# Patient Record
Sex: FEMALE | Race: WHITE | Employment: FULL TIME | URBAN - NONMETROPOLITAN AREA
[De-identification: names, ages, dates, MRNs, and addresses within clinical notes are randomized per-mention and may not be internally consistent; named-entity substitution may affect disease eponyms.]

---

## 2020-11-11 ENCOUNTER — APPOINTMENT (OUTPATIENT)
Dept: GENERAL RADIOLOGY | Age: 30
End: 2020-11-11
Payer: MEDICAID

## 2020-11-11 ENCOUNTER — HOSPITAL ENCOUNTER (EMERGENCY)
Age: 30
Discharge: HOME | End: 2020-11-11
Payer: MEDICAID

## 2020-11-11 VITALS
SYSTOLIC BLOOD PRESSURE: 122 MMHG | HEART RATE: 62 BPM | RESPIRATION RATE: 20 BRPM | TEMPERATURE: 97 F | OXYGEN SATURATION: 99 % | DIASTOLIC BLOOD PRESSURE: 85 MMHG

## 2020-11-11 LAB
-: ABNORMAL
ALBUMIN SERPL-MCNC: 4.4 G/DL (ref 3.5–5.2)
ALBUMIN/GLOBULIN RATIO: 1.4 (ref 1–2.5)
ALP BLD-CCNC: 122 U/L (ref 35–104)
ALT SERPL-CCNC: 19 U/L (ref 5–33)
AMORPHOUS: ABNORMAL
ANION GAP SERPL CALCULATED.3IONS-SCNC: 13 MMOL/L (ref 9–17)
AST SERPL-CCNC: 19 U/L
BACTERIA: ABNORMAL
BILIRUB SERPL-MCNC: 0.27 MG/DL (ref 0.3–1.2)
BILIRUBIN URINE: NEGATIVE
BUN BLDV-MCNC: 11 MG/DL (ref 6–20)
BUN/CREAT BLD: 14 (ref 9–20)
CALCIUM SERPL-MCNC: 9.4 MG/DL (ref 8.6–10.4)
CASTS UA: ABNORMAL /LPF
CHLORIDE BLD-SCNC: 105 MMOL/L (ref 98–107)
CO2: 24 MMOL/L (ref 20–31)
COLOR: YELLOW
COMMENT UA: ABNORMAL
CREAT SERPL-MCNC: 0.81 MG/DL (ref 0.5–0.9)
CRYSTALS, UA: ABNORMAL /HPF
D-DIMER QUANTITATIVE: <0.27 MG/L FEU (ref 0–0.59)
EPITHELIAL CELLS UA: ABNORMAL /HPF (ref 0–25)
GFR AFRICAN AMERICAN: >60 ML/MIN
GFR NON-AFRICAN AMERICAN: >60 ML/MIN
GFR SERPL CREATININE-BSD FRML MDRD: ABNORMAL ML/MIN/{1.73_M2}
GFR SERPL CREATININE-BSD FRML MDRD: ABNORMAL ML/MIN/{1.73_M2}
GLUCOSE BLD-MCNC: 89 MG/DL (ref 70–99)
GLUCOSE URINE: NEGATIVE
HCG(URINE) PREGNANCY TEST: NEGATIVE
HCT VFR BLD CALC: 38.3 % (ref 36.3–47.1)
HEMOGLOBIN: 12.9 G/DL (ref 11.9–15.1)
KETONES, URINE: NEGATIVE
LEUKOCYTE ESTERASE, URINE: NEGATIVE
LIPASE: 19 U/L (ref 13–60)
MCH RBC QN AUTO: 30.5 PG (ref 25.2–33.5)
MCHC RBC AUTO-ENTMCNC: 33.7 G/DL (ref 28.4–34.8)
MCV RBC AUTO: 90.5 FL (ref 82.6–102.9)
MUCUS: ABNORMAL
NITRITE, URINE: NEGATIVE
NRBC AUTOMATED: 0 PER 100 WBC
OTHER OBSERVATIONS UA: ABNORMAL
PDW BLD-RTO: 11.7 % (ref 11.8–14.4)
PH UA: 6 (ref 5–9)
PLATELET # BLD: 270 K/UL (ref 138–453)
PMV BLD AUTO: 9.4 FL (ref 8.1–13.5)
POTASSIUM SERPL-SCNC: 4 MMOL/L (ref 3.7–5.3)
PROTEIN UA: NEGATIVE
RBC # BLD: 4.23 M/UL (ref 3.95–5.11)
RBC UA: ABNORMAL /HPF (ref 0–2)
RENAL EPITHELIAL, UA: ABNORMAL /HPF
SARS-COV-2, RAPID: NOT DETECTED
SARS-COV-2: NORMAL
SARS-COV-2: NORMAL
SODIUM BLD-SCNC: 142 MMOL/L (ref 135–144)
SOURCE: NORMAL
SPECIFIC GRAVITY UA: 1.02 (ref 1.01–1.02)
TOTAL PROTEIN: 7.6 G/DL (ref 6.4–8.3)
TRICHOMONAS: ABNORMAL
TROPONIN INTERP: NORMAL
TROPONIN T: NORMAL NG/ML
TROPONIN, HIGH SENSITIVITY: <6 NG/L (ref 0–14)
TURBIDITY: CLEAR
URINE HGB: ABNORMAL
UROBILINOGEN, URINE: NORMAL
WBC # BLD: 8.5 K/UL (ref 3.5–11.3)
WBC UA: ABNORMAL /HPF (ref 0–5)
YEAST: ABNORMAL

## 2020-11-11 PROCEDURE — 6370000000 HC RX 637 (ALT 250 FOR IP): Performed by: PHYSICIAN ASSISTANT

## 2020-11-11 PROCEDURE — 96361 HYDRATE IV INFUSION ADD-ON: CPT

## 2020-11-11 PROCEDURE — 93005 ELECTROCARDIOGRAM TRACING: CPT | Performed by: PHYSICIAN ASSISTANT

## 2020-11-11 PROCEDURE — C9803 HOPD COVID-19 SPEC COLLECT: HCPCS

## 2020-11-11 PROCEDURE — U0002 COVID-19 LAB TEST NON-CDC: HCPCS

## 2020-11-11 PROCEDURE — 36415 COLL VENOUS BLD VENIPUNCTURE: CPT

## 2020-11-11 PROCEDURE — 71045 X-RAY EXAM CHEST 1 VIEW: CPT

## 2020-11-11 PROCEDURE — 81025 URINE PREGNANCY TEST: CPT

## 2020-11-11 PROCEDURE — 84484 ASSAY OF TROPONIN QUANT: CPT

## 2020-11-11 PROCEDURE — 6360000002 HC RX W HCPCS: Performed by: PHYSICIAN ASSISTANT

## 2020-11-11 PROCEDURE — 96374 THER/PROPH/DIAG INJ IV PUSH: CPT

## 2020-11-11 PROCEDURE — 83690 ASSAY OF LIPASE: CPT

## 2020-11-11 PROCEDURE — 85027 COMPLETE CBC AUTOMATED: CPT

## 2020-11-11 PROCEDURE — 99284 EMERGENCY DEPT VISIT MOD MDM: CPT

## 2020-11-11 PROCEDURE — 85379 FIBRIN DEGRADATION QUANT: CPT

## 2020-11-11 PROCEDURE — 2580000003 HC RX 258: Performed by: PHYSICIAN ASSISTANT

## 2020-11-11 PROCEDURE — 81001 URINALYSIS AUTO W/SCOPE: CPT

## 2020-11-11 PROCEDURE — 80053 COMPREHEN METABOLIC PANEL: CPT

## 2020-11-11 RX ORDER — ALBUTEROL SULFATE 90 UG/1
1-2 AEROSOL, METERED RESPIRATORY (INHALATION) EVERY 4 HOURS PRN
Qty: 1 INHALER | Refills: 0 | Status: SHIPPED | OUTPATIENT
Start: 2020-11-11

## 2020-11-11 RX ORDER — ONDANSETRON 2 MG/ML
4 INJECTION INTRAMUSCULAR; INTRAVENOUS ONCE
Status: COMPLETED | OUTPATIENT
Start: 2020-11-11 | End: 2020-11-11

## 2020-11-11 RX ORDER — 0.9 % SODIUM CHLORIDE 0.9 %
1000 INTRAVENOUS SOLUTION INTRAVENOUS ONCE
Status: COMPLETED | OUTPATIENT
Start: 2020-11-11 | End: 2020-11-11

## 2020-11-11 RX ORDER — 0.9 % SODIUM CHLORIDE 0.9 %
1000 INTRAVENOUS SOLUTION INTRAVENOUS ONCE
Status: DISCONTINUED | OUTPATIENT
Start: 2020-11-11 | End: 2020-11-11

## 2020-11-11 RX ORDER — FLUOXETINE HYDROCHLORIDE 20 MG/1
60 CAPSULE ORAL DAILY
COMMUNITY
End: 2021-03-13 | Stop reason: ALTCHOICE

## 2020-11-11 RX ORDER — GUAIFENESIN 100 MG/5ML
100 SOLUTION ORAL ONCE
Status: COMPLETED | OUTPATIENT
Start: 2020-11-11 | End: 2020-11-11

## 2020-11-11 RX ORDER — ACETAMINOPHEN 500 MG
1000 TABLET ORAL ONCE
Status: COMPLETED | OUTPATIENT
Start: 2020-11-11 | End: 2020-11-11

## 2020-11-11 RX ORDER — ONDANSETRON 4 MG/1
4 TABLET, ORALLY DISINTEGRATING ORAL EVERY 8 HOURS PRN
Qty: 20 TABLET | Refills: 0 | Status: SHIPPED | OUTPATIENT
Start: 2020-11-11 | End: 2021-03-13 | Stop reason: ALTCHOICE

## 2020-11-11 RX ORDER — GUAIFENESIN 600 MG/1
1200 TABLET, EXTENDED RELEASE ORAL 2 TIMES DAILY
COMMUNITY
End: 2021-03-13 | Stop reason: ALTCHOICE

## 2020-11-11 RX ADMIN — SODIUM CHLORIDE 1000 ML: 9 INJECTION, SOLUTION INTRAVENOUS at 19:25

## 2020-11-11 RX ADMIN — ONDANSETRON 4 MG: 2 INJECTION INTRAMUSCULAR; INTRAVENOUS at 19:24

## 2020-11-11 RX ADMIN — GUAIFENESIN 100 MG: 300 SOLUTION ORAL at 21:24

## 2020-11-11 RX ADMIN — ACETAMINOPHEN 1000 MG: 500 TABLET, FILM COATED ORAL at 21:23

## 2020-11-11 ASSESSMENT — PAIN SCALES - GENERAL
PAINLEVEL_OUTOF10: 5
PAINLEVEL_OUTOF10: 7
PAINLEVEL_OUTOF10: 8

## 2020-11-11 ASSESSMENT — ENCOUNTER SYMPTOMS
BLOOD IN STOOL: 0
BACK PAIN: 0
SHORTNESS OF BREATH: 0
WHEEZING: 0
RHINORRHEA: 0
CHEST TIGHTNESS: 0
COUGH: 0
ABDOMINAL PAIN: 0
SORE THROAT: 0
CONSTIPATION: 0
EYE REDNESS: 0
EYE DISCHARGE: 0
VOMITING: 1
DIARRHEA: 1
NAUSEA: 1

## 2020-11-11 ASSESSMENT — PAIN DESCRIPTION - PAIN TYPE
TYPE: ACUTE PAIN
TYPE: ACUTE PAIN

## 2020-11-11 ASSESSMENT — PAIN DESCRIPTION - LOCATION
LOCATION: GENERALIZED
LOCATION: THROAT

## 2020-11-11 ASSESSMENT — PAIN - FUNCTIONAL ASSESSMENT: PAIN_FUNCTIONAL_ASSESSMENT: 0-10

## 2020-11-12 LAB
EKG ATRIAL RATE: 63 BPM
EKG P AXIS: 56 DEGREES
EKG P-R INTERVAL: 172 MS
EKG Q-T INTERVAL: 418 MS
EKG QRS DURATION: 90 MS
EKG QTC CALCULATION (BAZETT): 427 MS
EKG R AXIS: 46 DEGREES
EKG T AXIS: 50 DEGREES
EKG VENTRICULAR RATE: 63 BPM

## 2020-11-12 PROCEDURE — 93010 ELECTROCARDIOGRAM REPORT: CPT | Performed by: INTERNAL MEDICINE

## 2020-11-12 NOTE — ED PROVIDER NOTES
[facility] ED  eMERGENCY dEPARTMENT eNCOUnter      Pt Name: Buck Hauser  MRN: [id number]  [place] 1990  Date of evaluation: 11/11/2020  Provider: Marissa Cruz Dr     Chief Complaint   Patient presents with    Fatigue     onset this last weekend, getting worse today    Emesis     \"cannot keep anything down\" x2 days    Diarrhea     x2 days    Headache     onset sunday         HISTORY OF PRESENT ILLNESS   (Location/Symptom, Timing/Onset, Context/Setting,Quality, Duration, Modifying Factors, Severity)  Note limiting factors. Buck Hauser is a29 y.o. female who presents to the emergency department with complaints of generalized body aches feeling like her entire body is on pain for the last 2 days. Associated complaints clued nausea vomiting diarrhea. Patient persistently cannot keep a lot of stuff down. She denies abdominal pain. She denies any neck pain or back pain. She denies any chest pain shortness of breath or dizziness. She otherwise has no other complaints at this time. HPI    Nursing Notes werereviewed. REVIEW OF SYSTEMS    (2-9 systems for level 4, 10 or more for level 5)     Review of Systems   Constitutional: Negative for chills, diaphoresis and fever. HENT: Negative for congestion, ear pain, rhinorrhea and sore throat. Eyes: Negative for discharge, redness and visual disturbance. Respiratory: Negative for cough, chest tightness, shortness of breath and wheezing. Cardiovascular: Negative for chest pain and palpitations. Gastrointestinal: Positive for diarrhea, nausea and vomiting. Negative for abdominal pain, blood in stool and constipation. Endocrine: Negative for polydipsia, polyphagia and polyuria. Genitourinary: Negative for decreased urine volume, difficulty urinating, dysuria, frequency and hematuria. Musculoskeletal: Positive for arthralgias. Negative for back pain and myalgias. Skin: Negative for pallor and rash. Allergic/Immunologic: Negative for food allergies and immunocompromised state. Neurological: Negative for dizziness, syncope, weakness and light-headedness. Hematological: Negative for adenopathy. Does not bruise/bleed easily. Psychiatric/Behavioral: Negative for behavioral problems and suicidal ideas. The patient is not nervous/anxious. Except as noted above the remainder of the review of systems was reviewed and negative. PAST MEDICAL HISTORY     Past Medical History:   Diagnosis Date    Anxiety     Depression     Drug abuse ([facility] [address]          SURGICALHISTORY       Past Surgical History:   Procedure Laterality Date    APPENDECTOMY      HYSTERECTOMY           CURRENT MEDICATIONS       Discharge Medication List as of 11/11/2020 10:02 PM      CONTINUE these medications which have NOT CHANGED    Details   FLUoxetine (PROZAC) 20 MG capsule Take 60 mg by mouth dailyHistorical Med      guaiFENesin (MUCINEX) 600 MG extended release tablet Take 1,200 mg by mouth 2 times dailyHistorical Med             ALLERGIES     Carafate [sucralfate]; Keflex [cephalexin]; Morphine; and Mushroom extract complex    FAMILY HISTORY     History reviewed. No pertinent family history.        SOCIAL HISTORY       Social History     Socioeconomic History    Marital status: Single     Spouse name: None    Number of children: None    Years of education: None    Highest education level: None   Occupational History    None   Social Needs    Financial resource strain: None    Food insecurity     Worry: None     Inability: None    Transportation needs     Medical: None     Non-medical: None   Tobacco Use    Smoking status: Former Smoker    Smokeless tobacco: Never Used   Substance and Sexual Activity    Alcohol use: Not Currently    Drug use: Not Currently    Sexual activity: Yes   Lifestyle    Physical activity     Days per week: None     Minutes per session: None    Stress: None   Relationships    Social connections     Talks on phone: None     Gets together: None     Attends Anabaptist service: None     Active member of club or organization: None     Attends meetings of clubs or organizations: None     Relationship status: None    Intimate partner violence     Fear of current or ex partner: None     Emotionally abused: None     Physically abused: None     Forced sexual activity: None   Other Topics Concern    None   Social History Narrative    None       SCREENINGS      @FLOW([id number])@      PHYSICAL EXAM    (up to 7 for level 4, 8 or more for level 5)     ED Triage Vitals   BP Temp Temp src Pulse Resp SpO2 Height Weight   11/11/20 1806 11/11/20 1816 -- 11/11/20 1806 11/11/20 1806 11/11/20 1806 -- --   133/87 97 °F (36.1 °C)  68 16 99 %         Physical Exam  Vitals signs and nursing note reviewed. Constitutional:       General: She is not in acute distress. Appearance: Normal appearance. She is well-developed. She is not ill-appearing, toxic-appearing or diaphoretic. HENT:      Head: Normocephalic and atraumatic. Right Ear: External ear normal.      Left Ear: External ear normal.      Mouth/Throat:      Pharynx: No oropharyngeal exudate. Eyes:      General: No scleral icterus. Right eye: No discharge. Left eye: No discharge. Conjunctiva/sclera: Conjunctivae normal.      Pupils: Pupils are equal, round, and reactive to light. Neck:      Musculoskeletal: Normal range of motion and neck supple. Thyroid: No thyromegaly. Trachea: No tracheal deviation. Cardiovascular:      Rate and Rhythm: Normal rate and regular rhythm. Heart sounds: No murmur. No friction rub. No gallop. Pulmonary:      Effort: Pulmonary effort is normal. No respiratory distress. Breath sounds: Normal breath sounds. No stridor. No wheezing or rhonchi. Abdominal:      General: Bowel sounds are normal. There is no distension. Palpations: Abdomen is soft. Tenderness:  There is no abdominal tenderness. There is no right CVA tenderness, left CVA tenderness, guarding or rebound. Hernia: No hernia is present. Musculoskeletal: Normal range of motion. General: No tenderness or deformity. Lymphadenopathy:      Cervical: No cervical adenopathy. Skin:     General: Skin is warm and dry. Capillary Refill: Capillary refill takes less than 2 seconds. Findings: No erythema or rash. Neurological:      General: No focal deficit present. Mental Status: She is alert and oriented to person, place, and time. Cranial Nerves: No cranial nerve deficit. Motor: No abnormal muscle tone. Deep Tendon Reflexes: Reflexes are normal and symmetric.  Reflexes normal.   Psychiatric:         Mood and Affect: Mood normal.         Behavior: Behavior normal.         DIAGNOSTIC RESULTS     EKG: All EKG's are interpreted by the Emergency Department Physician who either signs orCo-signs this chart in the absence of a cardiologist.      RADIOLOGY:   Non-plainfilm images such as CT, Ultrasound and MRI are read by the radiologist. Plain radiographic images are visualized and preliminarily interpreted by the emergency physician with the below findings:      Interpretationper the Radiologist below, if available at the time of this note:    XR CHEST PORTABLE   Final Result   No acute cardiopulmonary process               ED BEDSIDE ULTRASOUND:   Performed by ED Physician - none    LABS:  Labs Reviewed   CBC - Abnormal; Notable for the following components:       Result Value    RDW 11.7 (*)     All other components within normal limits   COMPREHENSIVE METABOLIC PANEL - Abnormal; Notable for the following components:    Alkaline Phosphatase 122 (*)     Total Bilirubin 0.27 (*)     All other components within normal limits   URINE RT REFLEX TO CULTURE - Abnormal; Notable for the following components:    Specific Gravity, UA 1.025 (*)     Urine Hgb TRACE (*)     All other components within normal limits   MICROSCOPIC URINALYSIS - Abnormal; Notable for the following components:    Bacteria, UA TRACE (*)     Mucus, UA 1+ (*)     All other components within normal limits   LIPASE   PREGNANCY, URINE   TROPONIN   D-DIMER, QUANTITATIVE   COVID-19       All other labs were within normal range or not returned as of this dictation. EMERGENCY DEPARTMENT COURSE and DIFFERENTIAL DIAGNOSIS/MDM:   Vitals:    Vitals:    11/11/20 2145 11/11/20 2200 11/11/20 2215 11/11/20 2225   BP: (!) 115/91 128/88 122/85    Pulse: 56 62  62   Resp: 15 20     Temp:       SpO2: 98% 98% 99%          MDM  80-year-old female who presents with generalized body aches nausea vomiting diarrhea. On exam she is not meningismus nonencephalopathic has no abdominal tenderness. She has been exposed to others in the community. I am concerned she could possibly have COVID-19. Were going to get work-up to rule out acute infection dehydration likely imbalance I think this is less likely colitis obstruction mass appendicitis cholecystitis. Patient while in her room started to complain of shortness of breath. Will get work-up. Rule out PE, MI, ACS, pneumonia    At this point, the evidence for any other entities in the differential is insufficient to justify any further testing. This was extended the patient. The patient was advised that persistent or worsening symptoms would require further evaluation. My attending physician will follow up remaining work-up. Procedures    FINAL IMPRESSION      1.  Viral syndrome        DISPOSITION/PLAN   DISPOSITION Decision To Discharge 11/11/2020 09:54:46 PM      PATIENT REFERRED TO:  [facility] ED  [address] [phone]    If symptoms worsen, As needed      DISCHARGE MEDICATIONS:  Discharge Medication List as of 11/11/2020 10:02 PM      START taking these medications    Details   ondansetron (ZOFRAN ODT) 4 MG disintegrating tablet Take 1 tablet by mouth every 8 hours as needed for Nausea, Disp-20 tablet,R-0Print      albuterol sulfate HFA (PROVENTIL HFA) 108 (90 Base) MCG/ACT inhaler Inhale 1-2 puffs into the lungs every 4 hours as needed for Wheezing or Shortness of Breath (Space out to every 6 hours as symptoms improve) Space out to every 6 hours as symptoms improve., Disp-1 Inhaler,R-0Print      bismuth subsalicylate (BISMATROL) 262 MG/15ML suspension Take 15 mLs by mouth every 6 hours as needed for Diarrhea, Disp-118 mL,R-0Print                    Summation      Patient Course:      ED Medications administered this visit:    Medications   ondansetron (ZOFRAN) injection 4 mg (4 mg Intravenous Given 11/11/20 1924)   0.9 % sodium chloride bolus (0 mLs Intravenous Stopped 11/11/20 2030)   acetaminophen (TYLENOL) tablet 1,000 mg (1,000 mg Oral Given 11/11/20 2123)   guaiFENesin (ROBITUSSIN) 100 MG/5ML oral solution 100 mg (100 mg Oral Given 11/11/20 2124)       New Prescriptions from this visit:    Discharge Medication List as of 11/11/2020 10:02 PM      START taking these medications    Details   ondansetron (ZOFRAN ODT) 4 MG disintegrating tablet Take 1 tablet by mouth every 8 hours as needed for Nausea, Disp-20 tablet,R-0Print      albuterol sulfate HFA (PROVENTIL HFA) 108 (90 Base) MCG/ACT inhaler Inhale 1-2 puffs into the lungs every 4 hours as needed for Wheezing or Shortness of Breath (Space out to every 6 hours as symptoms improve) Space out to every 6 hours as symptoms improve., Disp-1 Inhaler,R-0Print      bismuth subsalicylate (BISMATROL) 262 MG/15ML suspension Take 15 mLs by mouth every 6 hours as needed for Diarrhea, Disp-118 mL,R-0Print             Follow-up:  [facility] ED  [address]  [phone]    If symptoms worsen, As needed        Final Impression:   1.  Viral syndrome               (Please note that portions of this note were completed with a voice recognition program.  Efforts were made to edit the dictations but occasionally words are mis-transcribed.)           Cathy Neely PA-C  11/12/20 2103

## 2021-03-13 ENCOUNTER — HOSPITAL ENCOUNTER (EMERGENCY)
Age: 31
Discharge: HOME | End: 2021-03-13
Attending: EMERGENCY MEDICINE
Payer: MEDICAID

## 2021-03-13 ENCOUNTER — APPOINTMENT (OUTPATIENT)
Dept: GENERAL RADIOLOGY | Age: 31
End: 2021-03-13
Payer: MEDICAID

## 2021-03-13 VITALS
HEART RATE: 74 BPM | TEMPERATURE: 97.5 F | WEIGHT: 150 LBS | HEIGHT: 66 IN | RESPIRATION RATE: 15 BRPM | SYSTOLIC BLOOD PRESSURE: 106 MMHG | DIASTOLIC BLOOD PRESSURE: 70 MMHG | OXYGEN SATURATION: 99 % | BODY MASS INDEX: 24.11 KG/M2

## 2021-03-13 DIAGNOSIS — G43.009 MIGRAINE WITHOUT AURA AND WITHOUT STATUS MIGRAINOSUS, NOT INTRACTABLE: Primary | ICD-10-CM

## 2021-03-13 DIAGNOSIS — B34.9 VIRAL SYNDROME: ICD-10-CM

## 2021-03-13 LAB
ABSOLUTE EOS #: 0.06 K/UL (ref 0–0.44)
ABSOLUTE IMMATURE GRANULOCYTE: <0.03 K/UL (ref 0–0.3)
ABSOLUTE LYMPH #: 0.89 K/UL (ref 1.1–3.7)
ABSOLUTE MONO #: 0.96 K/UL (ref 0.1–1.2)
ANION GAP SERPL CALCULATED.3IONS-SCNC: 11 MMOL/L (ref 9–17)
BASOPHILS # BLD: 0 % (ref 0–2)
BASOPHILS ABSOLUTE: <0.03 K/UL (ref 0–0.2)
BUN BLDV-MCNC: 16 MG/DL (ref 6–20)
BUN/CREAT BLD: 20 (ref 9–20)
CALCIUM SERPL-MCNC: 9.7 MG/DL (ref 8.6–10.4)
CHLORIDE BLD-SCNC: 97 MMOL/L (ref 98–107)
CO2: 26 MMOL/L (ref 20–31)
CREAT SERPL-MCNC: 0.79 MG/DL (ref 0.5–0.9)
DIFFERENTIAL TYPE: ABNORMAL
DIRECT EXAM: NORMAL
EOSINOPHILS RELATIVE PERCENT: 1 % (ref 1–4)
GFR AFRICAN AMERICAN: >60 ML/MIN
GFR NON-AFRICAN AMERICAN: >60 ML/MIN
GFR SERPL CREATININE-BSD FRML MDRD: ABNORMAL ML/MIN/{1.73_M2}
GFR SERPL CREATININE-BSD FRML MDRD: ABNORMAL ML/MIN/{1.73_M2}
GLUCOSE BLD-MCNC: 103 MG/DL (ref 70–99)
HCT VFR BLD CALC: 39.8 % (ref 36.3–47.1)
HEMOGLOBIN: 13.4 G/DL (ref 11.9–15.1)
IMMATURE GRANULOCYTES: 0 %
LYMPHOCYTES # BLD: 13 % (ref 24–43)
Lab: NORMAL
MCH RBC QN AUTO: 30.4 PG (ref 25.2–33.5)
MCHC RBC AUTO-ENTMCNC: 33.7 G/DL (ref 28.4–34.8)
MCV RBC AUTO: 90.2 FL (ref 82.6–102.9)
MONOCYTES # BLD: 14 % (ref 3–12)
NRBC AUTOMATED: 0 PER 100 WBC
PDW BLD-RTO: 11.5 % (ref 11.8–14.4)
PLATELET # BLD: 222 K/UL (ref 138–453)
PLATELET ESTIMATE: ABNORMAL
PMV BLD AUTO: 9.7 FL (ref 8.1–13.5)
POTASSIUM SERPL-SCNC: 3.6 MMOL/L (ref 3.7–5.3)
RBC # BLD: 4.41 M/UL (ref 3.95–5.11)
RBC # BLD: ABNORMAL 10*6/UL
SARS-COV-2, RAPID: NOT DETECTED
SARS-COV-2: NORMAL
SARS-COV-2: NOT DETECTED
SEG NEUTROPHILS: 72 % (ref 36–65)
SEGMENTED NEUTROPHILS ABSOLUTE COUNT: 4.74 K/UL (ref 1.5–8.1)
SODIUM BLD-SCNC: 134 MMOL/L (ref 135–144)
SOURCE: NORMAL
SPECIMEN DESCRIPTION: NORMAL
SPECIMEN DESCRIPTION: NORMAL
WBC # BLD: 6.7 K/UL (ref 3.5–11.3)
WBC # BLD: ABNORMAL 10*3/UL

## 2021-03-13 PROCEDURE — 6370000000 HC RX 637 (ALT 250 FOR IP): Performed by: EMERGENCY MEDICINE

## 2021-03-13 PROCEDURE — 96374 THER/PROPH/DIAG INJ IV PUSH: CPT

## 2021-03-13 PROCEDURE — C9803 HOPD COVID-19 SPEC COLLECT: HCPCS

## 2021-03-13 PROCEDURE — 80048 BASIC METABOLIC PNL TOTAL CA: CPT

## 2021-03-13 PROCEDURE — 6360000002 HC RX W HCPCS: Performed by: EMERGENCY MEDICINE

## 2021-03-13 PROCEDURE — U0005 INFEC AGEN DETEC AMPLI PROBE: HCPCS

## 2021-03-13 PROCEDURE — U0003 INFECTIOUS AGENT DETECTION BY NUCLEIC ACID (DNA OR RNA); SEVERE ACUTE RESPIRATORY SYNDROME CORONAVIRUS 2 (SARS-COV-2) (CORONAVIRUS DISEASE [COVID-19]), AMPLIFIED PROBE TECHNIQUE, MAKING USE OF HIGH THROUGHPUT TECHNOLOGIES AS DESCRIBED BY CMS-2020-01-R: HCPCS

## 2021-03-13 PROCEDURE — 96375 TX/PRO/DX INJ NEW DRUG ADDON: CPT

## 2021-03-13 PROCEDURE — 2580000003 HC RX 258: Performed by: EMERGENCY MEDICINE

## 2021-03-13 PROCEDURE — 87880 STREP A ASSAY W/OPTIC: CPT

## 2021-03-13 PROCEDURE — 99285 EMERGENCY DEPT VISIT HI MDM: CPT

## 2021-03-13 PROCEDURE — U0002 COVID-19 LAB TEST NON-CDC: HCPCS

## 2021-03-13 PROCEDURE — 71045 X-RAY EXAM CHEST 1 VIEW: CPT

## 2021-03-13 PROCEDURE — 36415 COLL VENOUS BLD VENIPUNCTURE: CPT

## 2021-03-13 PROCEDURE — 85025 COMPLETE CBC W/AUTO DIFF WBC: CPT

## 2021-03-13 RX ORDER — DIPHENHYDRAMINE HYDROCHLORIDE 50 MG/ML
25 INJECTION INTRAMUSCULAR; INTRAVENOUS ONCE
Status: COMPLETED | OUTPATIENT
Start: 2021-03-13 | End: 2021-03-13

## 2021-03-13 RX ORDER — ACETAMINOPHEN 325 MG/1
650 TABLET ORAL ONCE
Status: COMPLETED | OUTPATIENT
Start: 2021-03-13 | End: 2021-03-13

## 2021-03-13 RX ORDER — 0.9 % SODIUM CHLORIDE 0.9 %
1000 INTRAVENOUS SOLUTION INTRAVENOUS ONCE
Status: COMPLETED | OUTPATIENT
Start: 2021-03-13 | End: 2021-03-13

## 2021-03-13 RX ORDER — BENZONATATE 100 MG/1
100 CAPSULE ORAL 3 TIMES DAILY PRN
Status: DISCONTINUED | OUTPATIENT
Start: 2021-03-13 | End: 2021-03-13 | Stop reason: HOSPADM

## 2021-03-13 RX ORDER — PROCHLORPERAZINE EDISYLATE 5 MG/ML
10 INJECTION INTRAMUSCULAR; INTRAVENOUS ONCE
Status: COMPLETED | OUTPATIENT
Start: 2021-03-13 | End: 2021-03-13

## 2021-03-13 RX ADMIN — SODIUM CHLORIDE 1000 ML: 9 INJECTION, SOLUTION INTRAVENOUS at 08:00

## 2021-03-13 RX ADMIN — DIPHENHYDRAMINE HYDROCHLORIDE 25 MG: 50 INJECTION, SOLUTION INTRAMUSCULAR; INTRAVENOUS at 08:01

## 2021-03-13 RX ADMIN — BENZONATATE 100 MG: 100 CAPSULE ORAL at 09:01

## 2021-03-13 RX ADMIN — PROCHLORPERAZINE EDISYLATE 10 MG: 5 INJECTION INTRAMUSCULAR; INTRAVENOUS at 08:00

## 2021-03-13 RX ADMIN — ACETAMINOPHEN 650 MG: 325 TABLET ORAL at 08:00

## 2021-03-13 ASSESSMENT — ENCOUNTER SYMPTOMS
COUGH: 1
DIARRHEA: 1
ABDOMINAL PAIN: 0
EYE PAIN: 0
NAUSEA: 1
SHORTNESS OF BREATH: 1
EYE REDNESS: 0
PHOTOPHOBIA: 0
ALLERGIC/IMMUNOLOGIC NEGATIVE: 1
SORE THROAT: 1
VOMITING: 1

## 2021-03-13 ASSESSMENT — PAIN DESCRIPTION - PAIN TYPE: TYPE: ACUTE PAIN

## 2021-03-13 ASSESSMENT — PAIN SCALES - GENERAL
PAINLEVEL_OUTOF10: 0
PAINLEVEL_OUTOF10: 10

## 2021-03-13 NOTE — LETTER
[facility] ED  [address] [place] [address] [place] [address]  Phone: [phone]  Fax: [phone]             March 13, 2021    Patient: Jeri Sprain   YOB: 1990   Date of Visit: 3/13/2021       To Whom It May Concern:    Lam Mckeon was seen and treated in our emergency department on 3/13/2021. She may not return to work until her covid test results. Sincerely,   WOODY BRYAN, RN            Signature:__________________________________

## 2021-03-13 NOTE — ED PROVIDER NOTES
[facility] ED  EMERGENCY DEPARTMENT ENCOUNTER      Pt Name: Jonelle Marcus  MRN: [id number]  [place] 1990  Date of evaluation: 3/13/2021  Provider: Brian Grimm MD    CHIEF COMPLAINT       Chief Complaint   Patient presents with    Migraine     ongoing for a week         HISTORY OF PRESENT ILLNESS   (Location/Symptom, Timing/Onset, Context/Setting, Quality, Duration, Modifying Factors, Severity)  Note limiting factors. Jonelle Marcus is a 35 y.o. female who presents to the emergency department     81-year-old female presents emergency department with concerns of gradual onset headache approxi-1 week in duration. Most recent history for headache of similar onset and similar discomfort was approximately 3 years prior. Patient also relates that she has had a sore throat, cough nonproductive, generalized aching, undocumented fever, chills, nausea occasional vomiting, alteration of taste. Patient denies any specific chest pain. Denies any abdominal pain. Most recently had a rapid test (Covid) 2 days prior was reportedly negative          Nursing Notes were reviewed. REVIEW OF SYSTEMS    (2-9 systems for level 4, 10 or more for level 5)     Review of Systems   Constitutional: Positive for appetite change, chills, fatigue and fever. HENT: Positive for sore throat. Eyes: Negative for photophobia, pain and redness. Respiratory: Positive for cough and shortness of breath. Cardiovascular: Negative for chest pain, palpitations and leg swelling. Gastrointestinal: Positive for diarrhea, nausea and vomiting. Negative for abdominal pain. Endocrine: Negative. Genitourinary: Negative. Musculoskeletal: Negative. Allergic/Immunologic: Negative. Neurological: Negative. Hematological: Negative. Psychiatric/Behavioral: Negative. Except as noted above the remainder of the review of systems was reviewed and negative.        PAST MEDICAL HISTORY     Past Medical History: Concern    None   Social History Narrative    None       SCREENINGS                        PHYSICAL EXAM    (up to 7 for level 4, 8 or more for level 5)     ED Triage Vitals [03/13/21 0733]   BP Temp Temp src Pulse Resp SpO2 Height Weight   134/79 97.5 °F (36.4 °C) -- 85 16 98 % 5' 6\" (1.676 m) 150 lb (68 kg)       Physical Exam  Vitals signs and nursing note reviewed. Constitutional:       General: She is not in acute distress. Appearance: She is not toxic-appearing or diaphoretic. HENT:      Head: Normocephalic. Right Ear: Tympanic membrane normal.      Left Ear: Tympanic membrane normal.      Nose: Nose normal.      Mouth/Throat:      Mouth: Mucous membranes are dry. Pharynx: Posterior oropharyngeal erythema present. Comments: No drooling or stridor  Eyes:      Extraocular Movements: Extraocular movements intact. Pupils: Pupils are equal, round, and reactive to light. Comments: Subtle photophobia   Neck:      Musculoskeletal: Normal range of motion and neck supple. No neck rigidity or muscular tenderness. Vascular: No carotid bruit. Comments: No meningeal signs, negative Kernig's negative Brudzinski's neck is supple  Cardiovascular:      Rate and Rhythm: Normal rate and regular rhythm. Pulses: Normal pulses. Heart sounds: Normal heart sounds. Pulmonary:      Effort: Pulmonary effort is normal.      Comments: Scattered rhonchi without wheezing  Abdominal:      General: Abdomen is flat. Palpations: Abdomen is soft. Musculoskeletal: Normal range of motion. Right lower leg: No edema. Left lower leg: No edema. Lymphadenopathy:      Cervical: Cervical adenopathy (Minimal left anterior cervical lymph node tenderness with shotty adenopathy) present. Skin:     General: Skin is warm and dry. Capillary Refill: Capillary refill takes less than 2 seconds. Findings: No erythema or rash.    Neurological:      General: No focal deficit present. Cranial Nerves: No cranial nerve deficit. Sensory: No sensory deficit. Motor: No weakness. Coordination: Coordination normal.      Gait: Gait normal.         DIAGNOSTIC RESULTS     EKG: All EKG's are interpreted by the Emergency Department Physician who either signs or Co-signs this chart in the absence of a cardiologist.      RADIOLOGY:   Non-plain film images such as CT, Ultrasound and MRI are read by the radiologist. Plain radiographic images are visualized and preliminarily interpreted by the emergency physician with the below findings:      Interpretation per the Radiologist below, if available at the time of this note:    XR CHEST PORTABLE   Final Result   Minimal linear atelectasis/scarring. No focal consolidation is seen to   suggest pneumonia. ED BEDSIDE ULTRASOUND:   Performed by ED Physician - none    LABS:  Labs Reviewed   CBC WITH AUTO DIFFERENTIAL - Abnormal; Notable for the following components:       Result Value    RDW 11.5 (*)     Seg Neutrophils 72 (*)     Lymphocytes 13 (*)     Monocytes 14 (*)     Absolute Lymph # 0.89 (*)     All other components within normal limits   BASIC METABOLIC PANEL W/ REFLEX TO MG FOR LOW K - Abnormal; Notable for the following components:    Glucose 103 (*)     Sodium 134 (*)     Potassium 3.6 (*)     Chloride 97 (*)     All other components within normal limits   STREP SCREEN GROUP A THROAT   COVID-19, RAPID   COVID-19       All other labs were within normal range or not returned as of this dictation.     EMERGENCY DEPARTMENT COURSE and DIFFERENTIAL DIAGNOSIS/MDM:   Vitals:    Vitals:    03/13/21 0945 03/13/21 1000 03/13/21 1015 03/13/21 1016   BP: 104/61 102/65 106/70 106/70   Pulse: 63 64 64 74   Resp: 17 17 18 15   Temp:       SpO2: 98% 97% 98% 99%   Weight:       Height:             MDM  Number of Diagnoses or Management Options  Migraine without aura and without status migrainosus, not intractable  Viral syndrome  Diagnosis management comments: 71-year-old female presenting to the emergency department with constellation of concerns as documented in HPI. No red flags on examination or per laboratory testing. Patient received IV fluids IV Benadryl IV and IV Compazine in addition to Tylenol p.o. Working diagnosis of viral syndrome probably streptococcal pharyngitis entertained, COVID-19 infection, migraine headache. Patient felt much better after receiving a forementioned medication released in improved condition  Patient also aware that her Covid test which was a send out. The results of which she will be notified upon completion        REASSESSMENT   Patient remains hemodynamically stable nontoxic-appearing during emergency department course  ED Course as of Mar 13 1503   Sat Mar 13, 2021   0905 Chest x-ray no acute process radiologist read    [RS]   1502 CBC Auto Differential(!):    WBC 6.7   RBC 4.41   Hemoglobin Quant 13.4   Hematocrit 39.8   MCV 90.2   MCH 30.4   MCHC 33.7   RDW 11.5(!)   Platelet Count 768   MPV 9.7   NRBC Automated 0.0   Differential Type NOT REPORTED   Seg Neutrophils 72(!)   Lymphocytes 13(!)   Monocytes 14(!)   Eosinophils % 1   Basophils 0   Immature Granulocytes 0   Segs Absolute 4.74   Absolute Lymph # 0.89(!)   Absolute Mono # 0.96   Absolute Eos # 0.06   Basophils Absolute <0.03   Absolute Immature Granulocyte <0.03   ... [RS]   7619 Basic Metabolic Panel w/ Reflex to MG(!):    Glucose 103(!)   BUN 16   Creatinine 0.79   Bun/Cre Ratio 20   Calcium 9.7   Sodium 134(!)   Potassium 3.6(!)   Chloride 97(!)   CO2 26   Anion Gap 11   GFR Non- >60   GFR  >60   GFR Comment        GFR Staging      [RS]   1503 Strep Screen Group A Throat:    Specimen Description . THROAT   Special Requests NOT REPORTED   DIRECT EXAM. Rapid Strep A negative.  A negative Rapid Group A Strep Screen result does not rule out the possibility of Group A Streptococci in the specimen. A Group A Strep DNA test is available upon request. [RS]      ED Course User Index  [RS] Evelin Hendricks MD         CRITICAL CARE TIME   Total Critical Care time was minutes, excluding separately reportable procedures. There was a high probability of clinically significant/life threatening deterioration in the patient's condition which required my urgent intervention. CONSULTS:  None    PROCEDURES:  Unless otherwise noted below, none     Procedures    FINAL IMPRESSION      1. Migraine without aura and without status migrainosus, not intractable    2. Viral syndrome          DISPOSITION/PLAN   DISPOSITION Decision To Discharge 03/13/2021 10:14:01 AM      PATIENT REFERRED TO:  With your physician    Call in 2 days        DISCHARGE MEDICATIONS:  Discharge Medication List as of 3/13/2021 10:15 AM        Controlled Substances Monitoring:     No flowsheet data found.     (Please note that portions of this note were completed with a voice recognition program.  Efforts were made to edit the dictations but occasionally words are mis-transcribed.)    Evelin Hendricks MD (electronically signed)  Attending Emergency Physician            Evelin Hendricks MD  03/13/21 2439

## 2023-05-17 ENCOUNTER — OFFICE VISIT (OUTPATIENT)
Dept: PSYCHIATRY | Age: 33
End: 2023-05-17

## 2023-05-17 VITALS — WEIGHT: 153.8 LBS | BODY MASS INDEX: 24.72 KG/M2 | HEIGHT: 66 IN

## 2023-05-17 DIAGNOSIS — F43.12 CHRONIC POST-TRAUMATIC STRESS DISORDER (PTSD): ICD-10-CM

## 2023-05-17 DIAGNOSIS — F41.1 GAD (GENERALIZED ANXIETY DISORDER): ICD-10-CM

## 2023-05-17 DIAGNOSIS — F31.30 BIPOLAR I DISORDER, MOST RECENT EPISODE (OR CURRENT) DEPRESSED (HCC): Primary | ICD-10-CM

## 2023-05-17 DIAGNOSIS — G47.00 INSOMNIA, PERSISTENT: ICD-10-CM

## 2023-05-17 DIAGNOSIS — F60.3 BORDERLINE PERSONALITY DISORDER (HCC): ICD-10-CM

## 2023-05-17 PROBLEM — F31.0 BIPOLAR I DISORDER, MOST RECENT EPISODE HYPOMANIC (HCC): Status: ACTIVE | Noted: 2023-05-17

## 2023-05-17 RX ORDER — HYDROXYZINE HYDROCHLORIDE 25 MG/1
25 TABLET, FILM COATED ORAL 3 TIMES DAILY PRN
Qty: 90 TABLET | Refills: 1 | Status: SHIPPED | OUTPATIENT
Start: 2023-05-17

## 2023-05-17 RX ORDER — OXYBUTYNIN CHLORIDE 5 MG/1
TABLET ORAL
COMMUNITY
Start: 2023-04-18

## 2023-05-17 ASSESSMENT — PATIENT HEALTH QUESTIONNAIRE - PHQ9
6. FEELING BAD ABOUT YOURSELF - OR THAT YOU ARE A FAILURE OR HAVE LET YOURSELF OR YOUR FAMILY DOWN: 2
1. LITTLE INTEREST OR PLEASURE IN DOING THINGS: 3
3. TROUBLE FALLING OR STAYING ASLEEP: 3
SUM OF ALL RESPONSES TO PHQ QUESTIONS 1-9: 18
9. THOUGHTS THAT YOU WOULD BE BETTER OFF DEAD, OR OF HURTING YOURSELF: 0
7. TROUBLE CONCENTRATING ON THINGS, SUCH AS READING THE NEWSPAPER OR WATCHING TELEVISION: 1
SUM OF ALL RESPONSES TO PHQ QUESTIONS 1-9: 18
4. FEELING TIRED OR HAVING LITTLE ENERGY: 2
5. POOR APPETITE OR OVEREATING: 3
8. MOVING OR SPEAKING SO SLOWLY THAT OTHER PEOPLE COULD HAVE NOTICED. OR THE OPPOSITE, BEING SO FIGETY OR RESTLESS THAT YOU HAVE BEEN MOVING AROUND A LOT MORE THAN USUAL: 2
SUM OF ALL RESPONSES TO PHQ9 QUESTIONS 1 & 2: 5
2. FEELING DOWN, DEPRESSED OR HOPELESS: 2
10. IF YOU CHECKED OFF ANY PROBLEMS, HOW DIFFICULT HAVE THESE PROBLEMS MADE IT FOR YOU TO DO YOUR WORK, TAKE CARE OF THINGS AT HOME, OR GET ALONG WITH OTHER PEOPLE: 1

## 2023-05-17 ASSESSMENT — ANXIETY QUESTIONNAIRES
3. WORRYING TOO MUCH ABOUT DIFFERENT THINGS: 3
1. FEELING NERVOUS, ANXIOUS, OR ON EDGE: 3
6. BECOMING EASILY ANNOYED OR IRRITABLE: 3
7. FEELING AFRAID AS IF SOMETHING AWFUL MIGHT HAPPEN: 3
GAD7 TOTAL SCORE: 21
5. BEING SO RESTLESS THAT IT IS HARD TO SIT STILL: 3
IF YOU CHECKED OFF ANY PROBLEMS ON THIS QUESTIONNAIRE, HOW DIFFICULT HAVE THESE PROBLEMS MADE IT FOR YOU TO DO YOUR WORK, TAKE CARE OF THINGS AT HOME, OR GET ALONG WITH OTHER PEOPLE: SOMEWHAT DIFFICULT
2. NOT BEING ABLE TO STOP OR CONTROL WORRYING: 3
4. TROUBLE RELAXING: 3
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
## 2023-06-01 ENCOUNTER — TELEPHONE (OUTPATIENT)
Dept: PSYCHIATRY | Age: 33
End: 2023-06-01

## 2023-06-01 NOTE — TELEPHONE ENCOUNTER
Pierre Ferreira called into the office stating that she had seen this provider on the 17th of last month as a new patient and she was prescribed Hydroxyzine and Caplyta. She said that the Hydroxyzine is working \"wonderfully\". She said that she takes 1/2 tablet at night time to sleep and either a quarter or 1/4 of a tablet during the day and she is not nearly as anxiety in society. She said that she was unable to take the Zakia Arapiraca 1942 initially because all the pharmacies were out of stock; so she had taken her first dose on Saturday night and on Sunday morning, she was unable to do anything. She said that \"it was like my brain shut off and said no thank you. \" She said that she was unable to move, she was vomiting; she went to take a shower and could barely stand up. She said that she was very disoriented and slept all day. She said that she ended up missing work that day. She mentioned that she is currently staying with a friend and she had given her gatorade and crackers because she was so \"out of it. \" She said that she will not be taking this medication any longer and wanted to let this provider know prior to her 06/21/2023 appt. She said that she will continue on the Hydroxyzine medication in the meantime.

## 2023-06-20 NOTE — PROGRESS NOTES
[address] [place] [address]  Dept: [phone]  Dept Fax: [phone]  Loc: [phone]    Visit Date: 6/21/2023    SUBJECTIVE DATA       CHIEF COMPLAINT:    Chief Complaint   Patient presents with    Follow-up    Medication Check    Medication Refill    Mental Health Problem        History obtained from: patient    HISTORY OF PRESENT ILLNESS:    Odalis Harp is a 33 y.o. female who presents to the office for follow up with medication management. HPI  Patient presents calm and cooperative. Patient states feeling \"exhausted\" today. Patient stated she took Dyann Lukes once which caused her to feel \"disoriented\", therefore stopped taking it. She reports she took a shower yesterday, first time in the past 2 weeks. Patient stated she is having situational stressors with finances. She stated she is having a difficult time accepting help and donations from friends, and recently had her youngest daughter stay with her mother so she could work more to pay off bills. However patient tearful, stated she is working too much. She feels she works so she doesn't have to deal with the problems. Patient endorses the hydroxyzine is helping with insomnia and anxiety. Patient endorses social anxiety, doesn't like to be in the elevator, gets anxious when having to go to work being around people, difficult to go to grocery story and out with friends. She avoids taking her children places due to social anxiety. Patient endorses lacking self-esteem, lacking confidence. Strongly encouraged psychotherapy, gave resources. Discussed medication management with patient. Patient agreed with plan to start Brexpiprazole for mood instability and continue hydroxyzine for anxiety.      Patient rates depression 6/10 and anxiety 8/10 today on a scale of 0-10 with 10 being the worst.  Sleep- able to fall asleep, not able to stay asleep, not feeling

## 2023-06-21 ENCOUNTER — OFFICE VISIT (OUTPATIENT)
Dept: PSYCHIATRY | Age: 33
End: 2023-06-21
Payer: MEDICAID

## 2023-06-21 ENCOUNTER — HOSPITAL ENCOUNTER (OUTPATIENT)
Age: 33
Discharge: HOME | End: 2023-06-21
Payer: MEDICAID

## 2023-06-21 DIAGNOSIS — F31.30 BIPOLAR I DISORDER, MOST RECENT EPISODE (OR CURRENT) DEPRESSED (HCC): Primary | ICD-10-CM

## 2023-06-21 DIAGNOSIS — F40.10 SOCIAL ANXIETY DISORDER: ICD-10-CM

## 2023-06-21 DIAGNOSIS — G47.00 INSOMNIA, PERSISTENT: ICD-10-CM

## 2023-06-21 DIAGNOSIS — F41.1 GAD (GENERALIZED ANXIETY DISORDER): ICD-10-CM

## 2023-06-21 DIAGNOSIS — F60.3 BORDERLINE PERSONALITY DISORDER (HCC): ICD-10-CM

## 2023-06-21 DIAGNOSIS — F43.12 CHRONIC POST-TRAUMATIC STRESS DISORDER (PTSD): ICD-10-CM

## 2023-06-21 PROCEDURE — 90836 PSYTX W PT W E/M 45 MIN: CPT

## 2023-06-21 PROCEDURE — 99214 OFFICE O/P EST MOD 30 MIN: CPT

## 2023-06-21 PROCEDURE — G8420 CALC BMI NORM PARAMETERS: HCPCS

## 2023-06-21 PROCEDURE — 83516 IMMUNOASSAY NONANTIBODY: CPT

## 2023-06-21 PROCEDURE — G8427 DOCREV CUR MEDS BY ELIG CLIN: HCPCS

## 2023-06-21 PROCEDURE — 36415 COLL VENOUS BLD VENIPUNCTURE: CPT

## 2023-06-21 PROCEDURE — 1036F TOBACCO NON-USER: CPT

## 2023-06-21 RX ORDER — ONDANSETRON 4 MG/1
4 TABLET, ORALLY DISINTEGRATING ORAL
COMMUNITY
Start: 2023-03-31

## 2023-06-21 ASSESSMENT — PATIENT HEALTH QUESTIONNAIRE - PHQ9
4. FEELING TIRED OR HAVING LITTLE ENERGY: 3
7. TROUBLE CONCENTRATING ON THINGS, SUCH AS READING THE NEWSPAPER OR WATCHING TELEVISION: 2
SUM OF ALL RESPONSES TO PHQ9 QUESTIONS 1 & 2: 6
SUM OF ALL RESPONSES TO PHQ QUESTIONS 1-9: 22
3. TROUBLE FALLING OR STAYING ASLEEP: 3
5. POOR APPETITE OR OVEREATING: 3
2. FEELING DOWN, DEPRESSED OR HOPELESS: 3
SUM OF ALL RESPONSES TO PHQ QUESTIONS 1-9: 22
8. MOVING OR SPEAKING SO SLOWLY THAT OTHER PEOPLE COULD HAVE NOTICED. OR THE OPPOSITE, BEING SO FIGETY OR RESTLESS THAT YOU HAVE BEEN MOVING AROUND A LOT MORE THAN USUAL: 2
6. FEELING BAD ABOUT YOURSELF - OR THAT YOU ARE A FAILURE OR HAVE LET YOURSELF OR YOUR FAMILY DOWN: 3
SUM OF ALL RESPONSES TO PHQ QUESTIONS 1-9: 22
9. THOUGHTS THAT YOU WOULD BE BETTER OFF DEAD, OR OF HURTING YOURSELF: 0
SUM OF ALL RESPONSES TO PHQ QUESTIONS 1-9: 22
10. IF YOU CHECKED OFF ANY PROBLEMS, HOW DIFFICULT HAVE THESE PROBLEMS MADE IT FOR YOU TO DO YOUR WORK, TAKE CARE OF THINGS AT HOME, OR GET ALONG WITH OTHER PEOPLE: 1
1. LITTLE INTEREST OR PLEASURE IN DOING THINGS: 3

## 2023-06-21 ASSESSMENT — ANXIETY QUESTIONNAIRES
5. BEING SO RESTLESS THAT IT IS HARD TO SIT STILL: 3
2. NOT BEING ABLE TO STOP OR CONTROL WORRYING: 3
3. WORRYING TOO MUCH ABOUT DIFFERENT THINGS: 3
GAD7 TOTAL SCORE: 21
6. BECOMING EASILY ANNOYED OR IRRITABLE: 3
7. FEELING AFRAID AS IF SOMETHING AWFUL MIGHT HAPPEN: 3
1. FEELING NERVOUS, ANXIOUS, OR ON EDGE: 3
4. TROUBLE RELAXING: 3
IF YOU CHECKED OFF ANY PROBLEMS ON THIS QUESTIONNAIRE, HOW DIFFICULT HAVE THESE PROBLEMS MADE IT FOR YOU TO DO YOUR WORK, TAKE CARE OF THINGS AT HOME, OR GET ALONG WITH OTHER PEOPLE: SOMEWHAT DIFFICULT

## 2023-06-21 ASSESSMENT — COLUMBIA-SUICIDE SEVERITY RATING SCALE - C-SSRS
2. HAVE YOU ACTUALLY HAD ANY THOUGHTS OF KILLING YOURSELF?: NO
6. HAVE YOU EVER DONE ANYTHING, STARTED TO DO ANYTHING, OR PREPARED TO DO ANYTHING TO END YOUR LIFE?: NO
1. WITHIN THE PAST MONTH, HAVE YOU WISHED YOU WERE DEAD OR WISHED YOU COULD GO TO SLEEP AND NOT WAKE UP?: NO

## 2023-06-23 LAB — TTG IGA SER IA-ACNC: 0.7 U/ML

## 2023-07-17 ENCOUNTER — TELEMEDICINE (OUTPATIENT)
Dept: PSYCHIATRY | Age: 33
End: 2023-07-17
Payer: MEDICAID

## 2023-07-17 DIAGNOSIS — F41.1 GAD (GENERALIZED ANXIETY DISORDER): ICD-10-CM

## 2023-07-17 DIAGNOSIS — G47.00 INSOMNIA, PERSISTENT: ICD-10-CM

## 2023-07-17 DIAGNOSIS — F40.10 SOCIAL ANXIETY DISORDER: ICD-10-CM

## 2023-07-17 DIAGNOSIS — F31.30 BIPOLAR I DISORDER, MOST RECENT EPISODE (OR CURRENT) DEPRESSED (HCC): Primary | ICD-10-CM

## 2023-07-17 DIAGNOSIS — F43.12 CHRONIC POST-TRAUMATIC STRESS DISORDER (PTSD): ICD-10-CM

## 2023-07-17 DIAGNOSIS — F60.3 BORDERLINE PERSONALITY DISORDER (HCC): ICD-10-CM

## 2023-07-17 PROCEDURE — 90833 PSYTX W PT W E/M 30 MIN: CPT

## 2023-07-17 PROCEDURE — G8420 CALC BMI NORM PARAMETERS: HCPCS

## 2023-07-17 PROCEDURE — 99214 OFFICE O/P EST MOD 30 MIN: CPT

## 2023-07-17 PROCEDURE — G8427 DOCREV CUR MEDS BY ELIG CLIN: HCPCS

## 2023-07-17 PROCEDURE — 1036F TOBACCO NON-USER: CPT

## 2023-07-17 ASSESSMENT — PATIENT HEALTH QUESTIONNAIRE - PHQ9
6. FEELING BAD ABOUT YOURSELF - OR THAT YOU ARE A FAILURE OR HAVE LET YOURSELF OR YOUR FAMILY DOWN: 1
SUM OF ALL RESPONSES TO PHQ9 QUESTIONS 1 & 2: 1
8. MOVING OR SPEAKING SO SLOWLY THAT OTHER PEOPLE COULD HAVE NOTICED. OR THE OPPOSITE, BEING SO FIGETY OR RESTLESS THAT YOU HAVE BEEN MOVING AROUND A LOT MORE THAN USUAL: 2
SUM OF ALL RESPONSES TO PHQ QUESTIONS 1-9: 11
3. TROUBLE FALLING OR STAYING ASLEEP: 0
2. FEELING DOWN, DEPRESSED OR HOPELESS: 1
7. TROUBLE CONCENTRATING ON THINGS, SUCH AS READING THE NEWSPAPER OR WATCHING TELEVISION: 1
4. FEELING TIRED OR HAVING LITTLE ENERGY: 3
5. POOR APPETITE OR OVEREATING: 3
9. THOUGHTS THAT YOU WOULD BE BETTER OFF DEAD, OR OF HURTING YOURSELF: 0
1. LITTLE INTEREST OR PLEASURE IN DOING THINGS: 0
10. IF YOU CHECKED OFF ANY PROBLEMS, HOW DIFFICULT HAVE THESE PROBLEMS MADE IT FOR YOU TO DO YOUR WORK, TAKE CARE OF THINGS AT HOME, OR GET ALONG WITH OTHER PEOPLE: 0
SUM OF ALL RESPONSES TO PHQ QUESTIONS 1-9: 11

## 2023-07-17 ASSESSMENT — ANXIETY QUESTIONNAIRES
7. FEELING AFRAID AS IF SOMETHING AWFUL MIGHT HAPPEN: 3
3. WORRYING TOO MUCH ABOUT DIFFERENT THINGS: 3
IF YOU CHECKED OFF ANY PROBLEMS ON THIS QUESTIONNAIRE, HOW DIFFICULT HAVE THESE PROBLEMS MADE IT FOR YOU TO DO YOUR WORK, TAKE CARE OF THINGS AT HOME, OR GET ALONG WITH OTHER PEOPLE: SOMEWHAT DIFFICULT
6. BECOMING EASILY ANNOYED OR IRRITABLE: 3
GAD7 TOTAL SCORE: 21
5. BEING SO RESTLESS THAT IT IS HARD TO SIT STILL: 3
1. FEELING NERVOUS, ANXIOUS, OR ON EDGE: 3
4. TROUBLE RELAXING: 3
2. NOT BEING ABLE TO STOP OR CONTROL WORRYING: 3

## 2023-07-17 ASSESSMENT — COLUMBIA-SUICIDE SEVERITY RATING SCALE - C-SSRS
2. HAVE YOU ACTUALLY HAD ANY THOUGHTS OF KILLING YOURSELF?: NO
1. WITHIN THE PAST MONTH, HAVE YOU WISHED YOU WERE DEAD OR WISHED YOU COULD GO TO SLEEP AND NOT WAKE UP?: NO
6. HAVE YOU EVER DONE ANYTHING, STARTED TO DO ANYTHING, OR PREPARED TO DO ANYTHING TO END YOUR LIFE?: NO

## 2023-07-17 NOTE — PROGRESS NOTES
[address] [address]  Dept: [phone]  Dept Fax: [phone]  Loc: [phone]    Visit Date: 7/17/2023    SUBJECTIVE DATA       CHIEF COMPLAINT:    Chief Complaint   Patient presents with    Follow-up    Medication Check    Medication Refill    Mental Health Problem        History obtained from: patient    HISTORY OF PRESENT ILLNESS:    Shawn Adler is a 33 y.o. female who presents for follow up with medication management. HPI  Patient presents calm and cooperative. Patient states feeling \"okay\" today. Patient endorses medication compliance, stated medications are working, symptoms improving. Stated she is getting better sleep. Hydroxyzine has improved anxiety. Patient endorses situational stressors financially with daily anxiety. However she endorses able to manage anxiety better, able to leave house and not feel anxious around general public. Stated she is \"able to go to the store, shopping, not overwhelming any longer\". Patient tolerating medications, denies side effects. She endorses irritability, still lacking energy/motivation. Discussed medication management with patient. Patient agreed with plan to increase Brexpiprazole to improve mood symptoms further. Patient rates depression 5/10 and anxiety 2/10 today on a scale of 0-10 with 10 being the worst.  Sleep- able to fall asleep, able to stay asleep, not feeling rested. Endorses getting 6-7 hours continuous sleep. Endorses mind racing  Interest- improving  Energy and motivation - improving, \"able to keep my house clean\"  Concentration - poor  Memory -poor  Appetite- poor  Endorses irritability  Endorses restlessness  Endorses hopelessness, helplessness, and worthlessness  Denies suicidal or homicidal ideation, plan, or intent  What keeps you stable/reasons for not harming self? children  Patient contracts for safety with friends.  Gave

## 2023-08-08 NOTE — PROGRESS NOTES
[address] [address]  Dept: [phone]  Dept Fax: [phone]  Loc: [phone]    Visit Date: 8/9/2023    SUBJECTIVE DATA       CHIEF COMPLAINT:    Chief Complaint   Patient presents with    Follow-up    Medication Check    Medication Refill    Mental Health Problem        History obtained from: patient    HISTORY OF PRESENT ILLNESS:    Boni Chino is a 33 y.o. female who presents for follow up with medication management. HPI  Patient presents calm and cooperative. Patient states feeling \"stressed out\" today. Patient endorses medication compliance, stated medications are working, symptoms improving. She endorses hydroxyzine is helping with anxiety. She reports her mood has been stable. However patient endorses mind racing, stress and anxiety, with decreased appetite. She also endorses lacking motivation, and irritability some days. Patient reports still having social anxiety, has difficulty going places. Patient reports going to a [employer] on a date last week, found it peaceful and relaxing as it gave her memories of being with her grandfather as they went hunting together when she was young. Patient reports she is \"falling more into debt\"  and \"worried\" she \"will lose her home and car\" causing most of her anxiety. Stated she is starting a new job next week that pays more money. Discussed medication management with patient. Patient agreed with plan to increase Brexpiprazole for mood, depression and anxiety, and continue hydroxyzine prn. Patient rates depression 2-3/10 and anxiety 10/10 today on a scale of 0-10 with 10 being the worst.  Sleep- able to fall asleep, able to stay asleep, not feeling rested. Endorses getting 6-7 hours continuous sleep.    Endorses mind racing  Interest- diminished  Energy and motivation - diminished   Concentration - improving  Memory - improving  Appetite-

## 2023-08-09 ENCOUNTER — TELEPHONE (OUTPATIENT)
Dept: PSYCHIATRY | Age: 33
End: 2023-08-09

## 2023-08-09 ENCOUNTER — OFFICE VISIT (OUTPATIENT)
Dept: PSYCHIATRY | Age: 33
End: 2023-08-09

## 2023-08-09 DIAGNOSIS — F60.3 BORDERLINE PERSONALITY DISORDER (HCC): ICD-10-CM

## 2023-08-09 DIAGNOSIS — F31.30 BIPOLAR I DISORDER, MOST RECENT EPISODE (OR CURRENT) DEPRESSED (HCC): Primary | ICD-10-CM

## 2023-08-09 DIAGNOSIS — F41.1 GAD (GENERALIZED ANXIETY DISORDER): ICD-10-CM

## 2023-08-09 DIAGNOSIS — F43.12 CHRONIC POST-TRAUMATIC STRESS DISORDER (PTSD): ICD-10-CM

## 2023-08-09 DIAGNOSIS — F40.10 SOCIAL ANXIETY DISORDER: ICD-10-CM

## 2023-08-09 DIAGNOSIS — G47.00 INSOMNIA, PERSISTENT: ICD-10-CM

## 2023-08-09 RX ORDER — HYDROXYZINE HYDROCHLORIDE 25 MG/1
25 TABLET, FILM COATED ORAL 3 TIMES DAILY PRN
Qty: 90 TABLET | Refills: 1 | Status: SHIPPED | OUTPATIENT
Start: 2023-08-09

## 2023-08-09 ASSESSMENT — PATIENT HEALTH QUESTIONNAIRE - PHQ9
SUM OF ALL RESPONSES TO PHQ QUESTIONS 1-9: 8
3. TROUBLE FALLING OR STAYING ASLEEP: 0
7. TROUBLE CONCENTRATING ON THINGS, SUCH AS READING THE NEWSPAPER OR WATCHING TELEVISION: 1
6. FEELING BAD ABOUT YOURSELF - OR THAT YOU ARE A FAILURE OR HAVE LET YOURSELF OR YOUR FAMILY DOWN: 1
10. IF YOU CHECKED OFF ANY PROBLEMS, HOW DIFFICULT HAVE THESE PROBLEMS MADE IT FOR YOU TO DO YOUR WORK, TAKE CARE OF THINGS AT HOME, OR GET ALONG WITH OTHER PEOPLE: 1
4. FEELING TIRED OR HAVING LITTLE ENERGY: 1
2. FEELING DOWN, DEPRESSED OR HOPELESS: 1
SUM OF ALL RESPONSES TO PHQ9 QUESTIONS 1 & 2: 1
1. LITTLE INTEREST OR PLEASURE IN DOING THINGS: 0
SUM OF ALL RESPONSES TO PHQ QUESTIONS 1-9: 8
8. MOVING OR SPEAKING SO SLOWLY THAT OTHER PEOPLE COULD HAVE NOTICED. OR THE OPPOSITE, BEING SO FIGETY OR RESTLESS THAT YOU HAVE BEEN MOVING AROUND A LOT MORE THAN USUAL: 1
9. THOUGHTS THAT YOU WOULD BE BETTER OFF DEAD, OR OF HURTING YOURSELF: 0
5. POOR APPETITE OR OVEREATING: 3

## 2023-08-09 ASSESSMENT — ANXIETY QUESTIONNAIRES
4. TROUBLE RELAXING: 2
6. BECOMING EASILY ANNOYED OR IRRITABLE: 2
IF YOU CHECKED OFF ANY PROBLEMS ON THIS QUESTIONNAIRE, HOW DIFFICULT HAVE THESE PROBLEMS MADE IT FOR YOU TO DO YOUR WORK, TAKE CARE OF THINGS AT HOME, OR GET ALONG WITH OTHER PEOPLE: SOMEWHAT DIFFICULT
3. WORRYING TOO MUCH ABOUT DIFFERENT THINGS: 2
1. FEELING NERVOUS, ANXIOUS, OR ON EDGE: 2
5. BEING SO RESTLESS THAT IT IS HARD TO SIT STILL: 2
GAD7 TOTAL SCORE: 13
7. FEELING AFRAID AS IF SOMETHING AWFUL MIGHT HAPPEN: 1
2. NOT BEING ABLE TO STOP OR CONTROL WORRYING: 2

## 2023-08-09 ASSESSMENT — COLUMBIA-SUICIDE SEVERITY RATING SCALE - C-SSRS
6. HAVE YOU EVER DONE ANYTHING, STARTED TO DO ANYTHING, OR PREPARED TO DO ANYTHING TO END YOUR LIFE?: NO
1. WITHIN THE PAST MONTH, HAVE YOU WISHED YOU WERE DEAD OR WISHED YOU COULD GO TO SLEEP AND NOT WAKE UP?: NO
2. HAVE YOU ACTUALLY HAD ANY THOUGHTS OF KILLING YOURSELF?: NO

## 2023-08-09 NOTE — TELEPHONE ENCOUNTER
Spoke to patient and she stated it will take 2-3 weeks for change in insurance. I looked on [facility] for any discounts and they don't offer any. I called pharmacy and they stated she can look at the [employer] web sight for possible assistant programs. Patient wasn't sure if he PCP would give her samples so I told patient I will call her PCP and find out and that I would let her know. Patient verbally understood.

## 2023-08-09 NOTE — TELEPHONE ENCOUNTER
Patient called in regarding her Rexulti prescription. Pharmacy notified patient that a PA was done and it was approved but she would have to pay 1000.00$ out of pocket. Patient stated she has a couple pills left. Patient stated she is in between insurances at the moment. Patient stated she is starting a new job and waiting for insurance to change. Pending your advice.

## 2023-08-09 NOTE — TELEPHONE ENCOUNTER
How long before insurance will start back in? Would she be able to get samples from her PCP office or the rep to cover her for a short period until has insurance again? If not then we may need to change medications.    Thank you,  Leila Quintero

## 2023-08-09 NOTE — TELEPHONE ENCOUNTER
Please see provider's response. Depending on time frame of getting new insurance; she can apply for patient assistance if able to get a sample from PCP to bridge that as well?

## 2023-08-10 ENCOUNTER — TELEPHONE (OUTPATIENT)
Dept: FAMILY MEDICINE CLINIC | Age: 33
End: 2023-08-10

## 2023-08-10 NOTE — TELEPHONE ENCOUNTER
Eagle Louie from [facility], [address] [facility] office P[facility]RO [facility]AM[facility]) called to see if the office had any samples of Rexulti 1 mg the pt could use while she is waiting 2-3 weeks for insurance coverage. The Rx will cost her $1,000 out of pocket. Rexulti 0.5 mg/1 mg starter pack and 2 mg tables are available. Please advise if OK for pt to have samples. Call pt with response.

## 2023-08-10 NOTE — TELEPHONE ENCOUNTER
Please let me know if PCP cannot give samples. Please assist patient with which other antipsychotic medications she could get for low price on GoodRx, or samples from PCP, etc.  Such as Lurasidone, Olanzapine, or Cariprazine. Then let me know which of these medications she could afford. NOTE: she has previously tried Seroquel and Caplyta.     Thank you,  Navneet Daniels

## 2023-08-11 NOTE — TELEPHONE ENCOUNTER
I spoke with Wayne Dumont, she said that Germaine's office is able to provide samples to her until her new insurance kicks in. She said that she will be given 6 boxes of Rexulti samples.

## 2023-08-11 NOTE — TELEPHONE ENCOUNTER
LM on  notifying the pt that samples are ready for her to . [facility] was also notified samples were provided.

## 2023-08-11 NOTE — TELEPHONE ENCOUNTER
Breanna Woodruff's office also called into the office stating that they had left Olivier Montero a message that they are able to provide her with Rexulti samples.

## 2023-09-14 ENCOUNTER — HOSPITAL ENCOUNTER (EMERGENCY)
Age: 33
Discharge: HOME | End: 2023-09-14
Attending: EMERGENCY MEDICINE
Payer: COMMERCIAL

## 2023-09-14 ENCOUNTER — APPOINTMENT (OUTPATIENT)
Dept: GENERAL RADIOLOGY | Age: 33
End: 2023-09-14
Payer: COMMERCIAL

## 2023-09-14 VITALS
OXYGEN SATURATION: 99 % | HEART RATE: 69 BPM | TEMPERATURE: 98.1 F | RESPIRATION RATE: 18 BRPM | SYSTOLIC BLOOD PRESSURE: 106 MMHG | DIASTOLIC BLOOD PRESSURE: 77 MMHG

## 2023-09-14 DIAGNOSIS — R07.89 CHEST WALL PAIN: ICD-10-CM

## 2023-09-14 DIAGNOSIS — R10.9 FLANK PAIN: Primary | ICD-10-CM

## 2023-09-14 LAB
ALBUMIN SERPL BCG-MCNC: 4.6 G/DL (ref 3.5–5.1)
ALP SERPL-CCNC: 139 U/L (ref 38–126)
ALT SERPL W/O P-5'-P-CCNC: 69 U/L (ref 11–66)
ANION GAP SERPL CALC-SCNC: 11 MEQ/L (ref 8–16)
AST SERPL-CCNC: 27 U/L (ref 5–40)
BASOPHILS ABSOLUTE: 0 THOU/MM3 (ref 0–0.1)
BASOPHILS NFR BLD AUTO: 0.4 %
BILIRUB SERPL-MCNC: 0.4 MG/DL (ref 0.3–1.2)
BILIRUB UR QL STRIP.AUTO: NEGATIVE
BUN SERPL-MCNC: 11 MG/DL (ref 7–22)
CALCIUM SERPL-MCNC: 9.8 MG/DL (ref 8.5–10.5)
CHARACTER UR: CLEAR
CHLORIDE SERPL-SCNC: 104 MEQ/L (ref 98–111)
CO2 SERPL-SCNC: 27 MEQ/L (ref 23–33)
COLOR: YELLOW
CREAT SERPL-MCNC: 0.7 MG/DL (ref 0.4–1.2)
DEPRECATED RDW RBC AUTO: 40.1 FL (ref 35–45)
EOSINOPHIL NFR BLD AUTO: 0.8 %
EOSINOPHILS ABSOLUTE: 0 THOU/MM3 (ref 0–0.4)
ERYTHROCYTE [DISTWIDTH] IN BLOOD BY AUTOMATED COUNT: 11.9 % (ref 11.5–14.5)
FLUAV RNA RESP QL NAA+PROBE: NOT DETECTED
FLUBV RNA RESP QL NAA+PROBE: NOT DETECTED
GFR SERPL CREATININE-BSD FRML MDRD: > 60 ML/MIN/1.73M2
GLUCOSE SERPL-MCNC: 85 MG/DL (ref 70–108)
GLUCOSE UR QL STRIP.AUTO: NEGATIVE MG/DL
HCT VFR BLD AUTO: 39 % (ref 37–47)
HGB BLD-MCNC: 13.2 GM/DL (ref 12–16)
HGB UR QL STRIP.AUTO: NEGATIVE
IMM GRANULOCYTES # BLD AUTO: 0.01 THOU/MM3 (ref 0–0.07)
IMM GRANULOCYTES NFR BLD AUTO: 0.2 %
KETONES UR QL STRIP.AUTO: NEGATIVE
LIPASE SERPL-CCNC: 24.6 U/L (ref 5.6–51.3)
LYMPHOCYTES ABSOLUTE: 1.2 THOU/MM3 (ref 1–4.8)
LYMPHOCYTES NFR BLD AUTO: 22.9 %
MAGNESIUM SERPL-MCNC: 2.1 MG/DL (ref 1.6–2.4)
MCH RBC QN AUTO: 31.4 PG (ref 26–33)
MCHC RBC AUTO-ENTMCNC: 33.8 GM/DL (ref 32.2–35.5)
MCV RBC AUTO: 92.6 FL (ref 81–99)
MONOCYTES ABSOLUTE: 0.4 THOU/MM3 (ref 0.4–1.3)
MONOCYTES NFR BLD AUTO: 8 %
NEUTROPHILS NFR BLD AUTO: 67.7 %
NITRITE UR QL STRIP: NEGATIVE
NRBC BLD AUTO-RTO: 0 /100 WBC
OSMOLALITY SERPL CALC.SUM OF ELEC: 281.8 MOSMOL/KG (ref 275–300)
PH UR STRIP.AUTO: 7 [PH] (ref 5–9)
PLATELET # BLD AUTO: 290 THOU/MM3 (ref 130–400)
PMV BLD AUTO: 9.6 FL (ref 9.4–12.4)
POTASSIUM SERPL-SCNC: 4.3 MEQ/L (ref 3.5–5.2)
PROT SERPL-MCNC: 7.7 G/DL (ref 6.1–8)
PROT UR STRIP.AUTO-MCNC: NEGATIVE MG/DL
RBC # BLD AUTO: 4.21 MILL/MM3 (ref 4.2–5.4)
SARS-COV-2 RNA RESP QL NAA+PROBE: NOT DETECTED
SEGMENTED NEUTROPHILS ABSOLUTE COUNT: 3.5 THOU/MM3 (ref 1.8–7.7)
SODIUM SERPL-SCNC: 142 MEQ/L (ref 135–145)
SP GR UR REFRACT.AUTO: 1 (ref 1–1.03)
TROPONIN, HIGH SENSITIVITY: < 6 NG/L (ref 0–12)
UROBILINOGEN, URINE: 0.2 EU/DL (ref 0–1)
WBC # BLD AUTO: 5.2 THOU/MM3 (ref 4.8–10.8)
WBC #/AREA URNS HPF: NEGATIVE /[HPF]

## 2023-09-14 PROCEDURE — 2580000003 HC RX 258: Performed by: EMERGENCY MEDICINE

## 2023-09-14 PROCEDURE — 83735 ASSAY OF MAGNESIUM: CPT

## 2023-09-14 PROCEDURE — 96374 THER/PROPH/DIAG INJ IV PUSH: CPT

## 2023-09-14 PROCEDURE — 6360000002 HC RX W HCPCS: Performed by: EMERGENCY MEDICINE

## 2023-09-14 PROCEDURE — 96375 TX/PRO/DX INJ NEW DRUG ADDON: CPT

## 2023-09-14 PROCEDURE — 36415 COLL VENOUS BLD VENIPUNCTURE: CPT

## 2023-09-14 PROCEDURE — 83690 ASSAY OF LIPASE: CPT

## 2023-09-14 PROCEDURE — 93010 ELECTROCARDIOGRAM REPORT: CPT | Performed by: INTERNAL MEDICINE

## 2023-09-14 PROCEDURE — 87636 SARSCOV2 & INF A&B AMP PRB: CPT

## 2023-09-14 PROCEDURE — 99285 EMERGENCY DEPT VISIT HI MDM: CPT

## 2023-09-14 PROCEDURE — 85025 COMPLETE CBC W/AUTO DIFF WBC: CPT

## 2023-09-14 PROCEDURE — 71045 X-RAY EXAM CHEST 1 VIEW: CPT

## 2023-09-14 PROCEDURE — 84484 ASSAY OF TROPONIN QUANT: CPT

## 2023-09-14 PROCEDURE — 80053 COMPREHEN METABOLIC PANEL: CPT

## 2023-09-14 PROCEDURE — 93005 ELECTROCARDIOGRAM TRACING: CPT | Performed by: EMERGENCY MEDICINE

## 2023-09-14 PROCEDURE — 81003 URINALYSIS AUTO W/O SCOPE: CPT

## 2023-09-14 RX ORDER — 0.9 % SODIUM CHLORIDE 0.9 %
1000 INTRAVENOUS SOLUTION INTRAVENOUS ONCE
Status: COMPLETED | OUTPATIENT
Start: 2023-09-14 | End: 2023-09-14

## 2023-09-14 RX ORDER — HYDROCODONE BITARTRATE AND ACETAMINOPHEN 5; 325 MG/1; MG/1
1 TABLET ORAL EVERY 6 HOURS PRN
Qty: 8 TABLET | Refills: 0 | Status: SHIPPED | OUTPATIENT
Start: 2023-09-14 | End: 2023-09-14 | Stop reason: SDUPTHER

## 2023-09-14 RX ORDER — ONDANSETRON 2 MG/ML
4 INJECTION INTRAMUSCULAR; INTRAVENOUS ONCE
Status: COMPLETED | OUTPATIENT
Start: 2023-09-14 | End: 2023-09-14

## 2023-09-14 RX ORDER — HYDROCODONE BITARTRATE AND ACETAMINOPHEN 5; 325 MG/1; MG/1
1 TABLET ORAL EVERY 6 HOURS PRN
Qty: 8 TABLET | Refills: 0 | Status: SHIPPED | OUTPATIENT
Start: 2023-09-14 | End: 2023-09-16

## 2023-09-14 RX ORDER — FENTANYL CITRATE 50 UG/ML
50 INJECTION, SOLUTION INTRAMUSCULAR; INTRAVENOUS ONCE
Status: COMPLETED | OUTPATIENT
Start: 2023-09-14 | End: 2023-09-14

## 2023-09-14 RX ADMIN — SODIUM CHLORIDE 1000 ML: 9 INJECTION, SOLUTION INTRAVENOUS at 11:00

## 2023-09-14 RX ADMIN — ONDANSETRON 4 MG: 2 INJECTION INTRAMUSCULAR; INTRAVENOUS at 11:28

## 2023-09-14 RX ADMIN — FENTANYL CITRATE 50 MCG: 50 INJECTION, SOLUTION INTRAMUSCULAR; INTRAVENOUS at 11:28

## 2023-09-14 ASSESSMENT — PAIN DESCRIPTION - LOCATION: LOCATION: CHEST;FLANK

## 2023-09-14 ASSESSMENT — PAIN SCALES - GENERAL
PAINLEVEL_OUTOF10: 9
PAINLEVEL_OUTOF10: 6

## 2023-09-14 ASSESSMENT — PAIN - FUNCTIONAL ASSESSMENT: PAIN_FUNCTIONAL_ASSESSMENT: 0-10

## 2023-09-14 ASSESSMENT — PAIN DESCRIPTION - ORIENTATION: ORIENTATION: LEFT

## 2023-09-14 NOTE — DISCHARGE INSTRUCTIONS
You were seen at [facility] emergency department for flank pain. We did not find any life-threatening cause of her flank pain or chest pain. At this time, you are able to return home. Please do not hesitate to return the emergency department if your pain worsens, or if you develop a fever that does not respond to Tylenol. Otherwise, please follow-up with your primary doctor within the next week.

## 2023-09-14 NOTE — ED NOTES
Patient presents to ED with c/o chest pain and L sided flank pain. States that she has known kidney stones on the L side and feels like she is trying to pass some. EKG completed in triage. Call light in reach.       Ton Alvarez RN  09/14/23 1100

## 2023-09-14 NOTE — ED NOTES
Patient resting in bed. Updated on plan of care. Call light in reach.       Tyler Salguero RN  09/14/23 2934

## 2023-09-14 NOTE — ED NOTES
Patient resting in bed. Updated on plan of care. Denies any needs. Call light in reach.       Evelia Villanueva RN  09/14/23 8222

## 2023-09-15 LAB
EKG ATRIAL RATE: 74 BPM
EKG P AXIS: 17 DEGREES
EKG P-R INTERVAL: 168 MS
EKG Q-T INTERVAL: 382 MS
EKG QRS DURATION: 90 MS
EKG QTC CALCULATION (BAZETT): 424 MS
EKG R AXIS: 61 DEGREES
EKG T AXIS: 59 DEGREES
EKG VENTRICULAR RATE: 74 BPM

## 2023-09-17 NOTE — ED PROVIDER NOTES
oriented to person, place, and time. DIFFERENTIALS   Initial Assessment: Given the patient's above chief complaint and findings on history and physical examination, I thought it was appropriate to consider the following emergency medical conditions:    UTI, pyelo, kidney stone, CAP, COVID/flu, other viral illness     Plan: COVID and flu swabs, CXR, UA, CBC, CMP    Although some of these diagnoses are unlikely they were considered in my medical decision making. Chronic Conditions considered:   Patient Active Problem List   Diagnosis    Bipolar I disorder, most recent episode hypomanic ([address])    Borderline personality disorder ([address])    MARGO (generalized anxiety disorder)    Insomnia, persistent    Chronic post-traumatic stress disorder (PTSD)    Bipolar I disorder, most recent episode (or current) depressed ([address])    Social anxiety disorder       ED RESULTS   Laboratory results:  Labs Reviewed   COMPREHENSIVE METABOLIC PANEL - Abnormal; Notable for the following components:       Result Value    Alkaline Phosphatase 139 (*)     ALT 69 (*)     All other components within normal limits   COVID-19 & INFLUENZA COMBO   CBC WITH AUTO DIFFERENTIAL   LIPASE   MAGNESIUM   TROPONIN   URINALYSIS WITH REFLEX TO CULTURE   ANION GAP   OSMOLALITY   GLOMERULAR FILTRATION RATE, ESTIMATED       Radiologic studies results:  XR CHEST PORTABLE   Final Result   1. No acute cardiopulmonary finding. **This report has been created using voice recognition software. It may contain minor errors which are inherent in voice recognition technology. **      Final report electronically signed by Dr Debbie Fontenot on 9/14/2023 12:42 PM          ED Medications administered this visit:   Medications   sodium chloride 0.9 % bolus 1,000 mL (0 mLs IntraVENous Stopped 9/14/23 1210)   fentaNYL (SUBLIMAZE) injection 50 mcg (50 mcg IntraVENous Given 9/14/23 1128)   ondansetron (ZOFRAN) injection 4 mg (4 mg IntraVENous Given 9/14/23 1128)

## 2023-12-05 DIAGNOSIS — F60.3 BORDERLINE PERSONALITY DISORDER (HCC): ICD-10-CM

## 2023-12-05 DIAGNOSIS — F40.10 SOCIAL ANXIETY DISORDER: ICD-10-CM

## 2023-12-05 DIAGNOSIS — F43.12 CHRONIC POST-TRAUMATIC STRESS DISORDER (PTSD): ICD-10-CM

## 2023-12-05 DIAGNOSIS — G47.00 INSOMNIA, PERSISTENT: ICD-10-CM

## 2023-12-05 DIAGNOSIS — F41.1 GAD (GENERALIZED ANXIETY DISORDER): ICD-10-CM

## 2023-12-05 DIAGNOSIS — F31.30 BIPOLAR I DISORDER, MOST RECENT EPISODE (OR CURRENT) DEPRESSED (HCC): ICD-10-CM

## 2023-12-05 RX ORDER — HYDROXYZINE HYDROCHLORIDE 25 MG/1
25 TABLET, FILM COATED ORAL 3 TIMES DAILY PRN
Qty: 90 TABLET | Refills: 1 | Status: SHIPPED | OUTPATIENT
Start: 2023-12-05

## 2023-12-05 NOTE — TELEPHONE ENCOUNTER
Patient called to request refill of Hydroxyzine. Reports she called previously stating Rexulti was over $1000 out of pocket and she was unable to afford this. Due to this, she has been \"doubling up\" on hydroxyzine. Patient reports she is unable to schedule appointment at this time. Reports she will have insurance in the next several weeks. Patient asks if provider would be able to send refill request to pharmacy in the interim? Patient is aware of [facility] Reports she was denied due to living with boyfriend who makes too much money. Rx pending. Please advise otherwise.      Requested Prescriptions     Pending Prescriptions Disp Refills    hydrOXYzine HCl (ATARAX) 25 MG tablet 90 tablet 1     Sig: Take 1 tablet by mouth 3 times daily as needed for Anxiety (and insomnia)       Date of last visit: 8/9/2023  Date of next visit (if applicable):Visit date not found  Pharmacy Name: [facility] [place], [place]
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
## 2024-02-14 ENCOUNTER — OFFICE VISIT (OUTPATIENT)
Dept: PSYCHIATRY | Age: 34
End: 2024-02-14
Payer: COMMERCIAL

## 2024-02-14 DIAGNOSIS — F41.1 GAD (GENERALIZED ANXIETY DISORDER): ICD-10-CM

## 2024-02-14 DIAGNOSIS — F39 UNSPECIFIED MOOD (AFFECTIVE) DISORDER (HCC): ICD-10-CM

## 2024-02-14 DIAGNOSIS — F60.3 BORDERLINE PERSONALITY DISORDER (HCC): ICD-10-CM

## 2024-02-14 DIAGNOSIS — R53.82 CHRONIC FATIGUE: Primary | ICD-10-CM

## 2024-02-14 DIAGNOSIS — F43.12 CHRONIC POST-TRAUMATIC STRESS DISORDER (PTSD): ICD-10-CM

## 2024-02-14 PROCEDURE — 99214 OFFICE O/P EST MOD 30 MIN: CPT

## 2024-02-14 PROCEDURE — 90836 PSYTX W PT W E/M 45 MIN: CPT

## 2024-02-14 RX ORDER — HYDROXYZINE 50 MG/1
50 TABLET, FILM COATED ORAL NIGHTLY PRN
Qty: 30 TABLET | Refills: 2 | Status: SHIPPED | OUTPATIENT
Start: 2024-02-14

## 2024-03-22 ENCOUNTER — OFFICE VISIT (OUTPATIENT)
Dept: PULMONOLOGY | Age: 34
End: 2024-03-22
Payer: COMMERCIAL

## 2024-03-22 VITALS
HEART RATE: 80 BPM | HEIGHT: 66 IN | OXYGEN SATURATION: 98 % | BODY MASS INDEX: 26.23 KG/M2 | TEMPERATURE: 97.5 F | DIASTOLIC BLOOD PRESSURE: 88 MMHG | SYSTOLIC BLOOD PRESSURE: 124 MMHG | WEIGHT: 163.2 LBS

## 2024-03-22 DIAGNOSIS — G47.19 EXCESSIVE DAYTIME SLEEPINESS: Primary | ICD-10-CM

## 2024-03-22 DIAGNOSIS — R06.83 SNORING: ICD-10-CM

## 2024-03-22 PROCEDURE — 99204 OFFICE O/P NEW MOD 45 MIN: CPT | Performed by: INTERNAL MEDICINE

## 2024-03-22 NOTE — PROGRESS NOTES
distress.  No wheezes. No rales.   Abdominal: Soft. No distension. No tenderness.   Musculoskeletal: Normal range of motion.   Lymphadenopathy:  No cervical adenopathy.   Neurological: Alert and oriented to person, place, and time. No focal deficits.  Skin: Skin is warm and dry. Patient is not diaphoretic.   Psychiatric: Normal behavior with normal mood and affect.    Diagnostic Data:    Assessment   Excessive daytime sleepiness  Non-restful sleep  Snoring       Plan   PSG and MSLT to evaluate for idiopathic hypersomnia and r/o SUMI  Advised Pt to cut back on amount of melatonin as it is a high dose and may be contributing to her daytime symptoms  Advised pt to keep a sleep journal and bring to next appt  Discussed sleep hygiene techniques      -She was advised to call [employer] regarding supplies if needed.  -She call my office for earlier appointment if needed for worsening of sleep symptoms.  -Sabina Bocanegra educated about my impression and plan. Patient verbalizes understanding.

## 2024-05-06 DIAGNOSIS — F39 UNSPECIFIED MOOD (AFFECTIVE) DISORDER (HCC): ICD-10-CM

## 2024-05-06 DIAGNOSIS — F41.1 GAD (GENERALIZED ANXIETY DISORDER): Primary | ICD-10-CM

## 2024-05-06 DIAGNOSIS — F60.3 BORDERLINE PERSONALITY DISORDER (HCC): ICD-10-CM

## 2024-05-06 DIAGNOSIS — F43.12 CHRONIC POST-TRAUMATIC STRESS DISORDER (PTSD): ICD-10-CM

## 2024-05-06 RX ORDER — HYDROXYZINE 50 MG/1
50-100 TABLET, FILM COATED ORAL NIGHTLY PRN
Qty: 60 TABLET | Refills: 0 | Status: SHIPPED | OUTPATIENT
Start: 2024-05-06

## 2024-05-06 NOTE — TELEPHONE ENCOUNTER
Dr. Cespedes, please see previous message and advise if agreeable to take patient onto case load. Thank you

## 2024-05-06 NOTE — TELEPHONE ENCOUNTER
Staff contacted patient regarding appointment scheduled 05/22/2024 that needs to be rescheduled. Patient voiced frustrations regarding appointment needing to be rescheduled. Patient reports this has happened a couple of times. Patient reports she needs a refill on her hydroxyzine 50 mg as she has needed two tablets daily due to the amount of stress/anxiety she is having.     At this time, patient requests to transfer care to a psychiatrist within the office. Patient feels it would be in her best interest to see a psychiatrist instead of a nurse practitioner of psychiatry as she was seeing a psychiatrist prior to establishing with current provider. Patient feels she has not been heard during sessions and certain things that have been documented in her chart have been taken out of context. Please advise if agreeable to transfer. Refill pending.

## 2024-05-06 NOTE — TELEPHONE ENCOUNTER
Sorry for the inconvenience of needing to be rescheduled. Prescription refilled.  I am okay with transfer to another provider.     Maryam

## 2024-05-13 NOTE — TELEPHONE ENCOUNTER
Apologies, routed previous message back to Ginny Bartlett. Dr. Cespedes, please see previous message and advise. Thank you

## 2024-05-13 NOTE — PROGRESS NOTES
Advised Sabina to not take melatonin on the night of her study.  She may bring her Hydroxine to aid in sleep onset.  Sabina states she has severe eczema and has to use her prescribed cream on her arms and legs.  Counseled her on trying to avoid area on legs where limb movement patches are placed.

## 2024-05-14 ENCOUNTER — HOSPITAL ENCOUNTER (OUTPATIENT)
Dept: SLEEP CENTER | Age: 34
Discharge: HOME | End: 2024-05-16
Payer: COMMERCIAL

## 2024-05-14 DIAGNOSIS — G47.19 EXCESSIVE DAYTIME SLEEPINESS: ICD-10-CM

## 2024-05-14 DIAGNOSIS — R06.83 SNORING: ICD-10-CM

## 2024-05-14 PROCEDURE — 95810 POLYSOM 6/> YRS 4/> PARAM: CPT

## 2024-05-15 ENCOUNTER — HOSPITAL ENCOUNTER (OUTPATIENT)
Dept: SLEEP CENTER | Age: 34
Discharge: HOME | End: 2024-05-17
Payer: COMMERCIAL

## 2024-05-15 DIAGNOSIS — R06.83 SNORING: ICD-10-CM

## 2024-05-15 DIAGNOSIS — G47.10 HYPERSOMNIA: ICD-10-CM

## 2024-05-15 DIAGNOSIS — G47.10 HYPERSOMNIA: Primary | ICD-10-CM

## 2024-05-15 LAB
AMPHETAMINES UR QL SCN: NEGATIVE
BARBITURATES UR QL SCN: NEGATIVE
BENZODIAZ UR QL SCN: NEGATIVE
BZE UR QL SCN: NEGATIVE
CANNABINOIDS UR QL SCN: NEGATIVE
FENTANYL: NEGATIVE
OPIATES UR QL SCN: NEGATIVE
OXYCODONE: NEGATIVE
PCP UR QL SCN: NEGATIVE

## 2024-05-15 PROCEDURE — 80307 DRUG TEST PRSMV CHEM ANLYZR: CPT

## 2024-05-15 PROCEDURE — 95805 MULTIPLE SLEEP LATENCY TEST: CPT
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
## 2024-05-16 ENCOUNTER — OFFICE VISIT (OUTPATIENT)
Dept: PULMONOLOGY | Age: 34
End: 2024-05-16
Payer: COMMERCIAL

## 2024-05-16 VITALS
OXYGEN SATURATION: 97 % | HEIGHT: 66 IN | HEART RATE: 92 BPM | TEMPERATURE: 98.1 F | SYSTOLIC BLOOD PRESSURE: 124 MMHG | WEIGHT: 162.8 LBS | DIASTOLIC BLOOD PRESSURE: 74 MMHG | BODY MASS INDEX: 26.16 KG/M2

## 2024-05-16 DIAGNOSIS — F41.1 GAD (GENERALIZED ANXIETY DISORDER): ICD-10-CM

## 2024-05-16 DIAGNOSIS — G47.19 EXCESSIVE DAYTIME SLEEPINESS: Primary | ICD-10-CM

## 2024-05-16 DIAGNOSIS — G47.00 INSOMNIA, PERSISTENT: ICD-10-CM

## 2024-05-16 DIAGNOSIS — F43.12 CHRONIC POST-TRAUMATIC STRESS DISORDER (PTSD): ICD-10-CM

## 2024-05-16 PROCEDURE — 99214 OFFICE O/P EST MOD 30 MIN: CPT | Performed by: NURSE PRACTITIONER

## 2024-05-16 ASSESSMENT — ENCOUNTER SYMPTOMS
SHORTNESS OF BREATH: 1
CHEST TIGHTNESS: 1

## 2024-05-23 ENCOUNTER — HOSPITAL ENCOUNTER (EMERGENCY)
Age: 34
Discharge: HOME | End: 2024-05-23
Payer: COMMERCIAL

## 2024-05-23 VITALS
WEIGHT: 162 LBS | RESPIRATION RATE: 18 BRPM | OXYGEN SATURATION: 98 % | DIASTOLIC BLOOD PRESSURE: 86 MMHG | TEMPERATURE: 98 F | HEART RATE: 86 BPM | HEIGHT: 66 IN | BODY MASS INDEX: 26.03 KG/M2 | SYSTOLIC BLOOD PRESSURE: 118 MMHG

## 2024-05-23 DIAGNOSIS — L23.7 POISON IVY DERMATITIS: Primary | ICD-10-CM

## 2024-05-23 PROCEDURE — 99213 OFFICE O/P EST LOW 20 MIN: CPT

## 2024-05-23 PROCEDURE — 99202 OFFICE O/P NEW SF 15 MIN: CPT | Performed by: NURSE PRACTITIONER

## 2024-05-23 RX ORDER — PREDNISONE 20 MG/1
TABLET ORAL
Qty: 9 TABLET | Refills: 0 | Status: SHIPPED | OUTPATIENT
Start: 2024-05-23 | End: 2024-05-29

## 2024-05-23 ASSESSMENT — PAIN - FUNCTIONAL ASSESSMENT: PAIN_FUNCTIONAL_ASSESSMENT: NONE - DENIES PAIN

## 2024-05-23 ASSESSMENT — ENCOUNTER SYMPTOMS: SHORTNESS OF BREATH: 0

## 2024-05-23 NOTE — ED PROVIDER NOTES
[facility] URGENT CARE  Urgent Care Encounter       CHIEF COMPLAINT       Chief Complaint   Patient presents with    Rash       Nurses Notes reviewed and I agree except as noted in the HPI.  HISTORY OF PRESENT ILLNESS   Sabina Bocanegra is a 33 y.o. female who presents with a poison ivy rash that started 3 days ago after working in her yard on Sunday.  Complains of a rash to her bilateral legs, arms, and abdomen.  She has tried applying calamine lotion and takes hydroxyzine.  There has been no improvement.  Denies any fever.    The history is provided by the patient.       REVIEW OF SYSTEMS     Review of Systems   Constitutional:  Negative for fever.   Respiratory:  Negative for shortness of breath.    Musculoskeletal:  Negative for joint swelling and myalgias.   Skin:  Positive for rash (bilateral arms, legs, and abdomen).   Neurological:  Negative for weakness.       PAST MEDICAL HISTORY         Diagnosis Date    Anxiety     Depression     Drug abuse (HCC)        SURGICALHISTORY     Patient  has a past surgical history that includes Hysterectomy and Appendectomy.    CURRENT MEDICATIONS       Previous Medications    HYDROXYZINE HCL (ATARAX) 50 MG TABLET    Take 1-2 tablets by mouth nightly as needed for Anxiety (and insomnia)       ALLERGIES     Patient is is allergic to carafate [sucralfate], keflex [cephalexin], morphine, and mushroom extract complex.    Patients   There is no immunization history on file for this patient.    FAMILY HISTORY     Patient's family history includes Other in her mother.    SOCIAL HISTORY     Patient  reports that she has quit smoking. She has never used smokeless tobacco. She reports that she does not currently use alcohol. She reports that she does not currently use drugs.    PHYSICAL EXAM     ED TRIAGE VITALS  BP: 118/86, Temp: 98 °F (36.7 °C), Pulse: 86, Respirations: 18, SpO2: 98 %,Estimated body mass index is 26.15 kg/m² as calculated from the following:    Height as  Jese, FELIZ - CNP  05/23/24 110

## 2024-05-23 NOTE — ED NOTES
Patient presents to [facility] by self with complaints of poison ivy rash that appeared Sunday. Patient has pink cream on right arm and parts of her left leg.      Marilyn Bethea, RN  05/23/24 8575

## 2024-06-04 DIAGNOSIS — F60.3 BORDERLINE PERSONALITY DISORDER (HCC): ICD-10-CM

## 2024-06-04 DIAGNOSIS — F41.1 GAD (GENERALIZED ANXIETY DISORDER): ICD-10-CM

## 2024-06-04 DIAGNOSIS — F43.12 CHRONIC POST-TRAUMATIC STRESS DISORDER (PTSD): ICD-10-CM

## 2024-06-04 RX ORDER — HYDROXYZINE 50 MG/1
50-100 TABLET, FILM COATED ORAL NIGHTLY PRN
Qty: 60 TABLET | Refills: 0 | Status: SHIPPED | OUTPATIENT
Start: 2024-06-04

## 2024-06-04 NOTE — TELEPHONE ENCOUNTER
Patient is scheduled to establish with Dr. Cespedes 06/24/2024. Requests refill in the meantime.     Requested Prescriptions     Pending Prescriptions Disp Refills    hydrOXYzine HCl (ATARAX) 50 MG tablet 60 tablet 0     Sig: Take 1-2 tablets by mouth nightly as needed for Anxiety (and insomnia)       Date of last visit: 2/14/2024  Pharmacy Name: [facility]

## 2024-06-24 ENCOUNTER — TELEMEDICINE (OUTPATIENT)
Dept: PSYCHIATRY | Age: 34
End: 2024-06-24
Payer: COMMERCIAL

## 2024-06-24 DIAGNOSIS — F41.1 GAD (GENERALIZED ANXIETY DISORDER): ICD-10-CM

## 2024-06-24 DIAGNOSIS — F43.10 PTSD (POST-TRAUMATIC STRESS DISORDER): Primary | ICD-10-CM

## 2024-06-24 DIAGNOSIS — F39 UNSPECIFIED MOOD (AFFECTIVE) DISORDER (HCC): ICD-10-CM

## 2024-06-24 DIAGNOSIS — F60.3 BORDERLINE PERSONALITY DISORDER (HCC): ICD-10-CM

## 2024-06-24 PROCEDURE — 90792 PSYCH DIAG EVAL W/MED SRVCS: CPT | Performed by: PSYCHIATRY & NEUROLOGY

## 2024-06-24 RX ORDER — FLUOXETINE HYDROCHLORIDE 20 MG/1
20 CAPSULE ORAL DAILY
Qty: 30 CAPSULE | Refills: 2 | Status: SHIPPED | OUTPATIENT
Start: 2024-06-24

## 2024-06-24 RX ORDER — CLONIDINE HYDROCHLORIDE 0.1 MG/1
0.1 TABLET ORAL 2 TIMES DAILY
Qty: 40 TABLET | Refills: 0 | Status: SHIPPED | OUTPATIENT
Start: 2024-06-24 | End: 2024-07-14

## 2024-06-24 ASSESSMENT — ANXIETY QUESTIONNAIRES
4. TROUBLE RELAXING: SEVERAL DAYS
5. BEING SO RESTLESS THAT IT IS HARD TO SIT STILL: MORE THAN HALF THE DAYS
1. FEELING NERVOUS, ANXIOUS, OR ON EDGE: MORE THAN HALF THE DAYS
7. FEELING AFRAID AS IF SOMETHING AWFUL MIGHT HAPPEN: NOT AT ALL
7. FEELING AFRAID AS IF SOMETHING AWFUL MIGHT HAPPEN: NOT AT ALL
GAD7 TOTAL SCORE: 10
1. FEELING NERVOUS, ANXIOUS, OR ON EDGE: MORE THAN HALF THE DAYS
5. BEING SO RESTLESS THAT IT IS HARD TO SIT STILL: MORE THAN HALF THE DAYS
4. TROUBLE RELAXING: SEVERAL DAYS
IF YOU CHECKED OFF ANY PROBLEMS ON THIS QUESTIONNAIRE, HOW DIFFICULT HAVE THESE PROBLEMS MADE IT FOR YOU TO DO YOUR WORK, TAKE CARE OF THINGS AT HOME, OR GET ALONG WITH OTHER PEOPLE: SOMEWHAT DIFFICULT
6. BECOMING EASILY ANNOYED OR IRRITABLE: SEVERAL DAYS
3. WORRYING TOO MUCH ABOUT DIFFERENT THINGS: MORE THAN HALF THE DAYS
2. NOT BEING ABLE TO STOP OR CONTROL WORRYING: MORE THAN HALF THE DAYS
IF YOU CHECKED OFF ANY PROBLEMS ON THIS QUESTIONNAIRE, HOW DIFFICULT HAVE THESE PROBLEMS MADE IT FOR YOU TO DO YOUR WORK, TAKE CARE OF THINGS AT HOME, OR GET ALONG WITH OTHER PEOPLE: SOMEWHAT DIFFICULT
2. NOT BEING ABLE TO STOP OR CONTROL WORRYING: MORE THAN HALF THE DAYS
6. BECOMING EASILY ANNOYED OR IRRITABLE: SEVERAL DAYS
3. WORRYING TOO MUCH ABOUT DIFFERENT THINGS: MORE THAN HALF THE DAYS

## 2024-06-24 ASSESSMENT — PATIENT HEALTH QUESTIONNAIRE - PHQ9
1. LITTLE INTEREST OR PLEASURE IN DOING THINGS: NOT AT ALL
7. TROUBLE CONCENTRATING ON THINGS, SUCH AS READING THE NEWSPAPER OR WATCHING TELEVISION: NOT AT ALL
7. TROUBLE CONCENTRATING ON THINGS, SUCH AS READING THE NEWSPAPER OR WATCHING TELEVISION: NOT AT ALL
9. THOUGHTS THAT YOU WOULD BE BETTER OFF DEAD, OR OF HURTING YOURSELF: NOT AT ALL
SUM OF ALL RESPONSES TO PHQ QUESTIONS 1-9: 11
SUM OF ALL RESPONSES TO PHQ QUESTIONS 1-9: 11
SUM OF ALL RESPONSES TO PHQ QUESTIONS 1-9: 8
2. FEELING DOWN, DEPRESSED OR HOPELESS: MORE THAN HALF THE DAYS
5. POOR APPETITE OR OVEREATING: NEARLY EVERY DAY
SUM OF ALL RESPONSES TO PHQ9 QUESTIONS 1 & 2: 2
1. LITTLE INTEREST OR PLEASURE IN DOING THINGS: NOT AT ALL
6. FEELING BAD ABOUT YOURSELF - OR THAT YOU ARE A FAILURE OR HAVE LET YOURSELF OR YOUR FAMILY DOWN: SEVERAL DAYS
9. THOUGHTS THAT YOU WOULD BE BETTER OFF DEAD, OR OF HURTING YOURSELF: NOT AT ALL
5. POOR APPETITE OR OVEREATING: NEARLY EVERY DAY
8. MOVING OR SPEAKING SO SLOWLY THAT OTHER PEOPLE COULD HAVE NOTICED. OR THE OPPOSITE, BEING SO FIGETY OR RESTLESS THAT YOU HAVE BEEN MOVING AROUND A LOT MORE THAN USUAL: NOT AT ALL
SUM OF ALL RESPONSES TO PHQ QUESTIONS 1-9: 8
2. FEELING DOWN, DEPRESSED OR HOPELESS: SEVERAL DAYS
7. TROUBLE CONCENTRATING ON THINGS, SUCH AS READING THE NEWSPAPER OR WATCHING TELEVISION: NOT AT ALL
6. FEELING BAD ABOUT YOURSELF - OR THAT YOU ARE A FAILURE OR HAVE LET YOURSELF OR YOUR FAMILY DOWN: MORE THAN HALF THE DAYS
10. IF YOU CHECKED OFF ANY PROBLEMS, HOW DIFFICULT HAVE THESE PROBLEMS MADE IT FOR YOU TO DO YOUR WORK, TAKE CARE OF THINGS AT HOME, OR GET ALONG WITH OTHER PEOPLE: NOT DIFFICULT AT ALL
SUM OF ALL RESPONSES TO PHQ QUESTIONS 1-9: 11
9. THOUGHTS THAT YOU WOULD BE BETTER OFF DEAD, OR OF HURTING YOURSELF: NOT AT ALL
10. IF YOU CHECKED OFF ANY PROBLEMS, HOW DIFFICULT HAVE THESE PROBLEMS MADE IT FOR YOU TO DO YOUR WORK, TAKE CARE OF THINGS AT HOME, OR GET ALONG WITH OTHER PEOPLE: SOMEWHAT DIFFICULT
8. MOVING OR SPEAKING SO SLOWLY THAT OTHER PEOPLE COULD HAVE NOTICED. OR THE OPPOSITE - BEING SO FIDGETY OR RESTLESS THAT YOU HAVE BEEN MOVING AROUND A LOT MORE THAN USUAL: NOT AT ALL
SUM OF ALL RESPONSES TO PHQ QUESTIONS 1-9: 8
3. TROUBLE FALLING OR STAYING ASLEEP: NOT AT ALL
1. LITTLE INTEREST OR PLEASURE IN DOING THINGS: NOT AT ALL
SUM OF ALL RESPONSES TO PHQ QUESTIONS 1-9: 8
8. MOVING OR SPEAKING SO SLOWLY THAT OTHER PEOPLE COULD HAVE NOTICED. OR THE OPPOSITE, BEING SO FIGETY OR RESTLESS THAT YOU HAVE BEEN MOVING AROUND A LOT MORE THAN USUAL: SEVERAL DAYS
SUM OF ALL RESPONSES TO PHQ9 QUESTIONS 1 & 2: 1
5. POOR APPETITE OR OVEREATING: NEARLY EVERY DAY
SUM OF ALL RESPONSES TO PHQ QUESTIONS 1-9: 8
3. TROUBLE FALLING OR STAYING ASLEEP: NOT AT ALL
3. TROUBLE FALLING OR STAYING ASLEEP: NOT AT ALL
10. IF YOU CHECKED OFF ANY PROBLEMS, HOW DIFFICULT HAVE THESE PROBLEMS MADE IT FOR YOU TO DO YOUR WORK, TAKE CARE OF THINGS AT HOME, OR GET ALONG WITH OTHER PEOPLE: NOT DIFFICULT AT ALL
6. FEELING BAD ABOUT YOURSELF - OR THAT YOU ARE A FAILURE OR HAVE LET YOURSELF OR YOUR FAMILY DOWN: SEVERAL DAYS
SUM OF ALL RESPONSES TO PHQ QUESTIONS 1-9: 11
4. FEELING TIRED OR HAVING LITTLE ENERGY: NEARLY EVERY DAY
2. FEELING DOWN, DEPRESSED OR HOPELESS: SEVERAL DAYS
4. FEELING TIRED OR HAVING LITTLE ENERGY: NEARLY EVERY DAY
4. FEELING TIRED OR HAVING LITTLE ENERGY: NEARLY EVERY DAY

## 2024-06-24 ASSESSMENT — ENCOUNTER SYMPTOMS
EYES NEGATIVE: 1
ALLERGIC/IMMUNOLOGIC NEGATIVE: 1
GASTROINTESTINAL NEGATIVE: 1
RESPIRATORY NEGATIVE: 1
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
## 2024-07-03 DIAGNOSIS — F41.1 GAD (GENERALIZED ANXIETY DISORDER): ICD-10-CM

## 2024-07-03 DIAGNOSIS — F60.3 BORDERLINE PERSONALITY DISORDER (HCC): ICD-10-CM

## 2024-07-03 DIAGNOSIS — F43.12 CHRONIC POST-TRAUMATIC STRESS DISORDER (PTSD): ICD-10-CM

## 2024-07-03 RX ORDER — HYDROXYZINE 50 MG/1
50-100 TABLET, FILM COATED ORAL NIGHTLY PRN
Qty: 60 TABLET | Refills: 0 | Status: SHIPPED | OUTPATIENT
Start: 2024-07-03

## 2024-07-03 NOTE — TELEPHONE ENCOUNTER
Patient called in requesting a refill of the following medication:    Patient stated she has enough for a week.    Hydroxyzine 50mg  #60 with no refills to the [facility] on [address]. Previous prescription was sent on 06/04/24 for #60 with no refills.    Last visit 06/24/24  Next visit 07/12/24    Pending your approval

## 2024-07-05 ENCOUNTER — TELEPHONE (OUTPATIENT)
Dept: PSYCHIATRY | Age: 34
End: 2024-07-05

## 2024-07-08 NOTE — TELEPHONE ENCOUNTER
Please stop clonidine and see if BP improves. If you continue to have symptoms I want you to get evaluated by PCP.    Dr. Cespedes

## 2024-07-11 ASSESSMENT — PATIENT HEALTH QUESTIONNAIRE - PHQ9
3. TROUBLE FALLING OR STAYING ASLEEP: MORE THAN HALF THE DAYS
9. THOUGHTS THAT YOU WOULD BE BETTER OFF DEAD, OR OF HURTING YOURSELF: NOT AT ALL
2. FEELING DOWN, DEPRESSED OR HOPELESS: SEVERAL DAYS
2. FEELING DOWN, DEPRESSED OR HOPELESS: SEVERAL DAYS
4. FEELING TIRED OR HAVING LITTLE ENERGY: NEARLY EVERY DAY
SUM OF ALL RESPONSES TO PHQ9 QUESTIONS 1 & 2: 1
SUM OF ALL RESPONSES TO PHQ QUESTIONS 1-9: 10
1. LITTLE INTEREST OR PLEASURE IN DOING THINGS: NOT AT ALL
10. IF YOU CHECKED OFF ANY PROBLEMS, HOW DIFFICULT HAVE THESE PROBLEMS MADE IT FOR YOU TO DO YOUR WORK, TAKE CARE OF THINGS AT HOME, OR GET ALONG WITH OTHER PEOPLE: SOMEWHAT DIFFICULT
SUM OF ALL RESPONSES TO PHQ QUESTIONS 1-9: 10
10. IF YOU CHECKED OFF ANY PROBLEMS, HOW DIFFICULT HAVE THESE PROBLEMS MADE IT FOR YOU TO DO YOUR WORK, TAKE CARE OF THINGS AT HOME, OR GET ALONG WITH OTHER PEOPLE: SOMEWHAT DIFFICULT
7. TROUBLE CONCENTRATING ON THINGS, SUCH AS READING THE NEWSPAPER OR WATCHING TELEVISION: SEVERAL DAYS
6. FEELING BAD ABOUT YOURSELF - OR THAT YOU ARE A FAILURE OR HAVE LET YOURSELF OR YOUR FAMILY DOWN: NOT AT ALL
SUM OF ALL RESPONSES TO PHQ QUESTIONS 1-9: 10
SUM OF ALL RESPONSES TO PHQ QUESTIONS 1-9: 10
9. THOUGHTS THAT YOU WOULD BE BETTER OFF DEAD, OR OF HURTING YOURSELF: NOT AT ALL
5. POOR APPETITE OR OVEREATING: NEARLY EVERY DAY
7. TROUBLE CONCENTRATING ON THINGS, SUCH AS READING THE NEWSPAPER OR WATCHING TELEVISION: SEVERAL DAYS
4. FEELING TIRED OR HAVING LITTLE ENERGY: NEARLY EVERY DAY
1. LITTLE INTEREST OR PLEASURE IN DOING THINGS: NOT AT ALL
5. POOR APPETITE OR OVEREATING: NEARLY EVERY DAY
8. MOVING OR SPEAKING SO SLOWLY THAT OTHER PEOPLE COULD HAVE NOTICED. OR THE OPPOSITE - BEING SO FIDGETY OR RESTLESS THAT YOU HAVE BEEN MOVING AROUND A LOT MORE THAN USUAL: NOT AT ALL
3. TROUBLE FALLING OR STAYING ASLEEP: MORE THAN HALF THE DAYS
6. FEELING BAD ABOUT YOURSELF - OR THAT YOU ARE A FAILURE OR HAVE LET YOURSELF OR YOUR FAMILY DOWN: NOT AT ALL
SUM OF ALL RESPONSES TO PHQ QUESTIONS 1-9: 10
8. MOVING OR SPEAKING SO SLOWLY THAT OTHER PEOPLE COULD HAVE NOTICED. OR THE OPPOSITE, BEING SO FIGETY OR RESTLESS THAT YOU HAVE BEEN MOVING AROUND A LOT MORE THAN USUAL: NOT AT ALL

## 2024-07-11 ASSESSMENT — ANXIETY QUESTIONNAIRES
6. BECOMING EASILY ANNOYED OR IRRITABLE: MORE THAN HALF THE DAYS
7. FEELING AFRAID AS IF SOMETHING AWFUL MIGHT HAPPEN: NOT AT ALL
6. BECOMING EASILY ANNOYED OR IRRITABLE: MORE THAN HALF THE DAYS
1. FEELING NERVOUS, ANXIOUS, OR ON EDGE: NEARLY EVERY DAY
IF YOU CHECKED OFF ANY PROBLEMS ON THIS QUESTIONNAIRE, HOW DIFFICULT HAVE THESE PROBLEMS MADE IT FOR YOU TO DO YOUR WORK, TAKE CARE OF THINGS AT HOME, OR GET ALONG WITH OTHER PEOPLE: SOMEWHAT DIFFICULT
3. WORRYING TOO MUCH ABOUT DIFFERENT THINGS: SEVERAL DAYS
7. FEELING AFRAID AS IF SOMETHING AWFUL MIGHT HAPPEN: NOT AT ALL
4. TROUBLE RELAXING: SEVERAL DAYS
4. TROUBLE RELAXING: SEVERAL DAYS
5. BEING SO RESTLESS THAT IT IS HARD TO SIT STILL: SEVERAL DAYS
GAD7 TOTAL SCORE: 9
5. BEING SO RESTLESS THAT IT IS HARD TO SIT STILL: SEVERAL DAYS
2. NOT BEING ABLE TO STOP OR CONTROL WORRYING: SEVERAL DAYS
3. WORRYING TOO MUCH ABOUT DIFFERENT THINGS: SEVERAL DAYS
2. NOT BEING ABLE TO STOP OR CONTROL WORRYING: SEVERAL DAYS
IF YOU CHECKED OFF ANY PROBLEMS ON THIS QUESTIONNAIRE, HOW DIFFICULT HAVE THESE PROBLEMS MADE IT FOR YOU TO DO YOUR WORK, TAKE CARE OF THINGS AT HOME, OR GET ALONG WITH OTHER PEOPLE: SOMEWHAT DIFFICULT
1. FEELING NERVOUS, ANXIOUS, OR ON EDGE: NEARLY EVERY DAY

## 2024-07-12 ENCOUNTER — TELEMEDICINE (OUTPATIENT)
Dept: PSYCHIATRY | Age: 34
End: 2024-07-12

## 2024-07-12 DIAGNOSIS — F43.12 CHRONIC POST-TRAUMATIC STRESS DISORDER (PTSD): ICD-10-CM

## 2024-07-12 DIAGNOSIS — F39 UNSPECIFIED MOOD (AFFECTIVE) DISORDER (HCC): ICD-10-CM

## 2024-07-12 DIAGNOSIS — F41.1 GAD (GENERALIZED ANXIETY DISORDER): Primary | ICD-10-CM

## 2024-07-12 DIAGNOSIS — F60.3 BORDERLINE PERSONALITY DISORDER (HCC): ICD-10-CM

## 2024-07-12 RX ORDER — FLUOXETINE HYDROCHLORIDE 40 MG/1
40 CAPSULE ORAL DAILY
Qty: 30 CAPSULE | Refills: 2 | Status: SHIPPED | OUTPATIENT
Start: 2024-07-12

## 2024-07-12 RX ORDER — HYDROXYZINE 50 MG/1
50-100 TABLET, FILM COATED ORAL NIGHTLY PRN
Qty: 60 TABLET | Refills: 2 | Status: SHIPPED | OUTPATIENT
Start: 2024-07-12

## 2024-07-12 RX ORDER — GABAPENTIN 100 MG/1
100 CAPSULE ORAL 3 TIMES DAILY
Qty: 90 CAPSULE | Refills: 1 | Status: SHIPPED | OUTPATIENT
Start: 2024-07-12 | End: 2024-09-10

## 2024-07-12 NOTE — PROGRESS NOTES
[facility] [place] [facility]  [facility] PSYCHIATRY  [address] [facility] [address] [facility] [address] [phone]    Progress Note    Patient:  Sabina Bocanegra  YOB: 1990  PCP:  Germaine Woodruff APRN - CNP  Visit Date:  7/12/2024    TELEHEALTH EVALUATION     CC: Follow up for medication management and psychotherapy      SUBJECTIVE:      Sabina Bocanegra, a 33 y.o. female, presents for a follow up visit.  Reports she did not tolerate clonidine due to hypotension.  Feels like anxiety remains elevated.  Did have some feelings of panic during work at times.  Continues to endorse symptoms consistent with PTSD.  Denies new stressors.  Feels like depression is state about the same.  Denies suicidal or homicidal ideation, intent or plan.  Appetite stable.    16 minutes spent in psychotherapy.  Supportive and brief CBT utilized.  Focus was on anxiety.    OBJECTIVE:      6/21/2024    10:04 AM   Patient-Reported Vitals   Patient-Reported Weight 162   Patient-Reported Height 5’6   Patient-Reported Systolic 122 mmHg   Patient-Reported Diastolic 80 mmHg   Patient-Reported Pulse 92   Patient-Reported Temperature 98.7          MENTAL STATUS EXAM:  Orientation: Alert, oriented, thought content appropriate   Mood:. \"Anxious\"      Affect:  Mood Congruent      Appearance:  Age Appropriate, Well Groomed, Clothing Appropriate for Age, and Clothing Appropriate for Weather   Thought Process:  Within Normal Limits   Thought Content:  Within Normal Limits   Insight:  Age Appropriate   Judgment: Age Appropriate   Suicidal Ideations: Denies Suicidal Ideation               7/11/2024    10:55 AM   PHQ-9    Little interest or pleasure in doing things 0   Feeling down, depressed, or hopeless 1   Trouble falling or staying asleep, or sleeping too much 2   Feeling tired or having little energy 3   Poor appetite or overeating 3   Feeling bad about yourself - or that you are a failure or have let yourself or

## 2024-07-22 ENCOUNTER — TELEPHONE (OUTPATIENT)
Dept: PSYCHIATRY | Age: 34
End: 2024-07-22

## 2024-07-22 NOTE — TELEPHONE ENCOUNTER
Please inform patient that we do not write letters for emotional support animals nor do evaluations for service dogs as this is outside the scope of my practice.     Dr. Cespedes

## 2024-07-22 NOTE — TELEPHONE ENCOUNTER
Sabina wrote into the office via [employer]:    I am looking into getting a second dog. I’d really like to get a service dog. Or have one trained. A psychiatric service dog. Would that be okay? I’ve been reading up on it. Since Frieda isn’t my dog, I want to have my own dog. I would need something from you stating I would benefit from one. I believe I would. Especially with how bad my anxiety gets in general public when I’m not working.     Please advise.

## 2024-08-16 ENCOUNTER — TELEMEDICINE (OUTPATIENT)
Dept: PSYCHIATRY | Age: 34
End: 2024-08-16
Payer: COMMERCIAL

## 2024-08-16 DIAGNOSIS — F40.10 SOCIAL ANXIETY DISORDER: ICD-10-CM

## 2024-08-16 DIAGNOSIS — F39 UNSPECIFIED MOOD (AFFECTIVE) DISORDER (HCC): ICD-10-CM

## 2024-08-16 DIAGNOSIS — F60.3 BORDERLINE PERSONALITY DISORDER (HCC): ICD-10-CM

## 2024-08-16 DIAGNOSIS — F43.10 PTSD (POST-TRAUMATIC STRESS DISORDER): Primary | ICD-10-CM

## 2024-08-16 DIAGNOSIS — F41.1 GAD (GENERALIZED ANXIETY DISORDER): ICD-10-CM

## 2024-08-16 PROCEDURE — 90833 PSYTX W PT W E/M 30 MIN: CPT | Performed by: PSYCHIATRY & NEUROLOGY

## 2024-08-16 PROCEDURE — 99214 OFFICE O/P EST MOD 30 MIN: CPT | Performed by: PSYCHIATRY & NEUROLOGY

## 2024-08-16 RX ORDER — GABAPENTIN 300 MG/1
300 CAPSULE ORAL 3 TIMES DAILY
Qty: 90 CAPSULE | Refills: 1 | Status: SHIPPED | OUTPATIENT
Start: 2024-08-16 | End: 2024-10-15

## 2024-08-16 RX ORDER — PRAZOSIN HYDROCHLORIDE 1 MG/1
1 CAPSULE ORAL NIGHTLY
Qty: 30 CAPSULE | Refills: 2 | Status: SHIPPED | OUTPATIENT
Start: 2024-08-16

## 2024-08-16 ASSESSMENT — PATIENT HEALTH QUESTIONNAIRE - PHQ9
SUM OF ALL RESPONSES TO PHQ QUESTIONS 1-9: 11
7. TROUBLE CONCENTRATING ON THINGS, SUCH AS READING THE NEWSPAPER OR WATCHING TELEVISION: NOT AT ALL
2. FEELING DOWN, DEPRESSED OR HOPELESS: SEVERAL DAYS
6. FEELING BAD ABOUT YOURSELF - OR THAT YOU ARE A FAILURE OR HAVE LET YOURSELF OR YOUR FAMILY DOWN: SEVERAL DAYS
SUM OF ALL RESPONSES TO PHQ QUESTIONS 1-9: 11
10. IF YOU CHECKED OFF ANY PROBLEMS, HOW DIFFICULT HAVE THESE PROBLEMS MADE IT FOR YOU TO DO YOUR WORK, TAKE CARE OF THINGS AT HOME, OR GET ALONG WITH OTHER PEOPLE: SOMEWHAT DIFFICULT
4. FEELING TIRED OR HAVING LITTLE ENERGY: NEARLY EVERY DAY
SUM OF ALL RESPONSES TO PHQ QUESTIONS 1-9: 11
SUM OF ALL RESPONSES TO PHQ9 QUESTIONS 1 & 2: 2
2. FEELING DOWN, DEPRESSED OR HOPELESS: SEVERAL DAYS
5. POOR APPETITE OR OVEREATING: NEARLY EVERY DAY
10. IF YOU CHECKED OFF ANY PROBLEMS, HOW DIFFICULT HAVE THESE PROBLEMS MADE IT FOR YOU TO DO YOUR WORK, TAKE CARE OF THINGS AT HOME, OR GET ALONG WITH OTHER PEOPLE: SOMEWHAT DIFFICULT
8. MOVING OR SPEAKING SO SLOWLY THAT OTHER PEOPLE COULD HAVE NOTICED. OR THE OPPOSITE, BEING SO FIGETY OR RESTLESS THAT YOU HAVE BEEN MOVING AROUND A LOT MORE THAN USUAL: NOT AT ALL
3. TROUBLE FALLING OR STAYING ASLEEP: MORE THAN HALF THE DAYS
9. THOUGHTS THAT YOU WOULD BE BETTER OFF DEAD, OR OF HURTING YOURSELF: NOT AT ALL
6. FEELING BAD ABOUT YOURSELF - OR THAT YOU ARE A FAILURE OR HAVE LET YOURSELF OR YOUR FAMILY DOWN: SEVERAL DAYS
1. LITTLE INTEREST OR PLEASURE IN DOING THINGS: SEVERAL DAYS
5. POOR APPETITE OR OVEREATING: NEARLY EVERY DAY
SUM OF ALL RESPONSES TO PHQ QUESTIONS 1-9: 11
1. LITTLE INTEREST OR PLEASURE IN DOING THINGS: SEVERAL DAYS
9. THOUGHTS THAT YOU WOULD BE BETTER OFF DEAD, OR OF HURTING YOURSELF: NOT AT ALL
3. TROUBLE FALLING OR STAYING ASLEEP: MORE THAN HALF THE DAYS
7. TROUBLE CONCENTRATING ON THINGS, SUCH AS READING THE NEWSPAPER OR WATCHING TELEVISION: NOT AT ALL
4. FEELING TIRED OR HAVING LITTLE ENERGY: NEARLY EVERY DAY
8. MOVING OR SPEAKING SO SLOWLY THAT OTHER PEOPLE COULD HAVE NOTICED. OR THE OPPOSITE - BEING SO FIDGETY OR RESTLESS THAT YOU HAVE BEEN MOVING AROUND A LOT MORE THAN USUAL: NOT AT ALL
SUM OF ALL RESPONSES TO PHQ QUESTIONS 1-9: 11

## 2024-08-16 ASSESSMENT — ANXIETY QUESTIONNAIRES
1. FEELING NERVOUS, ANXIOUS, OR ON EDGE: MORE THAN HALF THE DAYS
4. TROUBLE RELAXING: NEARLY EVERY DAY
3. WORRYING TOO MUCH ABOUT DIFFERENT THINGS: SEVERAL DAYS
IF YOU CHECKED OFF ANY PROBLEMS ON THIS QUESTIONNAIRE, HOW DIFFICULT HAVE THESE PROBLEMS MADE IT FOR YOU TO DO YOUR WORK, TAKE CARE OF THINGS AT HOME, OR GET ALONG WITH OTHER PEOPLE: SOMEWHAT DIFFICULT
2. NOT BEING ABLE TO STOP OR CONTROL WORRYING: MORE THAN HALF THE DAYS
1. FEELING NERVOUS, ANXIOUS, OR ON EDGE: MORE THAN HALF THE DAYS
6. BECOMING EASILY ANNOYED OR IRRITABLE: MORE THAN HALF THE DAYS
5. BEING SO RESTLESS THAT IT IS HARD TO SIT STILL: NEARLY EVERY DAY
2. NOT BEING ABLE TO STOP OR CONTROL WORRYING: MORE THAN HALF THE DAYS
4. TROUBLE RELAXING: NEARLY EVERY DAY
7. FEELING AFRAID AS IF SOMETHING AWFUL MIGHT HAPPEN: NOT AT ALL
IF YOU CHECKED OFF ANY PROBLEMS ON THIS QUESTIONNAIRE, HOW DIFFICULT HAVE THESE PROBLEMS MADE IT FOR YOU TO DO YOUR WORK, TAKE CARE OF THINGS AT HOME, OR GET ALONG WITH OTHER PEOPLE: SOMEWHAT DIFFICULT
7. FEELING AFRAID AS IF SOMETHING AWFUL MIGHT HAPPEN: NOT AT ALL
3. WORRYING TOO MUCH ABOUT DIFFERENT THINGS: SEVERAL DAYS
GAD7 TOTAL SCORE: 13
6. BECOMING EASILY ANNOYED OR IRRITABLE: MORE THAN HALF THE DAYS
5. BEING SO RESTLESS THAT IT IS HARD TO SIT STILL: NEARLY EVERY DAY

## 2024-08-16 NOTE — PROGRESS NOTES
[facility] [place] [facility]  [facility] PSYCHIATRY  [address] [facility] [id number] [phone]    Progress Note    Patient:  Sabina Bocanegra  YOB: 1990  PCP:  Germaine Woodruff APRN - CNP  Visit Date:  8/16/2024    TELEHEALTH EVALUATION     CC: Follow up for medication management and psychotherapy      SUBJECTIVE:      Sabina Bocanegra, a 33 y.o. female, presents for a follow up visit.  Reports she tolerated initiation of gabapentin.  Feels like it has been somewhat helpful.  Over the last few weeks has noticed an increase in nightmares and what sounds like sympathetic symptoms in the context of sleep.  Reports her significant other reports she is tossing and turning in her sleep.  Could be related to increased work stress.  Does report feeling more on edge throughout the day at times.  Sleep impacted by above symptoms.  Still struggling a lot with appetite and food selection.  Reports this has been a longstanding issue.  Reports only certain foods sound good and she has some food aversion which we processed through today.  Reports she has continued to lose weight secondary to this.  She denies suicidal or homicidal ideation, intent or plan.    16 minutes was spent in psychotherapy.  Supportive and CBT utilized.  Focus was on anxiety and stressors.    OBJECTIVE:      8/15/2024    10:50 AM   Patient-Reported Vitals   Patient-Reported Weight 165   Patient-Reported Height 5”6   Patient-Reported Systolic 126 mmHg   Patient-Reported Diastolic 83 mmHg   Patient-Reported Temperature 99.3   Patient-Reported SpO2 98          MENTAL STATUS EXAM:  Orientation: Alert, oriented, thought content appropriate   Mood:. \"Okay\"      Affect:  Anxious      Appearance:  Age Appropriate, Well Groomed, Clothing Appropriate for Age, and Clothing Appropriate for Weather   Thought Process:  Within Normal Limits   Thought Content:  Within Normal Limits   Insight:  Age Appropriate

## 2024-08-19 NOTE — PATIENT INSTRUCTIONS
Plan:  Sabina Bocanegra, it was nice seeing you today  Start prazosin 1 mg nightly.  Update me early next week on how you are doing with it.  Monitor closely for side effects including but not limited to lightheadedness or dizziness  Increase gabapentin to 300 mg 3 times daily.  Monitor for side effects  Continue Prozac and hydroxyzine at current dosing  Follow up in 4 weeks but reach out sooner if needed      -Please take medications as prescribed  -Refrain from alcohol or drug use  -Seek emergency help via the emergency and/or calling 911 should symptoms become severe, worsen, or with other concerning symptoms.Go immediately to the emergency room and/or call 911 with any suicidal or homicidal ideations or if audio/visual hallucinations develop.   -Contact office with any questions or concerns.      Crisis phone numbers:  988-national suicide hotline, call or text  [facility] [place], [facility] [phone].  [facility] [phone]  [facility] [phone].  [facility] [phone].  [facility] [phone].  [facility] [phone].

## 2024-09-03 ENCOUNTER — HOSPITAL ENCOUNTER (EMERGENCY)
Age: 34
Discharge: HOME | End: 2024-09-03
Payer: COMMERCIAL

## 2024-09-03 VITALS
SYSTOLIC BLOOD PRESSURE: 139 MMHG | OXYGEN SATURATION: 98 % | DIASTOLIC BLOOD PRESSURE: 84 MMHG | TEMPERATURE: 97.6 F | RESPIRATION RATE: 20 BRPM | HEART RATE: 78 BPM

## 2024-09-03 DIAGNOSIS — B97.89 ACUTE VIRAL SINUSITIS: ICD-10-CM

## 2024-09-03 DIAGNOSIS — J01.90 ACUTE VIRAL SINUSITIS: ICD-10-CM

## 2024-09-03 DIAGNOSIS — H66.91 RIGHT OTITIS MEDIA, UNSPECIFIED OTITIS MEDIA TYPE: ICD-10-CM

## 2024-09-03 DIAGNOSIS — J02.9 ACUTE PHARYNGITIS, UNSPECIFIED ETIOLOGY: Primary | ICD-10-CM

## 2024-09-03 LAB
S PYO AG THROAT QL: NEGATIVE
SARS-COV-2 RDRP RESP QL NAA+PROBE: NOT  DETECTED

## 2024-09-03 PROCEDURE — 99213 OFFICE O/P EST LOW 20 MIN: CPT | Performed by: NURSE PRACTITIONER

## 2024-09-03 PROCEDURE — 87651 STREP A DNA AMP PROBE: CPT

## 2024-09-03 PROCEDURE — 87635 SARS-COV-2 COVID-19 AMP PRB: CPT

## 2024-09-03 PROCEDURE — 99213 OFFICE O/P EST LOW 20 MIN: CPT

## 2024-09-03 RX ORDER — AZITHROMYCIN 250 MG/1
TABLET, FILM COATED ORAL
Qty: 1 PACKET | Refills: 0 | Status: SHIPPED | OUTPATIENT
Start: 2024-09-03 | End: 2024-09-07

## 2024-09-03 RX ORDER — PSEUDOEPHEDRINE HCL 120 MG/1
120 TABLET, FILM COATED, EXTENDED RELEASE ORAL EVERY 12 HOURS
Qty: 14 TABLET | Refills: 0 | Status: SHIPPED | OUTPATIENT
Start: 2024-09-03 | End: 2024-09-10

## 2024-09-03 RX ORDER — DIPHENHYDRAMINE HCL 25 MG
25 CAPSULE ORAL NIGHTLY PRN
Qty: 30 CAPSULE | Refills: 0 | Status: SHIPPED | OUTPATIENT
Start: 2024-09-03 | End: 2024-09-13

## 2024-09-03 ASSESSMENT — ENCOUNTER SYMPTOMS
STRIDOR: 0
SINUS PAIN: 1
SORE THROAT: 1
CHEST TIGHTNESS: 0
SWOLLEN GLANDS: 0
SINUS PRESSURE: 1
COUGH: 1
WHEEZING: 0
APNEA: 0
CHOKING: 0
SHORTNESS OF BREATH: 0
RHINORRHEA: 1

## 2024-09-03 NOTE — DISCHARGE INSTRUCTIONS
Drink lots of fluids  Take Motrin or Tylenol for headache  Humidification of air  Use nasal spray as directed  Take a nasal decongestant as directed  According to the Journal of Family Practice, “Daily hypertonic saline nasal irrigation improves quality of life, decreases symptoms and decreases medication use in patients with frequent sinusitis.”  The [facility] identified mold and fungi as leading causes of sinusitis. And Alkalol’s blend of naturally antiseptic ingredients has been shown in independent laboratory tests to clear out bacteria and inhibit the growth mold and fungi. Alkalol’s special mix of salts are hypertonic, resulting in greater pressure to clear nasal passages. This type of saline is recommended in many recent medical studies.  Monitor for any fever increased and sinus pain or pressure  Follow-up see her primary care provider in 48 hours  Or go to the emergency department for any changes or concerns.

## 2024-09-03 NOTE — ED PROVIDER NOTES
[facility] URGENT CARE  Urgent Care Encounter      CHIEF COMPLAINT       Chief Complaint   Patient presents with    Cough    Pharyngitis    Otalgia       Nurses Notes reviewed and I agree except as noted in the HPI.  HISTORY OFPRESENT ILLNESS   Sabina Bocanegra is a 33 y.o.  The history is provided by the patient. No  was used.   URI  Presenting symptoms: congestion, cough, ear pain, fatigue, rhinorrhea and sore throat    Presenting symptoms: no facial pain and no fever    Severity:  Severe  Onset quality:  Gradual  Duration:  4 days  Timing:  Constant  Progression:  Worsening  Chronicity:  New  Relieved by:  Nothing  Worsened by:  Movement and certain positions  Ineffective treatments:  OTC medications  Associated symptoms: headaches and sinus pain    Associated symptoms: no arthralgias, no myalgias, no neck pain, no sneezing, no swollen glands and no wheezing    Risk factors: not elderly, no chronic cardiac disease, no chronic kidney disease, no chronic respiratory disease, no diabetes mellitus, no immunosuppression, no recent illness, no recent travel and no sick contacts        REVIEW OF SYSTEMS     Review of Systems   Constitutional:  Positive for activity change, appetite change and fatigue. Negative for chills, diaphoresis and fever.   HENT:  Positive for congestion, ear pain, postnasal drip, rhinorrhea, sinus pressure, sinus pain and sore throat. Negative for sneezing.    Respiratory:  Positive for cough. Negative for apnea, choking, chest tightness, shortness of breath, wheezing and stridor.    Cardiovascular:  Negative for chest pain, palpitations and leg swelling.   Musculoskeletal:  Negative for arthralgias, myalgias and neck pain.   Neurological:  Positive for headaches.       PAST MEDICAL HISTORY         Diagnosis Date    Anxiety     Depression     Drug abuse (HCC)        SURGICAL HISTORY     Patient  has a past surgical history that includes Hysterectomy and

## 2024-09-15 SDOH — ECONOMIC STABILITY: TRANSPORTATION INSECURITY
IN THE PAST 12 MONTHS, HAS LACK OF TRANSPORTATION KEPT YOU FROM MEETINGS, WORK, OR FROM GETTING THINGS NEEDED FOR DAILY LIVING?: NO

## 2024-09-15 SDOH — ECONOMIC STABILITY: FOOD INSECURITY: WITHIN THE PAST 12 MONTHS, YOU WORRIED THAT YOUR FOOD WOULD RUN OUT BEFORE YOU GOT MONEY TO BUY MORE.: NEVER TRUE

## 2024-09-15 SDOH — ECONOMIC STABILITY: FOOD INSECURITY: WITHIN THE PAST 12 MONTHS, THE FOOD YOU BOUGHT JUST DIDN'T LAST AND YOU DIDN'T HAVE MONEY TO GET MORE.: NEVER TRUE

## 2024-09-15 SDOH — ECONOMIC STABILITY: INCOME INSECURITY: HOW HARD IS IT FOR YOU TO PAY FOR THE VERY BASICS LIKE FOOD, HOUSING, MEDICAL CARE, AND HEATING?: NOT HARD AT ALL

## 2024-09-18 ENCOUNTER — HOSPITAL ENCOUNTER (OUTPATIENT)
Age: 34
Discharge: HOME | End: 2024-09-18
Payer: COMMERCIAL

## 2024-09-18 ENCOUNTER — OFFICE VISIT (OUTPATIENT)
Dept: FAMILY MEDICINE CLINIC | Age: 34
End: 2024-09-18
Payer: COMMERCIAL

## 2024-09-18 ENCOUNTER — PATIENT MESSAGE (OUTPATIENT)
Dept: FAMILY MEDICINE CLINIC | Age: 34
End: 2024-09-18

## 2024-09-18 VITALS
RESPIRATION RATE: 12 BRPM | SYSTOLIC BLOOD PRESSURE: 120 MMHG | HEART RATE: 76 BPM | WEIGHT: 157.4 LBS | BODY MASS INDEX: 25.41 KG/M2 | TEMPERATURE: 98.2 F | DIASTOLIC BLOOD PRESSURE: 78 MMHG

## 2024-09-18 DIAGNOSIS — R53.83 FATIGUE, UNSPECIFIED TYPE: ICD-10-CM

## 2024-09-18 DIAGNOSIS — Z86.2 HISTORY OF ANEMIA: ICD-10-CM

## 2024-09-18 DIAGNOSIS — R00.2 PALPITATIONS: ICD-10-CM

## 2024-09-18 DIAGNOSIS — R00.1 BRADYCARDIA: ICD-10-CM

## 2024-09-18 DIAGNOSIS — R63.1 INCREASED THIRST: ICD-10-CM

## 2024-09-18 DIAGNOSIS — E55.9 VITAMIN D DEFICIENCY: ICD-10-CM

## 2024-09-18 DIAGNOSIS — R55 SYNCOPE, UNSPECIFIED SYNCOPE TYPE: ICD-10-CM

## 2024-09-18 DIAGNOSIS — R63.0 LOSS OF APPETITE FOR MORE THAN 2 WEEKS: ICD-10-CM

## 2024-09-18 DIAGNOSIS — F41.1 GAD (GENERALIZED ANXIETY DISORDER): ICD-10-CM

## 2024-09-18 DIAGNOSIS — Z83.3 FAMILY HISTORY OF DIABETES MELLITUS: ICD-10-CM

## 2024-09-18 DIAGNOSIS — R55 SYNCOPE, UNSPECIFIED SYNCOPE TYPE: Primary | ICD-10-CM

## 2024-09-18 LAB
25(OH)D3 SERPL-MCNC: 20 NG/ML (ref 30–100)
ALBUMIN SERPL BCG-MCNC: 4.5 G/DL (ref 3.5–5.1)
ALP SERPL-CCNC: 153 U/L (ref 38–126)
ALT SERPL W/O P-5'-P-CCNC: 29 U/L (ref 11–66)
ANION GAP SERPL CALC-SCNC: 16 MEQ/L (ref 8–16)
AST SERPL-CCNC: 29 U/L (ref 5–40)
BASOPHILS ABSOLUTE: 0 THOU/MM3 (ref 0–0.1)
BASOPHILS NFR BLD AUTO: 0.4 %
BILIRUB SERPL-MCNC: 0.5 MG/DL (ref 0.3–1.2)
BUN SERPL-MCNC: 13 MG/DL (ref 7–22)
CALCIUM SERPL-MCNC: 9.1 MG/DL (ref 8.5–10.5)
CHLORIDE SERPL-SCNC: 100 MEQ/L (ref 98–111)
CO2 SERPL-SCNC: 23 MEQ/L (ref 23–33)
CREAT SERPL-MCNC: 0.8 MG/DL (ref 0.4–1.2)
DEPRECATED MEAN GLUCOSE BLD GHB EST-ACNC: 96 MG/DL (ref 70–126)
DEPRECATED RDW RBC AUTO: 42.5 FL (ref 35–45)
EOSINOPHIL NFR BLD AUTO: 0.9 %
EOSINOPHILS ABSOLUTE: 0.1 THOU/MM3 (ref 0–0.4)
ERYTHROCYTE [DISTWIDTH] IN BLOOD BY AUTOMATED COUNT: 12.7 % (ref 11.5–14.5)
FERRITIN SERPL IA-MCNC: 152 NG/ML (ref 10–291)
FOLATE SERPL-MCNC: 10.3 NG/ML (ref 4.8–24.2)
GFR SERPL CREATININE-BSD FRML MDRD: > 90 ML/MIN/1.73M2
GLUCOSE SERPL-MCNC: 82 MG/DL (ref 70–108)
HBA1C MFR BLD HPLC: 5.2 % (ref 4.4–6.4)
HCT VFR BLD AUTO: 39.6 % (ref 37–47)
HGB BLD-MCNC: 13.5 GM/DL (ref 12–16)
IMM GRANULOCYTES # BLD AUTO: 0.02 THOU/MM3 (ref 0–0.07)
IMM GRANULOCYTES NFR BLD AUTO: 0.3 %
IRON SERPL-MCNC: 111 UG/DL (ref 50–170)
LYMPHOCYTES ABSOLUTE: 1.4 THOU/MM3 (ref 1–4.8)
LYMPHOCYTES NFR BLD AUTO: 20 %
MAGNESIUM SERPL-MCNC: 2 MG/DL (ref 1.6–2.4)
MCH RBC QN AUTO: 31.3 PG (ref 26–33)
MCHC RBC AUTO-ENTMCNC: 34.1 GM/DL (ref 32.2–35.5)
MCV RBC AUTO: 91.7 FL (ref 81–99)
MONOCYTES ABSOLUTE: 0.4 THOU/MM3 (ref 0.4–1.3)
MONOCYTES NFR BLD AUTO: 6 %
NEUTROPHILS ABSOLUTE: 5.1 THOU/MM3 (ref 1.8–7.7)
NEUTROPHILS NFR BLD AUTO: 72.4 %
NRBC BLD AUTO-RTO: 0 /100 WBC
PLATELET # BLD AUTO: 287 THOU/MM3 (ref 130–400)
PMV BLD AUTO: 10 FL (ref 9.4–12.4)
POTASSIUM SERPL-SCNC: 4.1 MEQ/L (ref 3.5–5.2)
PROT SERPL-MCNC: 7.8 G/DL (ref 6.1–8)
RBC # BLD AUTO: 4.32 MILL/MM3 (ref 4.2–5.4)
SODIUM SERPL-SCNC: 139 MEQ/L (ref 135–145)
T4 FREE SERPL-MCNC: 1.09 NG/DL (ref 0.93–1.68)
TIBC SERPL-MCNC: 247 UG/DL (ref 171–450)
TSH SERPL DL<=0.005 MIU/L-ACNC: 0.55 UIU/ML (ref 0.4–4.2)
VIT B12 SERPL-MCNC: 400 PG/ML (ref 211–911)
WBC # BLD AUTO: 7 THOU/MM3 (ref 4.8–10.8)

## 2024-09-18 PROCEDURE — 99214 OFFICE O/P EST MOD 30 MIN: CPT | Performed by: NURSE PRACTITIONER

## 2024-09-18 PROCEDURE — 82607 VITAMIN B-12: CPT

## 2024-09-18 PROCEDURE — 85025 COMPLETE CBC W/AUTO DIFF WBC: CPT

## 2024-09-18 PROCEDURE — 82728 ASSAY OF FERRITIN: CPT

## 2024-09-18 PROCEDURE — 84443 ASSAY THYROID STIM HORMONE: CPT

## 2024-09-18 PROCEDURE — 83540 ASSAY OF IRON: CPT

## 2024-09-18 PROCEDURE — 82306 VITAMIN D 25 HYDROXY: CPT

## 2024-09-18 PROCEDURE — 82746 ASSAY OF FOLIC ACID SERUM: CPT

## 2024-09-18 PROCEDURE — 83735 ASSAY OF MAGNESIUM: CPT

## 2024-09-18 PROCEDURE — 84439 ASSAY OF FREE THYROXINE: CPT

## 2024-09-18 PROCEDURE — 83550 IRON BINDING TEST: CPT

## 2024-09-18 PROCEDURE — 83036 HEMOGLOBIN GLYCOSYLATED A1C: CPT

## 2024-09-18 PROCEDURE — 80053 COMPREHEN METABOLIC PANEL: CPT

## 2024-09-18 PROCEDURE — 36415 COLL VENOUS BLD VENIPUNCTURE: CPT

## 2024-09-19 ENCOUNTER — TELEPHONE (OUTPATIENT)
Dept: FAMILY MEDICINE CLINIC | Age: 34
End: 2024-09-19

## 2024-09-19 ENCOUNTER — PATIENT MESSAGE (OUTPATIENT)
Dept: FAMILY MEDICINE CLINIC | Age: 34
End: 2024-09-19

## 2024-09-19 DIAGNOSIS — E55.9 VITAMIN D DEFICIENCY: ICD-10-CM

## 2024-09-19 DIAGNOSIS — R74.8 ELEVATED ALKALINE PHOSPHATASE LEVEL: Primary | ICD-10-CM

## 2024-09-19 NOTE — TELEPHONE ENCOUNTER
----- Message from FELIZ Garcia CNP sent at 9/19/2024  9:12 AM EDT -----  Vitamin D is low and alkaline phosphatase is elevated. Labs otherwise look good. Would recommend taking vitamin D3 5,000 units daily and rechecking vitamin D level, HFP, and alk phosph isoenzymes in 3 months. TS

## 2024-09-20 ENCOUNTER — APPOINTMENT (OUTPATIENT)
Age: 34
End: 2024-09-20
Attending: EMERGENCY MEDICINE
Payer: COMMERCIAL

## 2024-09-20 ENCOUNTER — APPOINTMENT (OUTPATIENT)
Dept: GENERAL RADIOLOGY | Age: 34
End: 2024-09-20
Payer: COMMERCIAL

## 2024-09-20 ENCOUNTER — APPOINTMENT (OUTPATIENT)
Dept: MRI IMAGING | Age: 34
End: 2024-09-20
Payer: COMMERCIAL

## 2024-09-20 ENCOUNTER — APPOINTMENT (OUTPATIENT)
Dept: CT IMAGING | Age: 34
End: 2024-09-20
Payer: COMMERCIAL

## 2024-09-20 ENCOUNTER — HOSPITAL ENCOUNTER (EMERGENCY)
Age: 34
Discharge: HOME | End: 2024-09-20
Attending: EMERGENCY MEDICINE
Payer: COMMERCIAL

## 2024-09-20 VITALS
HEART RATE: 69 BPM | DIASTOLIC BLOOD PRESSURE: 83 MMHG | OXYGEN SATURATION: 98 % | RESPIRATION RATE: 18 BRPM | TEMPERATURE: 98.2 F | WEIGHT: 152 LBS | HEIGHT: 66 IN | BODY MASS INDEX: 24.43 KG/M2 | SYSTOLIC BLOOD PRESSURE: 115 MMHG

## 2024-09-20 DIAGNOSIS — R55 SYNCOPE, UNSPECIFIED SYNCOPE TYPE: ICD-10-CM

## 2024-09-20 DIAGNOSIS — R00.2 PALPITATIONS: ICD-10-CM

## 2024-09-20 DIAGNOSIS — R20.0 LEFT SIDED NUMBNESS: ICD-10-CM

## 2024-09-20 DIAGNOSIS — R55 SYNCOPE AND COLLAPSE: Primary | ICD-10-CM

## 2024-09-20 LAB
ALBUMIN SERPL BCG-MCNC: 4.2 G/DL (ref 3.5–5.1)
ALP SERPL-CCNC: 150 U/L (ref 38–126)
ALT SERPL W/O P-5'-P-CCNC: 30 U/L (ref 11–66)
ANION GAP SERPL CALC-SCNC: 11 MEQ/L (ref 8–16)
APTT PPP: 33.7 SECONDS (ref 22–38)
AST SERPL-CCNC: 25 U/L (ref 5–40)
BASOPHILS ABSOLUTE: 0 THOU/MM3 (ref 0–0.1)
BASOPHILS NFR BLD AUTO: 0.3 %
BILIRUB SERPL-MCNC: 0.4 MG/DL (ref 0.3–1.2)
BUN SERPL-MCNC: 16 MG/DL (ref 7–22)
CALCIUM SERPL-MCNC: 9 MG/DL (ref 8.5–10.5)
CHLORIDE SERPL-SCNC: 100 MEQ/L (ref 98–111)
CO2 SERPL-SCNC: 27 MEQ/L (ref 23–33)
CREAT SERPL-MCNC: 0.8 MG/DL (ref 0.4–1.2)
D DIMER PPP IA.FEU-MCNC: 250 NG/ML FEU (ref 0–500)
DEPRECATED RDW RBC AUTO: 42.3 FL (ref 35–45)
ECHO BSA: 1.79 M2
EOSINOPHIL NFR BLD AUTO: 1 %
EOSINOPHILS ABSOLUTE: 0.1 THOU/MM3 (ref 0–0.4)
ERYTHROCYTE [DISTWIDTH] IN BLOOD BY AUTOMATED COUNT: 12.7 % (ref 11.5–14.5)
GFR SERPL CREATININE-BSD FRML MDRD: > 90 ML/MIN/1.73M2
GLUCOSE SERPL-MCNC: 95 MG/DL (ref 70–108)
HCT VFR BLD AUTO: 35.8 % (ref 37–47)
HGB BLD-MCNC: 12.3 GM/DL (ref 12–16)
IMM GRANULOCYTES # BLD AUTO: 0.01 THOU/MM3 (ref 0–0.07)
IMM GRANULOCYTES NFR BLD AUTO: 0.2 %
INR PPP: 1.04 (ref 0.85–1.13)
LYMPHOCYTES ABSOLUTE: 1.4 THOU/MM3 (ref 1–4.8)
LYMPHOCYTES NFR BLD AUTO: 23.1 %
MCH RBC QN AUTO: 31.3 PG (ref 26–33)
MCHC RBC AUTO-ENTMCNC: 34.4 GM/DL (ref 32.2–35.5)
MCV RBC AUTO: 91.1 FL (ref 81–99)
MONOCYTES ABSOLUTE: 0.6 THOU/MM3 (ref 0.4–1.3)
MONOCYTES NFR BLD AUTO: 9.2 %
NEUTROPHILS ABSOLUTE: 4 THOU/MM3 (ref 1.8–7.7)
NEUTROPHILS NFR BLD AUTO: 66.2 %
NRBC BLD AUTO-RTO: 0 /100 WBC
OSMOLALITY SERPL CALC.SUM OF ELEC: 276.7 MOSMOL/KG (ref 275–300)
PLATELET # BLD AUTO: 256 THOU/MM3 (ref 130–400)
PMV BLD AUTO: 9.8 FL (ref 9.4–12.4)
POTASSIUM SERPL-SCNC: 3.8 MEQ/L (ref 3.5–5.2)
PROT SERPL-MCNC: 7.3 G/DL (ref 6.1–8)
RBC # BLD AUTO: 3.93 MILL/MM3 (ref 4.2–5.4)
SODIUM SERPL-SCNC: 138 MEQ/L (ref 135–145)
TROPONIN, HIGH SENSITIVITY: < 6 NG/L (ref 0–12)
WBC # BLD AUTO: 6.1 THOU/MM3 (ref 4.8–10.8)

## 2024-09-20 PROCEDURE — 36415 COLL VENOUS BLD VENIPUNCTURE: CPT

## 2024-09-20 PROCEDURE — 85730 THROMBOPLASTIN TIME PARTIAL: CPT

## 2024-09-20 PROCEDURE — 99215 OFFICE O/P EST HI 40 MIN: CPT | Performed by: RADIOLOGY

## 2024-09-20 PROCEDURE — 99285 EMERGENCY DEPT VISIT HI MDM: CPT

## 2024-09-20 PROCEDURE — 71045 X-RAY EXAM CHEST 1 VIEW: CPT

## 2024-09-20 PROCEDURE — 85610 PROTHROMBIN TIME: CPT

## 2024-09-20 PROCEDURE — 84484 ASSAY OF TROPONIN QUANT: CPT

## 2024-09-20 PROCEDURE — 85379 FIBRIN DEGRADATION QUANT: CPT

## 2024-09-20 PROCEDURE — 70450 CT HEAD/BRAIN W/O DYE: CPT

## 2024-09-20 PROCEDURE — 70496 CT ANGIOGRAPHY HEAD: CPT

## 2024-09-20 PROCEDURE — 6360000004 HC RX CONTRAST MEDICATION: Performed by: EMERGENCY MEDICINE

## 2024-09-20 PROCEDURE — 85025 COMPLETE CBC W/AUTO DIFF WBC: CPT

## 2024-09-20 PROCEDURE — 80053 COMPREHEN METABOLIC PANEL: CPT

## 2024-09-20 PROCEDURE — 93005 ELECTROCARDIOGRAM TRACING: CPT | Performed by: EMERGENCY MEDICINE

## 2024-09-20 PROCEDURE — 70498 CT ANGIOGRAPHY NECK: CPT

## 2024-09-20 PROCEDURE — 70551 MRI BRAIN STEM W/O DYE: CPT

## 2024-09-20 PROCEDURE — 93246 EXT ECG>7D<15D RECORDING: CPT

## 2024-09-20 RX ORDER — IOPAMIDOL 755 MG/ML
80 INJECTION, SOLUTION INTRAVASCULAR
Status: COMPLETED | OUTPATIENT
Start: 2024-09-20 | End: 2024-09-20

## 2024-09-20 RX ADMIN — IOPAMIDOL 80 ML: 755 INJECTION, SOLUTION INTRAVENOUS at 13:42

## 2024-09-20 ASSESSMENT — PAIN - FUNCTIONAL ASSESSMENT: PAIN_FUNCTIONAL_ASSESSMENT: 0-10

## 2024-09-20 ASSESSMENT — ENCOUNTER SYMPTOMS: DIPLOPIA: BILATERAL

## 2024-09-20 ASSESSMENT — PAIN SCALES - GENERAL: PAINLEVEL_OUTOF10: 10

## 2024-09-21 ENCOUNTER — PATIENT MESSAGE (OUTPATIENT)
Dept: FAMILY MEDICINE CLINIC | Age: 34
End: 2024-09-21

## 2024-09-21 LAB
EKG ATRIAL RATE: 62 BPM
EKG P AXIS: 20 DEGREES
EKG P-R INTERVAL: 180 MS
EKG Q-T INTERVAL: 434 MS
EKG QRS DURATION: 92 MS
EKG QTC CALCULATION (BAZETT): 440 MS
EKG R AXIS: 31 DEGREES
EKG T AXIS: 60 DEGREES
EKG VENTRICULAR RATE: 62 BPM

## 2024-09-21 PROCEDURE — 93010 ELECTROCARDIOGRAM REPORT: CPT | Performed by: INTERNAL MEDICINE

## 2024-09-25 ENCOUNTER — TELEMEDICINE (OUTPATIENT)
Dept: PSYCHIATRY | Age: 34
End: 2024-09-25

## 2024-09-25 ENCOUNTER — OFFICE VISIT (OUTPATIENT)
Dept: FAMILY MEDICINE CLINIC | Age: 34
End: 2024-09-25
Payer: COMMERCIAL

## 2024-09-25 VITALS
HEART RATE: 72 BPM | TEMPERATURE: 98.3 F | HEIGHT: 66 IN | DIASTOLIC BLOOD PRESSURE: 86 MMHG | RESPIRATION RATE: 16 BRPM | WEIGHT: 156.8 LBS | SYSTOLIC BLOOD PRESSURE: 118 MMHG | BODY MASS INDEX: 25.2 KG/M2

## 2024-09-25 DIAGNOSIS — F41.1 GAD (GENERALIZED ANXIETY DISORDER): ICD-10-CM

## 2024-09-25 DIAGNOSIS — F39 UNSPECIFIED MOOD (AFFECTIVE) DISORDER (HCC): ICD-10-CM

## 2024-09-25 DIAGNOSIS — F43.10 PTSD (POST-TRAUMATIC STRESS DISORDER): Primary | ICD-10-CM

## 2024-09-25 DIAGNOSIS — R55 SYNCOPE, UNSPECIFIED SYNCOPE TYPE: Primary | ICD-10-CM

## 2024-09-25 DIAGNOSIS — F40.10 SOCIAL ANXIETY DISORDER: ICD-10-CM

## 2024-09-25 DIAGNOSIS — F60.3 BORDERLINE PERSONALITY DISORDER (HCC): ICD-10-CM

## 2024-09-25 PROCEDURE — 99214 OFFICE O/P EST MOD 30 MIN: CPT | Performed by: NURSE PRACTITIONER

## 2024-09-25 RX ORDER — GABAPENTIN 300 MG/1
300 CAPSULE ORAL 3 TIMES DAILY
Qty: 90 CAPSULE | Refills: 2 | Status: SHIPPED | OUTPATIENT
Start: 2024-10-12 | End: 2025-01-10

## 2024-09-25 RX ORDER — FLUOXETINE 40 MG/1
40 CAPSULE ORAL DAILY
Qty: 90 CAPSULE | Refills: 1 | Status: SHIPPED | OUTPATIENT
Start: 2024-09-25

## 2024-09-25 ASSESSMENT — ANXIETY QUESTIONNAIRES
4. TROUBLE RELAXING: NEARLY EVERY DAY
7. FEELING AFRAID AS IF SOMETHING AWFUL MIGHT HAPPEN: SEVERAL DAYS
4. TROUBLE RELAXING: NEARLY EVERY DAY
IF YOU CHECKED OFF ANY PROBLEMS ON THIS QUESTIONNAIRE, HOW DIFFICULT HAVE THESE PROBLEMS MADE IT FOR YOU TO DO YOUR WORK, TAKE CARE OF THINGS AT HOME, OR GET ALONG WITH OTHER PEOPLE: EXTREMELY DIFFICULT
1. FEELING NERVOUS, ANXIOUS, OR ON EDGE: NEARLY EVERY DAY
6. BECOMING EASILY ANNOYED OR IRRITABLE: NEARLY EVERY DAY
5. BEING SO RESTLESS THAT IT IS HARD TO SIT STILL: NEARLY EVERY DAY
5. BEING SO RESTLESS THAT IT IS HARD TO SIT STILL: NEARLY EVERY DAY
6. BECOMING EASILY ANNOYED OR IRRITABLE: NEARLY EVERY DAY
IF YOU CHECKED OFF ANY PROBLEMS ON THIS QUESTIONNAIRE, HOW DIFFICULT HAVE THESE PROBLEMS MADE IT FOR YOU TO DO YOUR WORK, TAKE CARE OF THINGS AT HOME, OR GET ALONG WITH OTHER PEOPLE: EXTREMELY DIFFICULT
2. NOT BEING ABLE TO STOP OR CONTROL WORRYING: SEVERAL DAYS
1. FEELING NERVOUS, ANXIOUS, OR ON EDGE: NEARLY EVERY DAY
7. FEELING AFRAID AS IF SOMETHING AWFUL MIGHT HAPPEN: SEVERAL DAYS
2. NOT BEING ABLE TO STOP OR CONTROL WORRYING: SEVERAL DAYS

## 2024-09-25 ASSESSMENT — PATIENT HEALTH QUESTIONNAIRE - PHQ9
1. LITTLE INTEREST OR PLEASURE IN DOING THINGS: SEVERAL DAYS
1. LITTLE INTEREST OR PLEASURE IN DOING THINGS: SEVERAL DAYS
SUM OF ALL RESPONSES TO PHQ9 QUESTIONS 1 & 2: 3
3. TROUBLE FALLING OR STAYING ASLEEP: NEARLY EVERY DAY
5. POOR APPETITE OR OVEREATING: NEARLY EVERY DAY
10. IF YOU CHECKED OFF ANY PROBLEMS, HOW DIFFICULT HAVE THESE PROBLEMS MADE IT FOR YOU TO DO YOUR WORK, TAKE CARE OF THINGS AT HOME, OR GET ALONG WITH OTHER PEOPLE: EXTREMELY DIFFICULT
8. MOVING OR SPEAKING SO SLOWLY THAT OTHER PEOPLE COULD HAVE NOTICED. OR THE OPPOSITE - BEING SO FIDGETY OR RESTLESS THAT YOU HAVE BEEN MOVING AROUND A LOT MORE THAN USUAL: MORE THAN HALF THE DAYS
10. IF YOU CHECKED OFF ANY PROBLEMS, HOW DIFFICULT HAVE THESE PROBLEMS MADE IT FOR YOU TO DO YOUR WORK, TAKE CARE OF THINGS AT HOME, OR GET ALONG WITH OTHER PEOPLE: EXTREMELY DIFFICULT
7. TROUBLE CONCENTRATING ON THINGS, SUCH AS READING THE NEWSPAPER OR WATCHING TELEVISION: NOT AT ALL
2. FEELING DOWN, DEPRESSED OR HOPELESS: MORE THAN HALF THE DAYS
SUM OF ALL RESPONSES TO PHQ QUESTIONS 1-9: 17
6. FEELING BAD ABOUT YOURSELF - OR THAT YOU ARE A FAILURE OR HAVE LET YOURSELF OR YOUR FAMILY DOWN: NEARLY EVERY DAY
SUM OF ALL RESPONSES TO PHQ QUESTIONS 1-9: 17
4. FEELING TIRED OR HAVING LITTLE ENERGY: NEARLY EVERY DAY
9. THOUGHTS THAT YOU WOULD BE BETTER OFF DEAD, OR OF HURTING YOURSELF: NOT AT ALL
6. FEELING BAD ABOUT YOURSELF - OR THAT YOU ARE A FAILURE OR HAVE LET YOURSELF OR YOUR FAMILY DOWN: NEARLY EVERY DAY
SUM OF ALL RESPONSES TO PHQ QUESTIONS 1-9: 17
8. MOVING OR SPEAKING SO SLOWLY THAT OTHER PEOPLE COULD HAVE NOTICED. OR THE OPPOSITE, BEING SO FIGETY OR RESTLESS THAT YOU HAVE BEEN MOVING AROUND A LOT MORE THAN USUAL: MORE THAN HALF THE DAYS
9. THOUGHTS THAT YOU WOULD BE BETTER OFF DEAD, OR OF HURTING YOURSELF: NOT AT ALL
2. FEELING DOWN, DEPRESSED OR HOPELESS: MORE THAN HALF THE DAYS
7. TROUBLE CONCENTRATING ON THINGS, SUCH AS READING THE NEWSPAPER OR WATCHING TELEVISION: NOT AT ALL
SUM OF ALL RESPONSES TO PHQ QUESTIONS 1-9: 17
SUM OF ALL RESPONSES TO PHQ QUESTIONS 1-9: 17
5. POOR APPETITE OR OVEREATING: NEARLY EVERY DAY
4. FEELING TIRED OR HAVING LITTLE ENERGY: NEARLY EVERY DAY
3. TROUBLE FALLING OR STAYING ASLEEP: NEARLY EVERY DAY

## 2024-09-25 NOTE — PROGRESS NOTES
Chief Complaint   Patient presents with    Follow-up     Pt here for ed f/u         SUBJECTIVE     Sabina Bocanegra is a 33 y.o.female      Passed out while at work and woke up in the emergency room. States she lost about 2 hours of time. Evaluation in the ED was negative but patient states they told her they believe she had a Silent stroke.  Saw her psychiatrist today and he does not want her driving until she has a workup completed.     Significant other is here today with patient. States that she is nodding off while sitting. SO said this happened several times while sitting at a soccer game on Saturday. Pt does arouse easily but does not realize when this is happening.    Already has a referral with Dr Mc. No current cardiology referral. Pt would like to wait until the workup comes back. Currently has a holter on until 9/30/24.     14 years clean from pills. Rare vape. Infrequent alcohol. Eats a lot of red meat and tuna, salmon. Likes pasta. Several things upset her stomach.  Had ulcerative colitis in high school.    Has stelo CGM on. BS ranges from around . High of 219. But does not give exact numbers.     Review of Systems   Constitutional:  Positive for fatigue. Negative for chills, diaphoresis and fever.   Respiratory:  Negative for shortness of breath.    Cardiovascular:  Negative for chest pain, palpitations and leg swelling.   Gastrointestinal:  Positive for abdominal pain. Negative for blood in stool, constipation, diarrhea, nausea and vomiting.   Genitourinary:  Negative for dysuria and hematuria.   Musculoskeletal:  Negative for myalgias.   Neurological:  Positive for dizziness, syncope and weakness. Negative for headaches.   Psychiatric/Behavioral:  The patient is nervous/anxious.    All other systems reviewed and are negative.        OBJECTIVE     /86 (Site: Left Upper Arm, Position: Sitting)   Pulse 72   Temp 98.3 °F (36.8 °C)   Resp 16   Ht 1.676 m (5' 6\")   Wt 71.1 kg (156

## 2024-09-26 ENCOUNTER — TELEPHONE (OUTPATIENT)
Dept: FAMILY MEDICINE CLINIC | Age: 34
End: 2024-09-26

## 2024-09-26 NOTE — TELEPHONE ENCOUNTER
Let patient know that her current monitor can only stay on 14 days. If needed, we can order another one that can be on longer but she may have 2 charges for the two different monitors. If she would like to proceed, I am okay with ordering the other monitor for an additional 2 weeks. TS

## 2024-09-26 NOTE — TELEPHONE ENCOUNTER
Pt wants to do the 14 days with the monitor she already has on. I called Dot back, she stated she will call the company and get it extended to 14 days.

## 2024-09-26 NOTE — TELEPHONE ENCOUNTER
Spoke with Dot with [facility] Holter monitoring, she stated the type of monitor pt has on isn't live so it can only be on for 14 days. She said you can order a MCOT monitor and that one can be on for 30 days.

## 2024-09-29 ENCOUNTER — APPOINTMENT (OUTPATIENT)
Dept: GENERAL RADIOLOGY | Age: 34
End: 2024-09-29
Payer: COMMERCIAL

## 2024-09-29 ENCOUNTER — HOSPITAL ENCOUNTER (EMERGENCY)
Age: 34
Discharge: HOME | End: 2024-09-29
Attending: EMERGENCY MEDICINE
Payer: COMMERCIAL

## 2024-09-29 VITALS
RESPIRATION RATE: 17 BRPM | OXYGEN SATURATION: 99 % | TEMPERATURE: 97.9 F | DIASTOLIC BLOOD PRESSURE: 83 MMHG | SYSTOLIC BLOOD PRESSURE: 112 MMHG | HEART RATE: 55 BPM

## 2024-09-29 DIAGNOSIS — R55 PRE-SYNCOPE: Primary | ICD-10-CM

## 2024-09-29 LAB
ALBUMIN SERPL BCG-MCNC: 4.2 G/DL (ref 3.5–5.1)
ALP SERPL-CCNC: 168 U/L (ref 38–126)
ALT SERPL W/O P-5'-P-CCNC: 26 U/L (ref 11–66)
ANION GAP SERPL CALC-SCNC: 10 MEQ/L (ref 8–16)
AST SERPL-CCNC: 26 U/L (ref 5–40)
BASOPHILS ABSOLUTE: 0 THOU/MM3 (ref 0–0.1)
BASOPHILS NFR BLD AUTO: 0.5 %
BILIRUB CONJ SERPL-MCNC: < 0.1 MG/DL (ref 0.1–13.8)
BILIRUB SERPL-MCNC: 0.2 MG/DL (ref 0.3–1.2)
BILIRUB UR QL STRIP.AUTO: NEGATIVE
BUN SERPL-MCNC: 9 MG/DL (ref 7–22)
CALCIUM SERPL-MCNC: 8.9 MG/DL (ref 8.5–10.5)
CHARACTER UR: CLEAR
CHLORIDE SERPL-SCNC: 104 MEQ/L (ref 98–111)
CO2 SERPL-SCNC: 24 MEQ/L (ref 23–33)
COLOR, UA: YELLOW
CREAT SERPL-MCNC: 0.7 MG/DL (ref 0.4–1.2)
DEPRECATED RDW RBC AUTO: 42.1 FL (ref 35–45)
EOSINOPHIL NFR BLD AUTO: 1.9 %
EOSINOPHILS ABSOLUTE: 0.1 THOU/MM3 (ref 0–0.4)
ERYTHROCYTE [DISTWIDTH] IN BLOOD BY AUTOMATED COUNT: 12.7 % (ref 11.5–14.5)
GFR SERPL CREATININE-BSD FRML MDRD: > 90 ML/MIN/1.73M2
GLUCOSE SERPL-MCNC: 95 MG/DL (ref 70–108)
GLUCOSE UR QL STRIP.AUTO: NEGATIVE MG/DL
HCT VFR BLD AUTO: 35.6 % (ref 37–47)
HGB BLD-MCNC: 12.2 GM/DL (ref 12–16)
HGB UR QL STRIP.AUTO: NEGATIVE
IMM GRANULOCYTES # BLD AUTO: 0.01 THOU/MM3 (ref 0–0.07)
IMM GRANULOCYTES NFR BLD AUTO: 0.2 %
KETONES UR QL STRIP.AUTO: NEGATIVE
LIPASE SERPL-CCNC: 23.2 U/L (ref 5.6–51.3)
LYMPHOCYTES ABSOLUTE: 2 THOU/MM3 (ref 1–4.8)
LYMPHOCYTES NFR BLD AUTO: 34.8 %
MAGNESIUM SERPL-MCNC: 2 MG/DL (ref 1.6–2.4)
MCH RBC QN AUTO: 31.2 PG (ref 26–33)
MCHC RBC AUTO-ENTMCNC: 34.3 GM/DL (ref 32.2–35.5)
MCV RBC AUTO: 91 FL (ref 81–99)
MONOCYTES ABSOLUTE: 0.5 THOU/MM3 (ref 0.4–1.3)
MONOCYTES NFR BLD AUTO: 9.4 %
NEUTROPHILS ABSOLUTE: 3.1 THOU/MM3 (ref 1.8–7.7)
NEUTROPHILS NFR BLD AUTO: 53.2 %
NITRITE UR QL STRIP: NEGATIVE
NRBC BLD AUTO-RTO: 0 /100 WBC
NT-PROBNP SERPL IA-MCNC: < 36 PG/ML (ref 0–124)
OSMOLALITY SERPL CALC.SUM OF ELEC: 274.2 MOSMOL/KG (ref 275–300)
PH UR STRIP.AUTO: 7 [PH] (ref 5–9)
PLATELET # BLD AUTO: 285 THOU/MM3 (ref 130–400)
PMV BLD AUTO: 9.6 FL (ref 9.4–12.4)
POTASSIUM SERPL-SCNC: 3.8 MEQ/L (ref 3.5–5.2)
PROT SERPL-MCNC: 7 G/DL (ref 6.1–8)
PROT UR STRIP.AUTO-MCNC: NEGATIVE MG/DL
RBC # BLD AUTO: 3.91 MILL/MM3 (ref 4.2–5.4)
SODIUM SERPL-SCNC: 138 MEQ/L (ref 135–145)
SP GR UR REFRACT.AUTO: 1.01 (ref 1–1.03)
TROPONIN, HIGH SENSITIVITY: < 6 NG/L (ref 0–12)
TROPONIN, HIGH SENSITIVITY: < 6 NG/L (ref 0–12)
UROBILINOGEN, URINE: 0.2 EU/DL (ref 0–1)
WBC # BLD AUTO: 5.8 THOU/MM3 (ref 4.8–10.8)
WBC #/AREA URNS HPF: NEGATIVE /[HPF]

## 2024-09-29 PROCEDURE — 85025 COMPLETE CBC W/AUTO DIFF WBC: CPT

## 2024-09-29 PROCEDURE — 84484 ASSAY OF TROPONIN QUANT: CPT

## 2024-09-29 PROCEDURE — 83880 ASSAY OF NATRIURETIC PEPTIDE: CPT

## 2024-09-29 PROCEDURE — 81003 URINALYSIS AUTO W/O SCOPE: CPT

## 2024-09-29 PROCEDURE — 99285 EMERGENCY DEPT VISIT HI MDM: CPT

## 2024-09-29 PROCEDURE — 83690 ASSAY OF LIPASE: CPT

## 2024-09-29 PROCEDURE — 80076 HEPATIC FUNCTION PANEL: CPT

## 2024-09-29 PROCEDURE — 83735 ASSAY OF MAGNESIUM: CPT

## 2024-09-29 PROCEDURE — 80048 BASIC METABOLIC PNL TOTAL CA: CPT

## 2024-09-29 PROCEDURE — 71045 X-RAY EXAM CHEST 1 VIEW: CPT

## 2024-09-29 PROCEDURE — 93005 ELECTROCARDIOGRAM TRACING: CPT | Performed by: EMERGENCY MEDICINE

## 2024-09-29 PROCEDURE — 36415 COLL VENOUS BLD VENIPUNCTURE: CPT

## 2024-09-29 RX ORDER — HYDROXYZINE HYDROCHLORIDE 25 MG/1
50 TABLET, FILM COATED ORAL ONCE
Status: DISCONTINUED | OUTPATIENT
Start: 2024-09-29 | End: 2024-09-30 | Stop reason: HOSPADM

## 2024-09-29 ASSESSMENT — PAIN DESCRIPTION - LOCATION
LOCATION: CHEST
LOCATION: CHEST

## 2024-09-29 ASSESSMENT — PAIN DESCRIPTION - DESCRIPTORS
DESCRIPTORS: HEAVINESS
DESCRIPTORS: HEAVINESS

## 2024-09-29 ASSESSMENT — PAIN SCALES - GENERAL
PAINLEVEL_OUTOF10: 9
PAINLEVEL_OUTOF10: 9

## 2024-09-29 NOTE — ED TRIAGE NOTES
Patient presents to the Emergency Department via self. Patient presents with a complaint of passing out times 20 today.Pt also states chest heaviness that started about an hour ago pain rated about 9/10. Pain increasing during standing up. Patient states that she can feel them coming on and sits down. EKG obtained and IV inserted. Patient is alert and oriented x4, patient does not appear in acute distress upon triage. Patient respirations are regular and unlabored with even rise and fall of chest. Patient family at the bedside. Call light within reach. Pt states she is always dizzy and nauseous.

## 2024-09-30 ENCOUNTER — TELEPHONE (OUTPATIENT)
Dept: FAMILY MEDICINE CLINIC | Age: 34
End: 2024-09-30

## 2024-09-30 ENCOUNTER — TELEPHONE (OUTPATIENT)
Dept: PSYCHIATRY | Age: 34
End: 2024-09-30

## 2024-09-30 DIAGNOSIS — R00.2 PALPITATIONS: ICD-10-CM

## 2024-09-30 DIAGNOSIS — R55 SYNCOPE, UNSPECIFIED SYNCOPE TYPE: Primary | ICD-10-CM

## 2024-09-30 DIAGNOSIS — R00.1 BRADYCARDIA: ICD-10-CM

## 2024-09-30 LAB
EKG ATRIAL RATE: 59 BPM
EKG P AXIS: 43 DEGREES
EKG P-R INTERVAL: 178 MS
EKG Q-T INTERVAL: 444 MS
EKG QRS DURATION: 102 MS
EKG QTC CALCULATION (BAZETT): 439 MS
EKG R AXIS: 36 DEGREES
EKG T AXIS: 65 DEGREES
EKG VENTRICULAR RATE: 59 BPM

## 2024-09-30 PROCEDURE — 93010 ELECTROCARDIOGRAM REPORT: CPT | Performed by: NUCLEAR MEDICINE

## 2024-09-30 ASSESSMENT — ENCOUNTER SYMPTOMS
DIARRHEA: 0
BLOOD IN STOOL: 0
ABDOMINAL PAIN: 1
VOMITING: 0
CONSTIPATION: 0
SHORTNESS OF BREATH: 0
NAUSEA: 0

## 2024-09-30 NOTE — ED NOTES
Pt expressing anxiety. No acute distress noted. Pt while standing for ortho stats abdi legs and this RN held patient up by placing hand on shoulder. Pt head falls forward with eyes closed at this time. This RN tells patient to remain standing. Orthos complete at this time. Pt sits back in bed. Provider notified of findings.

## 2024-09-30 NOTE — TELEPHONE ENCOUNTER
She stated she reached out to Dr. Garay's office and they told her that they are reviewing her chart and will call her with an appt but she hasn't heard from them yet. Agreeable to seeing cardiology; please provide physician and diagnosis for referral. Pt agreeable to Rx for Zofran, using [facility]. WCP tomorrow.     Of note, I don't see a referral to [facility] Neurology placed in the pts chart. The only thing I see is an inpatient consult to neurology.

## 2024-09-30 NOTE — TELEPHONE ENCOUNTER
I am not sure what to offer to help until we know what is causing he symptoms. So far testing is not giving us the answers we need. I can get her some zofran for nausea if needed and I can set her up with cardiology if desired. Has neurology reached out to schedule her yet? TS

## 2024-09-30 NOTE — TELEPHONE ENCOUNTER
Sabina wrote into the office via [employer]:    Back in ER.    *See chart; she is scheduled to return on 11/13/24. Please advise anything further

## 2024-09-30 NOTE — TELEPHONE ENCOUNTER
Chart reviewed. Recommend she continue to follow up with PCP. As mentioned at last visit I want to keep medications where they are so we do not introduce any new variables.    Dr. Cespedes

## 2024-09-30 NOTE — ED PROVIDER NOTES
Years since quittin.8    Smokeless tobacco: Never    Tobacco comments:     Quit smoking    Vaping Use    Vaping status: Every Day    Substances: Nicotine, Flavoring    Devices: Pre-filled pod   Substance Use Topics    Alcohol use: Not Currently    Drug use: Not Currently     Types: Cocaine     Comment: [employer]         ALLERGIES     Allergies   Allergen Reactions    Carafate [Sucralfate]     Keflex [Cephalexin]     Morphine     Mushroom Extract Complex          FAMILY HISTORY     Family History   Problem Relation Age of Onset    Other Mother         Autoimmune disorders    Anemia Mother     Arthritis Mother     Asthma Mother     Cancer Mother         Uteren    Colon Cancer Mother         Early signs, caught it and had surgery. Hasnt come back    Depression Mother     Diabetes Mother         Type 2    Heart Attack Mother     High Blood Pressure Mother     High Cholesterol Mother     Immune Disorder Mother     Mental Illness Mother     Miscarriages / Stillbirths Mother     Obesity Mother     Stroke Mother     Vision Loss Mother     Alcohol Abuse Father     Arthritis Father     Hearing Loss Father     Obesity Father     Substance Abuse Father         Alcohol    Arthritis Maternal Grandfather     Diabetes Maternal Grandfather         Type 1    Arthritis Maternal Grandmother     Cancer Maternal Grandmother         Cervical    Depression Maternal Grandmother     Heart Attack Maternal Grandmother     High Blood Pressure Maternal Grandmother     Mental Illness Maternal Grandmother     Miscarriages / Stillbirths Maternal Grandmother     Obesity Maternal Grandmother     Osteoarthritis Maternal Grandmother     Stroke Maternal Grandmother     Vision Loss Maternal Grandmother     Arthritis Paternal Grandfather     Depression Paternal Grandfather     Gout Paternal Grandfather     High Blood Pressure Paternal Grandfather     High Cholesterol Paternal Grandfather     Mental Illness Paternal Grandfather     Obesity Paternal

## 2024-09-30 NOTE — TELEPHONE ENCOUNTER
LM on nurse EVE stating she was back in the ER last night. She is having a hard time staying awake and feels \"super heavy\". She feels woozy and has nausea and \"doesn't feel right\". She states nothing is helping her symptoms. Wants to know what TS recommends since the ER is unable to help her. Please advise.

## 2024-09-30 NOTE — DISCHARGE INSTRUCTIONS
You are seen in the ED today.  Your lab workup was reassuring.  Please follow-up with your primary care provider.  Contact the neurology office to discuss appointment times.    Call today or tomorrow to follow up with Jennyfer Butcher APRN - CNP within the week.    Get up slowly; dangle your feet over the bed before standing up, do not stand up quickly.    Return to the Emergency Department for blacking out, any headache, slurring of speech, loss of strength, excessive nausea or vomiting, any other care or concern.

## 2024-10-01 ENCOUNTER — PATIENT MESSAGE (OUTPATIENT)
Dept: FAMILY MEDICINE CLINIC | Age: 34
End: 2024-10-01

## 2024-10-01 RX ORDER — ONDANSETRON 4 MG/1
4 TABLET, FILM COATED ORAL 3 TIMES DAILY PRN
Qty: 30 TABLET | Refills: 0 | Status: SHIPPED | OUTPATIENT
Start: 2024-10-01

## 2024-10-01 NOTE — TELEPHONE ENCOUNTER
Spoke to pt and notified her of the referrals to neurology and cardiology. She will call to schedule her own appt with Dr. Jacques.     I contacted Dr. Garay's office and she stated that they received Jennyfer's referral today and her chart will be reviewed and they will contact the pt to schedule. Pt notified of this.

## 2024-10-02 ENCOUNTER — TELEPHONE (OUTPATIENT)
Dept: CARDIOLOGY CLINIC | Age: 34
End: 2024-10-02

## 2024-10-02 ENCOUNTER — APPOINTMENT (OUTPATIENT)
Age: 34
End: 2024-10-02
Payer: COMMERCIAL

## 2024-10-02 ENCOUNTER — OFFICE VISIT (OUTPATIENT)
Dept: CARDIOLOGY CLINIC | Age: 34
End: 2024-10-02
Payer: COMMERCIAL

## 2024-10-02 ENCOUNTER — HOSPITAL ENCOUNTER (OUTPATIENT)
Age: 34
Discharge: HOME | End: 2024-10-04
Payer: COMMERCIAL

## 2024-10-02 VITALS
DIASTOLIC BLOOD PRESSURE: 91 MMHG | HEART RATE: 72 BPM | BODY MASS INDEX: 25.39 KG/M2 | HEIGHT: 66 IN | SYSTOLIC BLOOD PRESSURE: 127 MMHG | WEIGHT: 158 LBS

## 2024-10-02 DIAGNOSIS — R55 SYNCOPE, UNSPECIFIED SYNCOPE TYPE: Primary | ICD-10-CM

## 2024-10-02 DIAGNOSIS — R55 SYNCOPE, UNSPECIFIED SYNCOPE TYPE: ICD-10-CM

## 2024-10-02 LAB
ECHO AO ASC DIAM: 2.7 CM
ECHO AV CUSP MM: 2 CM
ECHO AV PEAK GRADIENT: 5 MMHG
ECHO AV PEAK VELOCITY: 1.1 M/S
ECHO AV VELOCITY RATIO: 0.82
ECHO LA AREA 2C: 8.9 CM2
ECHO LA AREA 4C: 9.6 CM2
ECHO LA DIAMETER: 2.7 CM
ECHO LA MAJOR AXIS: 3.9 CM
ECHO LA MINOR AXIS: 3.7 CM
ECHO LA VOL BP: 19 ML (ref 22–52)
ECHO LA VOL MOD A2C: 17 ML (ref 22–52)
ECHO LA VOL MOD A4C: 19 ML (ref 22–52)
ECHO LV E' LATERAL VELOCITY: 12.6 CM/S
ECHO LV E' SEPTAL VELOCITY: 10.4 CM/S
ECHO LV EJECTION FRACTION BIPLANE: 56 % (ref 55–100)
ECHO LV FRACTIONAL SHORTENING: 30 % (ref 28–44)
ECHO LV INTERNAL DIMENSION DIASTOLIC: 4.4 CM (ref 3.9–5.3)
ECHO LV INTERNAL DIMENSION SYSTOLIC: 3.1 CM
ECHO LV ISOVOLUMETRIC RELAXATION TIME (IVRT): 81 MS
ECHO LV IVSD: 0.7 CM (ref 0.6–0.9)
ECHO LV MASS 2D: 92.1 G (ref 67–162)
ECHO LV POSTERIOR WALL DIASTOLIC: 0.7 CM (ref 0.6–0.9)
ECHO LV RELATIVE WALL THICKNESS RATIO: 0.32
ECHO LVOT PEAK GRADIENT: 3 MMHG
ECHO LVOT PEAK VELOCITY: 0.9 M/S
ECHO PV MAX VELOCITY: 0.6 M/S
ECHO PV PEAK GRADIENT: 2 MMHG
ECHO RV INTERNAL DIMENSION: 2.5 CM
ECHO RV TAPSE: 1.8 CM (ref 1.7–?)
ECHO TV E WAVE: 0.9 M/S
ECHO TV REGURGITANT MAX VELOCITY: 2.25 M/S
ECHO TV REGURGITANT PEAK GRADIENT: 20 MMHG
TILT CV INITIAL SUPINE HEART RATE: 69 BPM
TILT CV INITIAL SUPINE MAX BP: NORMAL BPM
TILT CV INITIAL TILT BLOOD PRESSURE: NORMAL MMHG
TILT CV INITIAL TILT HEART RATE: 75 BPM
TILT CV MAX BP BLOOD PRESSURE: NORMAL MMHG
TILT CV MAX BP MINUTES: 30
TILT CV MAX BP SECONDS: 0
TILT CV MAX HEART RATE: 85 BPM
TILT CV MAX HR MINUTES: 20
TILT CV MAX HR SECONDS: 0
TILT CV MINIMUM BP BLOOD PRESSURE: NORMAL MMHG
TILT CV MINIMUM BP MINUTES: 20
TILT CV MINIMUM BP SECONDS: 0
TILT CV MINIMUM HR HEART RATE: 68 BPM
TILT CV MINIMUM HR MINUTES: 18
TILT CV MINIMUM HR SECONDS: 0

## 2024-10-02 PROCEDURE — 93660 TILT TABLE EVALUATION: CPT

## 2024-10-02 PROCEDURE — 93306 TTE W/DOPPLER COMPLETE: CPT

## 2024-10-02 PROCEDURE — 99204 OFFICE O/P NEW MOD 45 MIN: CPT | Performed by: INTERNAL MEDICINE

## 2024-10-02 NOTE — PROGRESS NOTES
Est care, silent stroke  Had tilt and ECHO completed this morning.       Dizziness and syncopal episodes daily for about 14 years, has been getting worse the last couple weeks since 09/20/2024 Last anywhere between 40-90 seconds per her boyfriend.     Reports chest pain and SOB everyday. Reports palpitations when moving a lot or up on power equipment.           
Headache, Hearing loss, Irritable bowel syndrome, and Osteoarthritis.    Social History  Sabina  reports that she quit smoking about 22 months ago. Her smoking use included cigarettes. She started smoking about 21 years ago. She has a 19.3 pack-year smoking history. She has never used smokeless tobacco. She reports that she does not currently use alcohol. She reports that she does not currently use drugs after having used the following drugs: Cocaine.    Family History  Sabina family history includes Alcohol Abuse in her brother and father; Anemia in her mother; Arthritis in her father, maternal grandfather, maternal grandmother, mother, paternal grandfather, and paternal grandmother; Asthma in her brother, mother, and sister; Cancer in her maternal grandmother and mother; Colon Cancer in her mother; Depression in her brother, maternal aunt, maternal aunt, maternal grandmother, mother, paternal grandfather, and sister; Diabetes in her maternal cousin, maternal grandfather, mother, and paternal cousin; Gout in her paternal grandfather; Hearing Loss in her brother and father; Heart Attack in her maternal grandmother and mother; High Blood Pressure in her maternal grandmother, mother, and paternal grandfather; High Cholesterol in her mother and paternal grandfather; Immune Disorder in her mother; Mental Illness in her brother, maternal aunt, maternal grandmother, mother, paternal grandfather, and sister; Miscarriages / Stillbirths in her maternal aunt, maternal grandmother, mother, paternal grandmother, and sister; Obesity in her father, maternal grandmother, mother, and paternal grandfather; Osteoarthritis in her maternal grandmother; Other in her mother; Stroke in her maternal grandmother, mother, and paternal grandfather; Substance Abuse in her brother, brother, father, and paternal grandfather; Vision Loss in her brother, maternal aunt, maternal grandmother, mother, and paternal grandfather.    Past Surgical History

## 2024-10-02 NOTE — TELEPHONE ENCOUNTER
Patient seen in [facility] ER on 09/18 and was advised to follow up with Neurology and PCP initiated another referral yesterday in system. Patient in today for cardiac appt.   Cardiologist agrees patient needs to follow up with Neurology, are you able to see where her referral stands with the initial referral being started back in September in the ER?    Thank you!

## 2024-10-02 NOTE — PATIENT INSTRUCTIONS
Your assistants today were: ESDRAS Felton and CARY Man  Your provider today was Dr. Jacques  Phone number: [phone]

## 2024-10-03 ENCOUNTER — TELEPHONE (OUTPATIENT)
Dept: FAMILY MEDICINE CLINIC | Age: 34
End: 2024-10-03

## 2024-10-03 NOTE — TELEPHONE ENCOUNTER
I am happy to refer her to any neurologist she likes. Most are taking several weeks to months to get into though, unfortunetly. Dr Mcknight is in [facility], [facility] neurology, or a bigger [facility], [place] or [place] are all appropriate. TS

## 2024-10-03 NOTE — TELEPHONE ENCOUNTER
Pt called in stating that Neurology called her and they are unable to get her in for an appointment until December which means that she will be off work until December.     Pt is wondering what TS would recommends. Pt is worried about her short term disability and it getting approved, but pt is open to seeing a different neurologist if she can get in sooner. Please advise.    Pt would like a call back with response at [phone]

## 2024-10-03 NOTE — TELEPHONE ENCOUNTER
I am happy to refer her to any neurologist she likes. Most are taking several weeks to months to get into though, unfortunetly. Dr Mcknight is in [facility] neurology, or a bigger [facility] or [facility] are all appropriate. TS    Is there anyone you have in mind?

## 2024-10-10 ENCOUNTER — TELEPHONE (OUTPATIENT)
Dept: PSYCHIATRY | Age: 34
End: 2024-10-10

## 2024-10-10 LAB — ECHO BSA: 1.79 M2

## 2024-10-10 NOTE — TELEPHONE ENCOUNTER
Patient stated she went to the  ER on 09/29/24 and they didn't do anything for her. Patient stated she was in so much pain was anxious and wouldn't stop moving her legs.  went out to the nursing station asking if they could give her something and they told him \"she needs to stop moving her legs\". ER doctor ordered her  hydroxyzine and when the nurse went to give it to hear she told the nurse she would take it when she got home. Patient stated  the nurse told her \"be like that then\".    Patient contacted her PCP about the pain and they ordered a echo and a tilt table test. Both came back normal. Patient has a chemical stress test on 10/14 because if she walks a on treadmill she passes out. Patient stated she can't do anything strenuous because patient gets woozy and has to sit down.  Patient stated she has had a decrease in energy for 1 year. It's pretty constant per patient. PCP told her to give the office a call to get a sooner appointment    Patient said her mind racing all the time about money since she has been off work since 09/24. Patient stated she had a silent stroke at work and was out for 2 hours and does not want to go back to work until they find out what is going on with her.    PCP put her on Zofran for the nausea but hasn't gotten it yet because she isn't allowed to drive to the pharmacy.    Patient stated she is having issues sleeping.Patient stated it takes 1-2 hour for her to fall asleep and then is up at 4:00, 5:00 and 6:30 every day. Sometimes she falls asleep during the day if she sits down.  Patient has had a decrease in her appetite. Patient stated she just isn't hungary and she eats when she is hungry . Patients weight is bouncing from 148-158.    Patient denies SI/HI, hallucinations and/or delusions. Patient was advised to proceed to nearest ER or crisis center for any SI/HI, hallucinations/delusions or worsening symptoms. Patient verbally understood.    Last visit 09/24/24  Next

## 2024-10-11 RX ORDER — TRAZODONE HYDROCHLORIDE 50 MG/1
50 TABLET, FILM COATED ORAL NIGHTLY PRN
Qty: 30 TABLET | Refills: 2 | Status: SHIPPED | OUTPATIENT
Start: 2024-10-11

## 2024-10-11 NOTE — TELEPHONE ENCOUNTER
We can try some Trazodone for sleep but she needs to monitor closely for worsening dizziness or lightheadedness. She should take about 15 minutes before bed. If she has side effects I want her to hold medication and let me know.    Dr. Cespedes

## 2024-10-14 ENCOUNTER — HOSPITAL ENCOUNTER (OUTPATIENT)
Dept: NUCLEAR MEDICINE | Age: 34
Discharge: HOME | End: 2024-10-14
Attending: INTERNAL MEDICINE
Payer: COMMERCIAL

## 2024-10-14 ENCOUNTER — HOSPITAL ENCOUNTER (OUTPATIENT)
Age: 34
Discharge: HOME | End: 2024-10-16
Attending: INTERNAL MEDICINE
Payer: COMMERCIAL

## 2024-10-14 VITALS — BODY MASS INDEX: 25.39 KG/M2 | HEIGHT: 66 IN | WEIGHT: 158 LBS

## 2024-10-14 DIAGNOSIS — F41.1 GAD (GENERALIZED ANXIETY DISORDER): ICD-10-CM

## 2024-10-14 DIAGNOSIS — F43.12 CHRONIC POST-TRAUMATIC STRESS DISORDER (PTSD): ICD-10-CM

## 2024-10-14 DIAGNOSIS — F60.3 BORDERLINE PERSONALITY DISORDER (HCC): ICD-10-CM

## 2024-10-14 DIAGNOSIS — R55 SYNCOPE, UNSPECIFIED SYNCOPE TYPE: ICD-10-CM

## 2024-10-14 DIAGNOSIS — F40.10 SOCIAL ANXIETY DISORDER: ICD-10-CM

## 2024-10-14 LAB
ECHO BSA: 1.83 M2
NUC STRESS EJECTION FRACTION: 65 %
STRESS BASELINE DIAS BP: 60 MMHG
STRESS BASELINE HR: 64 BPM
STRESS BASELINE SYS BP: 111 MMHG
STRESS STAGE 1 BP: NORMAL MMHG
STRESS STAGE 1 DURATION: 1 MIN:SEC
STRESS STAGE 1 HR: 95 BPM
STRESS STAGE 2 BP: NORMAL MMHG
STRESS STAGE 2 DURATION: 1 MIN:SEC
STRESS STAGE 2 HR: 88 BPM
STRESS STAGE 3 BP: NORMAL MMHG
STRESS STAGE 3 DURATION: 1 MIN:SEC
STRESS STAGE 3 HR: 83 BPM
STRESS STAGE RECOVERY 1 BP: NORMAL MMHG
STRESS STAGE RECOVERY 1 DURATION: 1 MIN:SEC
STRESS STAGE RECOVERY 1 HR: 81 BPM
STRESS STAGE RECOVERY 2 BP: NORMAL MMHG
STRESS STAGE RECOVERY 2 DURATION: 1 MIN:SEC
STRESS STAGE RECOVERY 2 HR: 74 BPM
STRESS STAGE RECOVERY 3 BP: NORMAL MMHG
STRESS STAGE RECOVERY 3 DURATION: 1 MIN:SEC
STRESS STAGE RECOVERY 3 HR: 78 BPM
STRESS STAGE RECOVERY 4 BP: NORMAL MMHG
STRESS STAGE RECOVERY 4 DURATION: 2 MIN:SEC
STRESS STAGE RECOVERY 4 HR: 82 BPM
STRESS TARGET HR: 188 BPM
TID: 0.97

## 2024-10-14 PROCEDURE — 6360000002 HC RX W HCPCS: Performed by: INTERNAL MEDICINE

## 2024-10-14 PROCEDURE — 93017 CV STRESS TEST TRACING ONLY: CPT

## 2024-10-14 PROCEDURE — 3430000000 HC RX DIAGNOSTIC RADIOPHARMACEUTICAL: Performed by: INTERNAL MEDICINE

## 2024-10-14 PROCEDURE — A9500 TC99M SESTAMIBI: HCPCS | Performed by: INTERNAL MEDICINE

## 2024-10-14 PROCEDURE — 78452 HT MUSCLE IMAGE SPECT MULT: CPT

## 2024-10-14 RX ORDER — HYDROXYZINE HYDROCHLORIDE 50 MG/1
50-100 TABLET, FILM COATED ORAL NIGHTLY PRN
Qty: 60 TABLET | Refills: 2 | Status: SHIPPED | OUTPATIENT
Start: 2024-10-14

## 2024-10-14 RX ORDER — FLUOXETINE 40 MG/1
40 CAPSULE ORAL DAILY
Qty: 90 CAPSULE | Refills: 1 | Status: SHIPPED | OUTPATIENT
Start: 2024-10-14

## 2024-10-14 RX ORDER — TRAZODONE HYDROCHLORIDE 50 MG/1
50 TABLET, FILM COATED ORAL NIGHTLY PRN
Qty: 30 TABLET | Refills: 2 | Status: SHIPPED | OUTPATIENT
Start: 2024-10-14

## 2024-10-14 RX ORDER — REGADENOSON 0.08 MG/ML
0.4 INJECTION, SOLUTION INTRAVENOUS
Status: COMPLETED | OUTPATIENT
Start: 2024-10-14 | End: 2024-10-14

## 2024-10-14 RX ORDER — TETRAKIS(2-METHOXYISOBUTYLISOCYANIDE)COPPER(I) TETRAFLUOROBORATE 1 MG/ML
32.9 INJECTION, POWDER, LYOPHILIZED, FOR SOLUTION INTRAVENOUS
Status: COMPLETED | OUTPATIENT
Start: 2024-10-14 | End: 2024-10-14

## 2024-10-14 RX ORDER — TETRAKIS(2-METHOXYISOBUTYLISOCYANIDE)COPPER(I) TETRAFLUOROBORATE 1 MG/ML
10.4 INJECTION, POWDER, LYOPHILIZED, FOR SOLUTION INTRAVENOUS
Status: COMPLETED | OUTPATIENT
Start: 2024-10-14 | End: 2024-10-14

## 2024-10-14 RX ORDER — GABAPENTIN 300 MG/1
300 CAPSULE ORAL 3 TIMES DAILY
Qty: 90 CAPSULE | Refills: 2 | Status: SHIPPED | OUTPATIENT
Start: 2024-10-14 | End: 2025-01-12

## 2024-10-14 RX ADMIN — Medication 10.4 MILLICURIE: at 07:36

## 2024-10-14 RX ADMIN — REGADENOSON 0.4 MG: 0.08 INJECTION, SOLUTION INTRAVENOUS at 08:24

## 2024-10-14 RX ADMIN — Medication 32.9 MILLICURIE: at 08:25

## 2024-10-14 NOTE — TELEPHONE ENCOUNTER
Patient reports she was never able to start the Trazodone due to the pharmacy issues. She reports her significant other will be picking up the medication this evening. Staff asked patient to keep provider updated.

## 2024-10-14 NOTE — TELEPHONE ENCOUNTER
Sabina Bocanegra called requesting a refill on the following medications:  Requested Prescriptions     Pending Prescriptions Disp Refills    traZODone (DESYREL) 50 MG tablet 30 tablet 2     Sig: Take 1 tablet by mouth nightly as needed for Sleep    hydrOXYzine HCl (ATARAX) 50 MG tablet 60 tablet 2     Sig: Take 1-2 tablets by mouth nightly as needed for Anxiety (and insomnia)    gabapentin (NEURONTIN) 300 MG capsule 90 capsule 2     Sig: Take 1 capsule by mouth 3 times daily for 90 days. Intended supply: 90 days    FLUoxetine (PROZAC) 40 MG capsule 90 capsule 1     Sig: Take 1 capsule by mouth daily     She reports she is no longer using [facility] pharmacy and is requesting Rx's to be sent ASAP to [facility]. She reports she is out of her gabapentin and has only one more dose of fluoxetine. Reports running out of gabapentin this morning. Previous Rx's sent to [facility], which is out of network for her insurance plan.     Date of last visit: 9/25/2024  Date of next visit (if applicable):11/13/2024  Pharmacy Name: [facility] [address]      Thanks,  Debra Julian MA

## 2024-10-17 ENCOUNTER — OFFICE VISIT (OUTPATIENT)
Dept: FAMILY MEDICINE CLINIC | Age: 34
End: 2024-10-17
Payer: COMMERCIAL

## 2024-10-17 ENCOUNTER — TELEPHONE (OUTPATIENT)
Dept: CARDIOLOGY CLINIC | Age: 34
End: 2024-10-17

## 2024-10-17 ENCOUNTER — TELEPHONE (OUTPATIENT)
Dept: PSYCHIATRY | Age: 34
End: 2024-10-17

## 2024-10-17 VITALS
HEART RATE: 70 BPM | DIASTOLIC BLOOD PRESSURE: 80 MMHG | RESPIRATION RATE: 16 BRPM | SYSTOLIC BLOOD PRESSURE: 124 MMHG | BODY MASS INDEX: 26.05 KG/M2 | TEMPERATURE: 97.9 F | WEIGHT: 161.4 LBS

## 2024-10-17 DIAGNOSIS — R53.83 FATIGUE, UNSPECIFIED TYPE: ICD-10-CM

## 2024-10-17 DIAGNOSIS — R55 SYNCOPE, UNSPECIFIED SYNCOPE TYPE: Primary | ICD-10-CM

## 2024-10-17 DIAGNOSIS — G47.52 RAPID EYE MOVEMENT BEHAVIORAL DISORDER: ICD-10-CM

## 2024-10-17 DIAGNOSIS — R25.3 MUSCLE TWITCHING: ICD-10-CM

## 2024-10-17 PROCEDURE — 99213 OFFICE O/P EST LOW 20 MIN: CPT | Performed by: NURSE PRACTITIONER

## 2024-10-17 NOTE — TELEPHONE ENCOUNTER
Sabina wrote into the office via [employer]; she labeled the message Trazodone.      Officially on day 3. I do not like how I feel. It’s very triggering. I feel high and it makes me remember this feeling from addiction.     Please advise. She is scheduled to return on 11/13/24.

## 2024-10-17 NOTE — PROGRESS NOTES
Chief Complaint   Patient presents with    Follow-up     Pt here for 3wk f/u         SUBJECTIVE     Sabina Bocanegra is a 33 y.o.female      Pt presents for follow up. Since her last visit she was started on trazodone for sleep but is not liking the way she makes her feel. She was taken off prazosin due to BP issues.  Stress test has resulted. Was told they would call her to schedule follow up but she has not heard from them yet. She is concerned about a perfusion defect.  Tilt table was unremarkable.     Continues to pass out at home. States she was in her craft room and she stood up and went to walk to the bathroom and passed out. Boyfriend found her right after and states she responded right away when he touched her. Bending over really bothers her. Long days are really hard on her as well. Went to daughter's soccer tournament all day and then passed out in the car on the away home.    Boyfriend denies any jerking, twitching, rapid eye movements when she passes out. Pt states her father did notice some arm twitching/jerking when she passed out the other day. Her mom has noticed some head nodding or swiveling. Son told her that her eyes dart back and forth.    Has appt with neurology on December 4th.      Review of Systems   Constitutional:  Positive for fatigue. Negative for chills, diaphoresis and fever.   Respiratory:  Negative for shortness of breath.    Cardiovascular:  Negative for chest pain, palpitations and leg swelling.   Gastrointestinal:  Negative for blood in stool, constipation, diarrhea, nausea and vomiting.   Genitourinary:  Negative for dysuria and hematuria.   Musculoskeletal:  Negative for myalgias.   Neurological:  Positive for dizziness, syncope and weakness. Negative for headaches.   Psychiatric/Behavioral:  Positive for sleep disturbance.    All other systems reviewed and are negative.        OBJECTIVE     /80 (Site: Left Upper Arm, Position: Sitting)   Pulse 70   Temp 97.9 °F (36.6

## 2024-10-17 NOTE — TELEPHONE ENCOUNTER
Patient can stop medication. If she is unable to sleep after stopping then we may need to consider other options, which we can discuss at next visit.

## 2024-10-17 NOTE — TELEPHONE ENCOUNTER
Janay Silva, [facility]  Sabina Bocanegra30 minutes ago (2:54 PM)     NS  Hi Sabina,   Your stress test is normal, which part are you referring to?     This MyChart message has not been read.     Sabina Bocanegra  P [facility] Clinical Staff (supporting Marleny Jacques MD)42 minutes ago (2:42 PM)       Is there anyway I can get a call or set an appt to go over my results. I’m freaking myself out and would like to go over them soon.       I reviewed stress test results over the phone with patient.

## 2024-10-21 ENCOUNTER — PATIENT MESSAGE (OUTPATIENT)
Dept: FAMILY MEDICINE CLINIC | Age: 34
End: 2024-10-21

## 2024-10-22 ASSESSMENT — ENCOUNTER SYMPTOMS
CONSTIPATION: 0
BLOOD IN STOOL: 0
VOMITING: 0
NAUSEA: 0
SHORTNESS OF BREATH: 0
DIARRHEA: 0

## 2024-10-23 ENCOUNTER — HOSPITAL ENCOUNTER (OUTPATIENT)
Dept: NEUROLOGY | Age: 34
Discharge: HOME | End: 2024-10-23
Payer: COMMERCIAL

## 2024-10-23 DIAGNOSIS — R55 SYNCOPE, UNSPECIFIED SYNCOPE TYPE: ICD-10-CM

## 2024-10-23 DIAGNOSIS — G47.52 RAPID EYE MOVEMENT BEHAVIORAL DISORDER: ICD-10-CM

## 2024-10-23 DIAGNOSIS — R25.3 MUSCLE TWITCHING: ICD-10-CM

## 2024-10-23 PROCEDURE — 95819 EEG AWAKE AND ASLEEP: CPT

## 2024-10-23 NOTE — PROGRESS NOTES
[facility]     Neurodiagnostic Laboratory Technician Worksheet      EEG Date: 10/23/2024    Name: Sabina Bocanegra  : 1990  Age: 33 y.o.  Sex: female  MRN: [id number]  CSN: [id number]    Room/Location: Room/bed info not found  Ordering Provider: BLAS mejia    EEG Number: [id number]    Time In: 1127  Time Out: 1151  Total Treatment Time: 24 mins     Clinical History: pt having episodes of passing out with new symptoms of body twitching, darting eye movements and heaviness in legs.     Past Medical History:       Diagnosis Date    ADHD (attention deficit hyperactivity disorder)     Anxiety     Cancer ([facility]) 2017    Chronic back pain 2008    Depression     Drug abuse ([facility])     Fibromyalgia     GERD (gastroesophageal reflux disease) 2009    GI (gastrointestinal bleed) 2010    Headache 2005    Hearing loss     Irritable bowel syndrome 2009    Osteoarthritis 2018       Scheduled Meds:  Continuous Infusions:  PRN Meds:.    Hand Dominance: Right   Sedation: No   H.V. Completed: No, pt anxious    Photic: Yes   Sleep: Yes   Drowsy: Yes   Sleep Deprived: Yes   Seizures Observed: No   Mentality: alert     Technician: Bertha Baez 10/23/2024

## 2024-10-25 NOTE — PROCEDURES
[address] [address]                       ELECTROENCEPHALOGRAM REPORT      PATIENT NAME: LAWRENCE SERRA              : 1990  MED REC NO: [id number]                       ROOM:   ACCOUNT NO: [id number]                       ADMIT DATE: 10/23/2024  PROVIDER: Sydnee Garay MD      DATE OF SERVICE:  10/23/2024    REFERRING PHYSICIAN:  Jennyfer Butcher CNP    CLINICAL HISTORY:  This is a 33-year-old female presenting with episodes of passing out, body twitching, darting eye movements, and heaviness.    PAST MEDICAL HISTORY:  Significant for ADHD, anxiety, cancer, depression, drug use, fibromyalgia, GERD, headache, hearing loss, irritable bowel syndrome, osteoarthritis.    This is a routine 20-minute EEG recording using the [employer] 10/20 system on Dragon Ports workstation.  Automated spike and seizure detection algorithms were applied.  The patient is described as alert.    The background rhythm activity is noted to be 9-10 hertz in the posterior parietal area, symmetric, attenuates with eye opening.  Lead artifact and muscle artifact were noted limiting quality of data obtained.  Hyperventilation and photic stimulation were performed without abnormality.  EKG tracing revealed regular heart rate.  The patient was noted to have rocking movement that did not have any EEG correlate.  Left eye twitching did not have any EEG correlate.  The patient is noted to be drowsy during parts of recording.  Light stages of sleep are seen during the recording.  Vertex sharps were noted during the sleep parts of recording.  Occasional bifrontal sharp waves were noted during the recording that are of questionable significance, though they may be epileptogenic in nature, or indicate seizure tendency.    IMPRESSION:  This is an abnormal awake and sleep EEG due to presence of occasional bifrontal sharp waves during the recording that are of questionable

## 2024-10-30 ENCOUNTER — PATIENT MESSAGE (OUTPATIENT)
Dept: FAMILY MEDICINE CLINIC | Age: 34
End: 2024-10-30

## 2024-10-31 ENCOUNTER — OFFICE VISIT (OUTPATIENT)
Dept: NEUROLOGY | Age: 34
End: 2024-10-31
Payer: COMMERCIAL

## 2024-10-31 ENCOUNTER — TELEPHONE (OUTPATIENT)
Dept: PSYCHIATRY | Age: 34
End: 2024-10-31

## 2024-10-31 VITALS
DIASTOLIC BLOOD PRESSURE: 85 MMHG | SYSTOLIC BLOOD PRESSURE: 130 MMHG | OXYGEN SATURATION: 99 % | HEIGHT: 66 IN | WEIGHT: 161 LBS | HEART RATE: 69 BPM | BODY MASS INDEX: 25.88 KG/M2

## 2024-10-31 DIAGNOSIS — R55 SYNCOPE AND COLLAPSE: ICD-10-CM

## 2024-10-31 DIAGNOSIS — R56.9 SEIZURE-LIKE ACTIVITY (HCC): Primary | ICD-10-CM

## 2024-10-31 PROCEDURE — 99205 OFFICE O/P NEW HI 60 MIN: CPT | Performed by: PSYCHIATRY & NEUROLOGY

## 2024-10-31 NOTE — PATIENT INSTRUCTIONS
Referral to neurology at higher level at [facility]  No driving, swimming, operating heavy machinery or compromising heights until event free for 6 months. Report any new events. Call if any questions.  Call with any new symptoms or concerns.

## 2024-10-31 NOTE — TELEPHONE ENCOUNTER
Sabina wrote into the office via [employer]:    Is there something we can do to help me sleep or wait until I see Dr leopold?!     I am not sleeping. I am exhausted and in a lot of pain. My neuro appt was moved up to tomorrow 10/31 @ 9am.     Please advise. She is scheduled to return on 11/13/24.

## 2024-11-01 NOTE — TELEPHONE ENCOUNTER
Wrote back to Sabina with this information via [employer]; awaiting response.

## 2024-11-01 NOTE — TELEPHONE ENCOUNTER
Please inquire if pain is what is keeping patient up. Is she having a hard time falling or staying asleep?

## 2024-11-04 ENCOUNTER — TELEPHONE (OUTPATIENT)
Dept: PSYCHIATRY | Age: 34
End: 2024-11-04

## 2024-11-04 NOTE — TELEPHONE ENCOUNTER
Sabina wrote into the office via [employer]:    I seen neurology yesterday oct 31. Uhm I’m taking an extra hydroxzine every night to help get me to sleep. But I’m still waking up around 3:50am/4am every night/morning. Dr. Garay is referring me to [facility]. To see a seizure specialist. He’s fairly confident I have epilepsy and some form of narcolepsy! Pain is just a daily thing I usually don’t pay attention too because I’m at work all the time. A lot of aches and pains are rising since the big seizure sept 20

## 2024-11-05 DIAGNOSIS — F43.12 CHRONIC POST-TRAUMATIC STRESS DISORDER (PTSD): ICD-10-CM

## 2024-11-05 DIAGNOSIS — F41.1 GAD (GENERALIZED ANXIETY DISORDER): ICD-10-CM

## 2024-11-05 DIAGNOSIS — F60.3 BORDERLINE PERSONALITY DISORDER (HCC): ICD-10-CM

## 2024-11-05 RX ORDER — HYDROXYZINE HYDROCHLORIDE 50 MG/1
50-100 TABLET, FILM COATED ORAL NIGHTLY PRN
Qty: 180 TABLET | Refills: 1 | OUTPATIENT
Start: 2024-11-05

## 2024-11-05 NOTE — TELEPHONE ENCOUNTER
Sabina wrote back into the office via [employer]:    So maybe 5 if I don’t wake up to use the bathroom or as of lately since the incident if I don’t have a seizure.     I got to bed around 9pm and wake up at 330 every night.     Please advise.

## 2024-11-07 NOTE — PROGRESS NOTES
who presents today for evaluation of episode of syncope and collapse/seizure like activity in September 2024. She was at work, walking back from her lunch break.  She suddenly dropped on all fours and started dry heaving. She does not have any recollection of this event.   She was then noted by her coworkers convulsing. She denies any previous episodes in the past. I reviewed the MRI Brain and agree with interpretation, I also reviewed the patient pertinent labs and records in the EHR and from other providers.   MRI brain WO contrast done 9/20/24 showed Negative MRI scan of the brain. CTA head and neck W/WO contrast done 9/20/24 showed Normal CTA of the head and neck. EEG done 10/2324 showed abnormal awake and sleep EEG due to presence of occasional bifrontal sharp waves during the recording that are of questionable significance, though they may be epileptogenic in nature, or indicate seizure tendency in the proper clinical context. No clinical seizures were observed. The patient was experiencing rocking movement that did not have any EEG correlate. She denies any previous history of seizure in the past. She is following with cardiology and underwent testing including tilt table test, event monitor, stress test, and cardiac echo that showed no concerning findings.  She has history of drug abuse, cocaine and opioids, and has been clean for 14 years. The patient was counseled about her symptoms and work up recommended. Difficult to tell if event was epileptic versus nonepileptic. Would hold off on committing her to anti-epileptic medication as this was her first event. She would benefit from formal evaluation with neurology epilepsy clinic, with availability of monitoring, at higher level of care regarding her symptoms. She was instructed to refrain from driving, swimming, operating heavy machinery or compromising heights until event free for 6 months, or cleared sooner by neurology. After a discussion with patient

## 2024-11-08 NOTE — TELEPHONE ENCOUNTER
We could try Doxepin, which is a medication that can help sleep maintenance. However, many of these medications have the potential to worsen the passing out episodes.

## 2024-11-08 NOTE — TELEPHONE ENCOUNTER
I wrote back to Sabina with this information via [employer]. Awaiting response.    You may receive a survey regarding the care you received during your visit. Your input is valuable to us. We encourage you to complete and return your survey. We hope you will choose us in the future for your healthcare needs. Continue:  · Continue current medications  · Daily weights and record  · Fluid restriction of 2 Liters per day  · Limit sodium in diet to around 0984-3276 mg/day  · Monitor BP  · Activity as tolerated     Call the [facility] Failure Clinic for any of the following symptoms: [phone]   Weight gain of 2-3 pounds in 1 day or 5 pounds in 1 week   Increased shortness of breath   Shortness of breath while laying down   Cough   Chest pain   Swelling in feet, ankles or legs   Tenderness or bloating in the abdomen   Fatigue    Decreased appetite or nausea    Confusion       Stable, appears Euvolemic  Lab reviewed - stable, K is 3.5. Cr. .8  Repeat blood work today, and if blood work ok today, we will increase the Entresto dosage. BP/HR stable   Changing KCl 20 daily. Continue diet/fluid adherence  Continue daily wts.   F/U w/ Cardiology  F/U in clinic in 6 weeks

## 2024-11-11 NOTE — TELEPHONE ENCOUNTER
Sabina wrote back into the office via [employer]:    I will wait until we speak at our next appt me. There are a few things I wanted to talk to you about. Mainly the sleep problems and physical pain I’m in. It sounds like neuro is confident that it’s epilepsy or narcolepsy/catalepsy of some sorts. So I’d rather speak to you before we do anything. Neuro and my pcp won’t let me go back to work until I’ve been seizure free for 6 months or have a clear diagnosis and physical my able to go to work. My anxiety and depression are through the roof and I’m barely hanging on every day. I really need to see a therapist because I am an emotional wreck which is making my whole body hurt. I will see you in the 13th

## 2024-11-11 NOTE — TELEPHONE ENCOUNTER
You can offer patient earlier appointment for tomorrow. Please see if she has our therapy resource packet as well.    Dr. Cespedes

## 2024-11-11 NOTE — TELEPHONE ENCOUNTER
Spoke with Sabina; she said that she will just keep her appointment for Wednesday as it is virtual. Counseling resource packet mailed to the patient as well.

## 2024-11-13 ENCOUNTER — TELEMEDICINE (OUTPATIENT)
Dept: PSYCHIATRY | Age: 34
End: 2024-11-13

## 2024-11-13 DIAGNOSIS — F39 UNSPECIFIED MOOD (AFFECTIVE) DISORDER (HCC): Primary | ICD-10-CM

## 2024-11-13 DIAGNOSIS — F41.1 GAD (GENERALIZED ANXIETY DISORDER): ICD-10-CM

## 2024-11-13 DIAGNOSIS — F40.10 SOCIAL ANXIETY DISORDER: ICD-10-CM

## 2024-11-13 DIAGNOSIS — F43.12 CHRONIC POST-TRAUMATIC STRESS DISORDER (PTSD): ICD-10-CM

## 2024-11-13 ASSESSMENT — COLUMBIA-SUICIDE SEVERITY RATING SCALE - C-SSRS
2. IN THE PAST MONTH, HAVE YOU ACTUALLY HAD ANY THOUGHTS OF KILLING YOURSELF?: NO
6. IN YOUR LIFETIME, HAVE YOU EVER DONE ANYTHING, STARTED TO DO ANYTHING, OR PREPARED TO DO ANYTHING TO END YOUR LIFE?: YES
7. DID THIS OCCUR IN THE LAST THREE MONTHS: NO
1. IN THE PAST MONTH, HAVE YOU WISHED YOU WERE DEAD OR WISHED YOU COULD GO TO SLEEP AND NOT WAKE UP?: NO

## 2024-11-13 ASSESSMENT — PATIENT HEALTH QUESTIONNAIRE - PHQ9
8. MOVING OR SPEAKING SO SLOWLY THAT OTHER PEOPLE COULD HAVE NOTICED. OR THE OPPOSITE, BEING SO FIGETY OR RESTLESS THAT YOU HAVE BEEN MOVING AROUND A LOT MORE THAN USUAL: NEARLY EVERY DAY
7. TROUBLE CONCENTRATING ON THINGS, SUCH AS READING THE NEWSPAPER OR WATCHING TELEVISION: NEARLY EVERY DAY
9. THOUGHTS THAT YOU WOULD BE BETTER OFF DEAD, OR OF HURTING YOURSELF: NOT AT ALL
5. POOR APPETITE OR OVEREATING: NEARLY EVERY DAY
SUM OF ALL RESPONSES TO PHQ9 QUESTIONS 1 & 2: 6
SUM OF ALL RESPONSES TO PHQ QUESTIONS 1-9: 24
7. TROUBLE CONCENTRATING ON THINGS, SUCH AS READING THE NEWSPAPER OR WATCHING TELEVISION: NEARLY EVERY DAY
6. FEELING BAD ABOUT YOURSELF - OR THAT YOU ARE A FAILURE OR HAVE LET YOURSELF OR YOUR FAMILY DOWN: NEARLY EVERY DAY
4. FEELING TIRED OR HAVING LITTLE ENERGY: NEARLY EVERY DAY
2. FEELING DOWN, DEPRESSED OR HOPELESS: NEARLY EVERY DAY
10. IF YOU CHECKED OFF ANY PROBLEMS, HOW DIFFICULT HAVE THESE PROBLEMS MADE IT FOR YOU TO DO YOUR WORK, TAKE CARE OF THINGS AT HOME, OR GET ALONG WITH OTHER PEOPLE: EXTREMELY DIFFICULT
SUM OF ALL RESPONSES TO PHQ QUESTIONS 1-9: 24
1. LITTLE INTEREST OR PLEASURE IN DOING THINGS: NEARLY EVERY DAY
SUM OF ALL RESPONSES TO PHQ QUESTIONS 1-9: 24
5. POOR APPETITE OR OVEREATING: NEARLY EVERY DAY
1. LITTLE INTEREST OR PLEASURE IN DOING THINGS: NEARLY EVERY DAY
4. FEELING TIRED OR HAVING LITTLE ENERGY: NEARLY EVERY DAY
8. MOVING OR SPEAKING SO SLOWLY THAT OTHER PEOPLE COULD HAVE NOTICED. OR THE OPPOSITE - BEING SO FIDGETY OR RESTLESS THAT YOU HAVE BEEN MOVING AROUND A LOT MORE THAN USUAL: NEARLY EVERY DAY
SUM OF ALL RESPONSES TO PHQ QUESTIONS 1-9: 24
9. THOUGHTS THAT YOU WOULD BE BETTER OFF DEAD, OR OF HURTING YOURSELF: NOT AT ALL
6. FEELING BAD ABOUT YOURSELF - OR THAT YOU ARE A FAILURE OR HAVE LET YOURSELF OR YOUR FAMILY DOWN: NEARLY EVERY DAY
SUM OF ALL RESPONSES TO PHQ QUESTIONS 1-9: 24
3. TROUBLE FALLING OR STAYING ASLEEP: NEARLY EVERY DAY
10. IF YOU CHECKED OFF ANY PROBLEMS, HOW DIFFICULT HAVE THESE PROBLEMS MADE IT FOR YOU TO DO YOUR WORK, TAKE CARE OF THINGS AT HOME, OR GET ALONG WITH OTHER PEOPLE: EXTREMELY DIFFICULT
2. FEELING DOWN, DEPRESSED OR HOPELESS: NEARLY EVERY DAY
3. TROUBLE FALLING OR STAYING ASLEEP: NEARLY EVERY DAY

## 2024-11-13 ASSESSMENT — ANXIETY QUESTIONNAIRES
6. BECOMING EASILY ANNOYED OR IRRITABLE: MORE THAN HALF THE DAYS
1. FEELING NERVOUS, ANXIOUS, OR ON EDGE: NEARLY EVERY DAY
3. WORRYING TOO MUCH ABOUT DIFFERENT THINGS: NEARLY EVERY DAY
5. BEING SO RESTLESS THAT IT IS HARD TO SIT STILL: NEARLY EVERY DAY
7. FEELING AFRAID AS IF SOMETHING AWFUL MIGHT HAPPEN: MORE THAN HALF THE DAYS
3. WORRYING TOO MUCH ABOUT DIFFERENT THINGS: NEARLY EVERY DAY
4. TROUBLE RELAXING: NEARLY EVERY DAY
1. FEELING NERVOUS, ANXIOUS, OR ON EDGE: NEARLY EVERY DAY
7. FEELING AFRAID AS IF SOMETHING AWFUL MIGHT HAPPEN: MORE THAN HALF THE DAYS
IF YOU CHECKED OFF ANY PROBLEMS ON THIS QUESTIONNAIRE, HOW DIFFICULT HAVE THESE PROBLEMS MADE IT FOR YOU TO DO YOUR WORK, TAKE CARE OF THINGS AT HOME, OR GET ALONG WITH OTHER PEOPLE: EXTREMELY DIFFICULT
5. BEING SO RESTLESS THAT IT IS HARD TO SIT STILL: NEARLY EVERY DAY
IF YOU CHECKED OFF ANY PROBLEMS ON THIS QUESTIONNAIRE, HOW DIFFICULT HAVE THESE PROBLEMS MADE IT FOR YOU TO DO YOUR WORK, TAKE CARE OF THINGS AT HOME, OR GET ALONG WITH OTHER PEOPLE: EXTREMELY DIFFICULT
4. TROUBLE RELAXING: NEARLY EVERY DAY
GAD7 TOTAL SCORE: 19
6. BECOMING EASILY ANNOYED OR IRRITABLE: MORE THAN HALF THE DAYS
2. NOT BEING ABLE TO STOP OR CONTROL WORRYING: NEARLY EVERY DAY
2. NOT BEING ABLE TO STOP OR CONTROL WORRYING: NEARLY EVERY DAY

## 2024-11-13 NOTE — PROGRESS NOTES
[facility] [place] [facility]  [facility]  [address] [facility] [id number] [phone]    Progress Note    Patient:  Sabina Bocanegra  YOB: 1990  PCP:  Jennyfer Butcher APRN - CNP  Visit Date:  11/13/2024      CC: Follow up for medication management and psychotherapy for    Diagnosis Orders   1. Unspecified mood (affective) disorder (HCC)        2. Chronic post-traumatic stress disorder (PTSD)        3. MARGO (generalized anxiety disorder)        4. Social anxiety disorder              SUBJECTIVE:      Sabina Bocanegra, a 34 y.o. female, presents for a follow up visit.  Reports continued medical stress.  Recently saw neurology and reports they feel she likely has epilepsy and possibly narcolepsy.  They referred her out to [facility] but first appointment isn't until January.  Patient continues to have multiple episodes throughout the day.  Reports continued syncopal episodes as well.  Reports difficult time sleeping.  Reports when she does not sleep she is more irritable.  Also identifies not being able to go to work and support herself as something that frustrates her.  Having a hard time allowing her significant other to provide financial support.  We processed through this today.  She denied suicidal or homicidal ideation, intent or plan.  Denied auditory or visual hallucinations.    17 minutes was spent in supportive psychotherapy.  Focus was on stressors.      OBJECTIVE:      11/10/2024     9:59 AM   Patient-Reported Vitals   Patient-Reported Weight 162   Patient-Reported Height 5’6”   Patient-Reported Systolic 118 mmHg   Patient-Reported Diastolic 82 mmHg   Patient-Reported Pulse 78   Patient-Reported Temperature 98.9   Patient-Reported SpO2 98          MENTAL STATUS EXAM:  Orientation: Alert, oriented, thought content appropriate   Mood:. \"Not good\"      Affect:  Anxious      Appearance:  Age Appropriate, Well Groomed, Clothing Appropriate for Age, and

## 2024-11-14 NOTE — PATIENT INSTRUCTIONS
Plan:  Sabina Bocanegra, it was nice seeing you today  Increase gabapentin to 300 mg 4 times daily.  Monitor for side effects  Continue Prozac and hydroxyzine  Reach out to [facility] to see if they take her insurance for therapy  Follow up in 4 weeks but reach out sooner if needed      -Please take medications as prescribed  -Refrain from alcohol or drug use  -Seek emergency help via the emergency and/or calling 911 should symptoms become severe, worsen, or with other concerning symptoms.Go immediately to the emergency room and/or call 911 with any suicidal or homicidal ideations or if audio/visual hallucinations develop.   -Contact office with any questions or concerns.      Crisis phone numbers:  988-national suicide hotline, call or text  [facility] [phone].  [facility] [phone]  [facility] [phone].  [facility] [phone].  [facility] [phone].  [facility] [phone].

## 2024-11-25 ENCOUNTER — PATIENT MESSAGE (OUTPATIENT)
Dept: FAMILY MEDICINE CLINIC | Age: 34
End: 2024-11-25

## 2024-11-25 DIAGNOSIS — F41.1 GAD (GENERALIZED ANXIETY DISORDER): ICD-10-CM

## 2024-11-25 DIAGNOSIS — F40.10 SOCIAL ANXIETY DISORDER: ICD-10-CM

## 2024-11-25 RX ORDER — GABAPENTIN 300 MG/1
300 CAPSULE ORAL 4 TIMES DAILY
Qty: 120 CAPSULE | Refills: 2 | Status: SHIPPED | OUTPATIENT
Start: 2024-11-25 | End: 2025-02-23

## 2024-11-25 NOTE — TELEPHONE ENCOUNTER
Sabina called into the office stating that she was increased on her Gabapentin to 4 times daily but she said that she is 30 days short of her medication because of this increase. Per chart; the last Rx was sent for the TID on 10/14/24 for a 30 day supply with 2 refills.    Medication is pending your approval for the Gabapentin 300mg;#120 with 0 refills (QID). Please advise otherwise. She is scheduled to return on 12/16/24. Last seen on 11/13/24.

## 2024-11-26 ENCOUNTER — OFFICE VISIT (OUTPATIENT)
Dept: FAMILY MEDICINE CLINIC | Age: 34
End: 2024-11-26
Payer: COMMERCIAL

## 2024-11-26 VITALS
WEIGHT: 162 LBS | BODY MASS INDEX: 26.03 KG/M2 | HEIGHT: 66 IN | SYSTOLIC BLOOD PRESSURE: 124 MMHG | RESPIRATION RATE: 16 BRPM | DIASTOLIC BLOOD PRESSURE: 84 MMHG | HEART RATE: 72 BPM | TEMPERATURE: 98 F

## 2024-11-26 DIAGNOSIS — R55 SYNCOPE, UNSPECIFIED SYNCOPE TYPE: Primary | ICD-10-CM

## 2024-11-26 DIAGNOSIS — F40.10 SOCIAL ANXIETY DISORDER: ICD-10-CM

## 2024-11-26 DIAGNOSIS — R53.83 FATIGUE, UNSPECIFIED TYPE: ICD-10-CM

## 2024-11-26 DIAGNOSIS — F41.1 GAD (GENERALIZED ANXIETY DISORDER): ICD-10-CM

## 2024-11-26 DIAGNOSIS — R63.0 LOSS OF APPETITE FOR MORE THAN 2 WEEKS: ICD-10-CM

## 2024-11-26 PROCEDURE — 99214 OFFICE O/P EST MOD 30 MIN: CPT | Performed by: NURSE PRACTITIONER

## 2024-11-26 ASSESSMENT — ENCOUNTER SYMPTOMS
DIARRHEA: 1
VOMITING: 0
NAUSEA: 1
SHORTNESS OF BREATH: 0

## 2024-11-26 NOTE — PROGRESS NOTES
Musculoskeletal:  Negative for myalgias.   Neurological:  Positive for tremors (resting. boyfriend notices more that her.), syncope, weakness (generalized) and headaches.   Psychiatric/Behavioral:  Positive for dysphoric mood and sleep disturbance. The patient is nervous/anxious.    All other systems reviewed and are negative.      OBJECTIVE     /84   Pulse 72   Temp 98 °F (36.7 °C) (Oral)   Resp 16   Ht 1.676 m (5' 6\")   Wt 73.5 kg (162 lb)   BMI 26.15 kg/m²     Wt Readings from Last 3 Encounters:   11/26/24 73.5 kg (162 lb)   10/31/24 73 kg (161 lb)   10/17/24 73.2 kg (161 lb 6.4 oz)       Physical Exam  Vitals and nursing note reviewed.   HENT:      Head: Normocephalic and atraumatic.      Right Ear: External ear normal.      Left Ear: External ear normal.      Nose: Nose normal.   Eyes:      Conjunctiva/sclera: Conjunctivae normal.      Pupils: Pupils are equal, round, and reactive to light.   Cardiovascular:      Rate and Rhythm: Normal rate and regular rhythm.      Heart sounds: Normal heart sounds.   Pulmonary:      Effort: Pulmonary effort is normal.      Breath sounds: Normal breath sounds.   Musculoskeletal:         General: Normal range of motion.      Cervical back: Normal range of motion and neck supple.   Skin:     General: Skin is warm and dry.   Neurological:      Mental Status: She is alert and oriented to person, place, and time.      Deep Tendon Reflexes: Reflexes are normal and symmetric.   Psychiatric:         Mood and Affect: Mood is depressed. Affect is tearful.         Behavior: Behavior normal.         Thought Content: Thought content normal.         Judgment: Judgment normal.           EEG IMPRESSION: This is an abnormal awake and sleep EEG due to presence of occasional bifrontal sharp waves during the recording that are of questionable significance, though they may be epileptogenic in nature, or indicate seizure tendency in the proper clinical context. No clinical seizures

## 2024-12-16 ENCOUNTER — TELEMEDICINE (OUTPATIENT)
Dept: PSYCHIATRY | Age: 34
End: 2024-12-16
Payer: COMMERCIAL

## 2024-12-16 DIAGNOSIS — F43.10 PTSD (POST-TRAUMATIC STRESS DISORDER): Primary | ICD-10-CM

## 2024-12-16 DIAGNOSIS — F60.3 BORDERLINE PERSONALITY DISORDER (HCC): ICD-10-CM

## 2024-12-16 DIAGNOSIS — F40.10 SOCIAL ANXIETY DISORDER: ICD-10-CM

## 2024-12-16 DIAGNOSIS — F39 UNSPECIFIED MOOD (AFFECTIVE) DISORDER (HCC): ICD-10-CM

## 2024-12-16 PROCEDURE — 90833 PSYTX W PT W E/M 30 MIN: CPT | Performed by: PSYCHIATRY & NEUROLOGY

## 2024-12-16 PROCEDURE — 99214 OFFICE O/P EST MOD 30 MIN: CPT | Performed by: PSYCHIATRY & NEUROLOGY

## 2024-12-16 ASSESSMENT — COLUMBIA-SUICIDE SEVERITY RATING SCALE - C-SSRS
1. IN THE PAST MONTH, HAVE YOU WISHED YOU WERE DEAD OR WISHED YOU COULD GO TO SLEEP AND NOT WAKE UP?: YES
3. IN THE PAST MONTH, HAVE YOU BEEN THINKING ABOUT HOW YOU MIGHT KILL YOURSELF?: YES
4. IN THE PAST MONTH, HAVE YOU HAD THESE THOUGHTS AND HAD SOME INTENTION OF ACTING ON THEM?: NO
6. IN YOUR LIFETIME, HAVE YOU EVER DONE ANYTHING, STARTED TO DO ANYTHING, OR PREPARED TO DO ANYTHING TO END YOUR LIFE?: YES
5. IN THE PAST MONTH, HAVE YOU STARTED TO WORK OUT OR WORKED OUT THE DETAILS OF HOW TO KILL YOURSELF? DO YOU INTEND TO CARRY OUT THIS PLAN?: NO
7. DID THIS OCCUR IN THE LAST THREE MONTHS: NO
2. IN THE PAST MONTH, HAVE YOU ACTUALLY HAD ANY THOUGHTS OF KILLING YOURSELF?: YES

## 2024-12-16 ASSESSMENT — PATIENT HEALTH QUESTIONNAIRE - PHQ9
SUM OF ALL RESPONSES TO PHQ QUESTIONS 1-9: 25
4. FEELING TIRED OR HAVING LITTLE ENERGY: NEARLY EVERY DAY
SUM OF ALL RESPONSES TO PHQ QUESTIONS 1-9: 25
5. POOR APPETITE OR OVEREATING: NEARLY EVERY DAY
9. THOUGHTS THAT YOU WOULD BE BETTER OFF DEAD, OR OF HURTING YOURSELF: SEVERAL DAYS
6. FEELING BAD ABOUT YOURSELF - OR THAT YOU ARE A FAILURE OR HAVE LET YOURSELF OR YOUR FAMILY DOWN: NEARLY EVERY DAY
8. MOVING OR SPEAKING SO SLOWLY THAT OTHER PEOPLE COULD HAVE NOTICED. OR THE OPPOSITE - BEING SO FIDGETY OR RESTLESS THAT YOU HAVE BEEN MOVING AROUND A LOT MORE THAN USUAL: NEARLY EVERY DAY
1. LITTLE INTEREST OR PLEASURE IN DOING THINGS: NEARLY EVERY DAY
10. IF YOU CHECKED OFF ANY PROBLEMS, HOW DIFFICULT HAVE THESE PROBLEMS MADE IT FOR YOU TO DO YOUR WORK, TAKE CARE OF THINGS AT HOME, OR GET ALONG WITH OTHER PEOPLE: EXTREMELY DIFFICULT
4. FEELING TIRED OR HAVING LITTLE ENERGY: NEARLY EVERY DAY
2. FEELING DOWN, DEPRESSED OR HOPELESS: NEARLY EVERY DAY
1. LITTLE INTEREST OR PLEASURE IN DOING THINGS: NEARLY EVERY DAY
SUM OF ALL RESPONSES TO PHQ QUESTIONS 1-9: 25
10. IF YOU CHECKED OFF ANY PROBLEMS, HOW DIFFICULT HAVE THESE PROBLEMS MADE IT FOR YOU TO DO YOUR WORK, TAKE CARE OF THINGS AT HOME, OR GET ALONG WITH OTHER PEOPLE: EXTREMELY DIFFICULT
2. FEELING DOWN, DEPRESSED OR HOPELESS: NEARLY EVERY DAY
SUM OF ALL RESPONSES TO PHQ QUESTIONS 1-9: 24
SUM OF ALL RESPONSES TO PHQ QUESTIONS 1-9: 25
SUM OF ALL RESPONSES TO PHQ9 QUESTIONS 1 & 2: 6
9. THOUGHTS THAT YOU WOULD BE BETTER OFF DEAD, OR OF HURTING YOURSELF: SEVERAL DAYS
7. TROUBLE CONCENTRATING ON THINGS, SUCH AS READING THE NEWSPAPER OR WATCHING TELEVISION: NEARLY EVERY DAY
8. MOVING OR SPEAKING SO SLOWLY THAT OTHER PEOPLE COULD HAVE NOTICED. OR THE OPPOSITE, BEING SO FIGETY OR RESTLESS THAT YOU HAVE BEEN MOVING AROUND A LOT MORE THAN USUAL: NEARLY EVERY DAY
3. TROUBLE FALLING OR STAYING ASLEEP: NEARLY EVERY DAY
5. POOR APPETITE OR OVEREATING: NEARLY EVERY DAY
7. TROUBLE CONCENTRATING ON THINGS, SUCH AS READING THE NEWSPAPER OR WATCHING TELEVISION: NEARLY EVERY DAY
6. FEELING BAD ABOUT YOURSELF - OR THAT YOU ARE A FAILURE OR HAVE LET YOURSELF OR YOUR FAMILY DOWN: NEARLY EVERY DAY
3. TROUBLE FALLING OR STAYING ASLEEP: NEARLY EVERY DAY

## 2024-12-16 ASSESSMENT — ANXIETY QUESTIONNAIRES
IF YOU CHECKED OFF ANY PROBLEMS ON THIS QUESTIONNAIRE, HOW DIFFICULT HAVE THESE PROBLEMS MADE IT FOR YOU TO DO YOUR WORK, TAKE CARE OF THINGS AT HOME, OR GET ALONG WITH OTHER PEOPLE: EXTREMELY DIFFICULT
7. FEELING AFRAID AS IF SOMETHING AWFUL MIGHT HAPPEN: NEARLY EVERY DAY
5. BEING SO RESTLESS THAT IT IS HARD TO SIT STILL: NEARLY EVERY DAY
3. WORRYING TOO MUCH ABOUT DIFFERENT THINGS: NEARLY EVERY DAY
6. BECOMING EASILY ANNOYED OR IRRITABLE: NEARLY EVERY DAY
6. BECOMING EASILY ANNOYED OR IRRITABLE: NEARLY EVERY DAY
1. FEELING NERVOUS, ANXIOUS, OR ON EDGE: NEARLY EVERY DAY
GAD7 TOTAL SCORE: 21
5. BEING SO RESTLESS THAT IT IS HARD TO SIT STILL: NEARLY EVERY DAY
1. FEELING NERVOUS, ANXIOUS, OR ON EDGE: NEARLY EVERY DAY
4. TROUBLE RELAXING: NEARLY EVERY DAY
7. FEELING AFRAID AS IF SOMETHING AWFUL MIGHT HAPPEN: NEARLY EVERY DAY
2. NOT BEING ABLE TO STOP OR CONTROL WORRYING: NEARLY EVERY DAY
4. TROUBLE RELAXING: NEARLY EVERY DAY
3. WORRYING TOO MUCH ABOUT DIFFERENT THINGS: NEARLY EVERY DAY
IF YOU CHECKED OFF ANY PROBLEMS ON THIS QUESTIONNAIRE, HOW DIFFICULT HAVE THESE PROBLEMS MADE IT FOR YOU TO DO YOUR WORK, TAKE CARE OF THINGS AT HOME, OR GET ALONG WITH OTHER PEOPLE: EXTREMELY DIFFICULT
2. NOT BEING ABLE TO STOP OR CONTROL WORRYING: NEARLY EVERY DAY

## 2024-12-16 NOTE — PROGRESS NOTES
[facility] [place] [facility]  [facility] PSYCHIATRY  [address] [address]  [phone]    Progress Note    Patient:  Sabina Bocanegra  YOB: 1990  PCP:  Jennyfer Butcher, FELIZ - CNP  Visit Date:  12/16/2024      CC: Follow up for medication management and psychotherapy for    Diagnosis Orders   1. PTSD (post-traumatic stress disorder)  FLUoxetine (PROZAC) 20 MG capsule      2. Social anxiety disorder        3. Unspecified mood (affective) disorder (HCC)  FLUoxetine (PROZAC) 20 MG capsule      4. Borderline personality disorder (HCC)                SUBJECTIVE:      Sabina Bocanegra, a 34 y.o. female presenting for a follow-up appointment to address ongoing anxiety and depression, which she reports are exacerbated by her current medical issues. She recently consulted with [facility] on December 13, 2024, where she was informed of a likely diagnosis of Psychogenic Non-Epileptic Seizures (PNES). I have not been able to review the neurology notes but she mentions that they suggested continuing her current care. The patient experiences frequent syncope episodes occurring multiple times daily. Additionally, she reports, [facility] mentioned the possibility of Postural Orthostatic Tachycardia Syndrome (POTS)    Her anxiety is notably intense, especially when leaving her home, and she attributes her inability to work to these ongoing health challenges, which contribute to her depressive symptoms. She reports having dreams about dying and, upon reviewing her [place]-Suicide Severity Rating Scale (C-SSRS), acknowledges intermittent thoughts of wishing she were not alive. However, she denies any suicidal planning or intent, stating firmly, \"I would never do it. If I have thoughts, I immediately think of my kids, and the thoughts go away.\" Her significant other owns guns, which are stored in a safe. I recommended that the guns be removed from the home or, at

## 2024-12-16 NOTE — PATIENT INSTRUCTIONS
Plan:  Sabina Bocanegra, it was nice seeing you today  Increase Prozac to 60mg daily  Continue all other medications  Continue with OP therapy  Follow up in 4 weeks      -Please take medications as prescribed  -Refrain from alcohol or drug use  -Seek emergency help via the emergency and/or calling 911 should symptoms become severe, worsen, or with other concerning symptoms.Go immediately to the emergency room and/or call 911 with any suicidal or homicidal ideations or if audio/visual hallucinations develop.   -Contact office with any questions or concerns.      Crisis phone numbers:  988-national suicide hotline, call or text  Pending sale to [facility], [facility] [phone].  [facility] [phone]  [facility] [phone].  [facility] [phone].  [facility] [phone].  [facility] [phone].

## 2024-12-19 ENCOUNTER — PATIENT MESSAGE (OUTPATIENT)
Dept: FAMILY MEDICINE CLINIC | Age: 34
End: 2024-12-19

## 2025-01-06 ASSESSMENT — PATIENT HEALTH QUESTIONNAIRE - PHQ9
9. THOUGHTS THAT YOU WOULD BE BETTER OFF DEAD, OR OF HURTING YOURSELF: NOT AT ALL
SUM OF ALL RESPONSES TO PHQ9 QUESTIONS 1 & 2: 4
4. FEELING TIRED OR HAVING LITTLE ENERGY: MORE THAN HALF THE DAYS
7. TROUBLE CONCENTRATING ON THINGS, SUCH AS READING THE NEWSPAPER OR WATCHING TELEVISION: NOT AT ALL
6. FEELING BAD ABOUT YOURSELF - OR THAT YOU ARE A FAILURE OR HAVE LET YOURSELF OR YOUR FAMILY DOWN: SEVERAL DAYS
3. TROUBLE FALLING OR STAYING ASLEEP: MORE THAN HALF THE DAYS
2. FEELING DOWN, DEPRESSED OR HOPELESS: NEARLY EVERY DAY
6. FEELING BAD ABOUT YOURSELF - OR THAT YOU ARE A FAILURE OR HAVE LET YOURSELF OR YOUR FAMILY DOWN: SEVERAL DAYS
7. TROUBLE CONCENTRATING ON THINGS, SUCH AS READING THE NEWSPAPER OR WATCHING TELEVISION: NOT AT ALL
3. TROUBLE FALLING OR STAYING ASLEEP: MORE THAN HALF THE DAYS
8. MOVING OR SPEAKING SO SLOWLY THAT OTHER PEOPLE COULD HAVE NOTICED. OR THE OPPOSITE - BEING SO FIDGETY OR RESTLESS THAT YOU HAVE BEEN MOVING AROUND A LOT MORE THAN USUAL: NEARLY EVERY DAY
2. FEELING DOWN, DEPRESSED OR HOPELESS: NEARLY EVERY DAY
SUM OF ALL RESPONSES TO PHQ QUESTIONS 1-9: 15
1. LITTLE INTEREST OR PLEASURE IN DOING THINGS: SEVERAL DAYS
SUM OF ALL RESPONSES TO PHQ QUESTIONS 1-9: 15
5. POOR APPETITE OR OVEREATING: NEARLY EVERY DAY
10. IF YOU CHECKED OFF ANY PROBLEMS, HOW DIFFICULT HAVE THESE PROBLEMS MADE IT FOR YOU TO DO YOUR WORK, TAKE CARE OF THINGS AT HOME, OR GET ALONG WITH OTHER PEOPLE: VERY DIFFICULT
1. LITTLE INTEREST OR PLEASURE IN DOING THINGS: SEVERAL DAYS
8. MOVING OR SPEAKING SO SLOWLY THAT OTHER PEOPLE COULD HAVE NOTICED. OR THE OPPOSITE, BEING SO FIGETY OR RESTLESS THAT YOU HAVE BEEN MOVING AROUND A LOT MORE THAN USUAL: NEARLY EVERY DAY
SUM OF ALL RESPONSES TO PHQ QUESTIONS 1-9: 15
10. IF YOU CHECKED OFF ANY PROBLEMS, HOW DIFFICULT HAVE THESE PROBLEMS MADE IT FOR YOU TO DO YOUR WORK, TAKE CARE OF THINGS AT HOME, OR GET ALONG WITH OTHER PEOPLE: VERY DIFFICULT
9. THOUGHTS THAT YOU WOULD BE BETTER OFF DEAD, OR OF HURTING YOURSELF: NOT AT ALL
SUM OF ALL RESPONSES TO PHQ QUESTIONS 1-9: 15
5. POOR APPETITE OR OVEREATING: NEARLY EVERY DAY
SUM OF ALL RESPONSES TO PHQ QUESTIONS 1-9: 15
4. FEELING TIRED OR HAVING LITTLE ENERGY: MORE THAN HALF THE DAYS

## 2025-01-08 ENCOUNTER — TELEPHONE (OUTPATIENT)
Dept: PSYCHIATRY | Age: 35
End: 2025-01-08

## 2025-01-08 ENCOUNTER — OFFICE VISIT (OUTPATIENT)
Dept: FAMILY MEDICINE CLINIC | Age: 35
End: 2025-01-08

## 2025-01-08 VITALS
RESPIRATION RATE: 16 BRPM | HEART RATE: 70 BPM | SYSTOLIC BLOOD PRESSURE: 138 MMHG | WEIGHT: 167.8 LBS | TEMPERATURE: 98 F | DIASTOLIC BLOOD PRESSURE: 90 MMHG | BODY MASS INDEX: 27.08 KG/M2

## 2025-01-08 DIAGNOSIS — M54.41 BILATERAL LOW BACK PAIN WITH RIGHT-SIDED SCIATICA, UNSPECIFIED CHRONICITY: ICD-10-CM

## 2025-01-08 DIAGNOSIS — M25.511 CHRONIC RIGHT SHOULDER PAIN: ICD-10-CM

## 2025-01-08 DIAGNOSIS — H91.93 HEARING PROBLEM OF BOTH EARS: ICD-10-CM

## 2025-01-08 DIAGNOSIS — F48.8 PSYCHOGENIC SYNCOPE: Primary | ICD-10-CM

## 2025-01-08 DIAGNOSIS — M54.6 CHRONIC THORACIC BACK PAIN, UNSPECIFIED BACK PAIN LATERALITY: ICD-10-CM

## 2025-01-08 DIAGNOSIS — F60.3 BORDERLINE PERSONALITY DISORDER (HCC): ICD-10-CM

## 2025-01-08 DIAGNOSIS — G89.29 CHRONIC RIGHT SHOULDER PAIN: ICD-10-CM

## 2025-01-08 DIAGNOSIS — F31.0 BIPOLAR I DISORDER, MOST RECENT EPISODE HYPOMANIC (HCC): ICD-10-CM

## 2025-01-08 DIAGNOSIS — G89.29 CHRONIC THORACIC BACK PAIN, UNSPECIFIED BACK PAIN LATERALITY: ICD-10-CM

## 2025-01-08 DIAGNOSIS — F41.1 GAD (GENERALIZED ANXIETY DISORDER): ICD-10-CM

## 2025-01-08 DIAGNOSIS — H93.8X3 SENSATION OF FULLNESS IN BOTH EARS: ICD-10-CM

## 2025-01-08 DIAGNOSIS — M54.2 NECK PAIN: ICD-10-CM

## 2025-01-08 DIAGNOSIS — R09.81 SINUS CONGESTION: ICD-10-CM

## 2025-01-08 RX ORDER — CELECOXIB 100 MG/1
100 CAPSULE ORAL 2 TIMES DAILY
Qty: 60 CAPSULE | Refills: 1 | Status: SHIPPED | OUTPATIENT
Start: 2025-01-08

## 2025-01-08 SDOH — ECONOMIC STABILITY: FOOD INSECURITY: WITHIN THE PAST 12 MONTHS, YOU WORRIED THAT YOUR FOOD WOULD RUN OUT BEFORE YOU GOT MONEY TO BUY MORE.: NEVER TRUE

## 2025-01-08 SDOH — ECONOMIC STABILITY: FOOD INSECURITY: WITHIN THE PAST 12 MONTHS, THE FOOD YOU BOUGHT JUST DIDN'T LAST AND YOU DIDN'T HAVE MONEY TO GET MORE.: NEVER TRUE

## 2025-01-08 ASSESSMENT — ENCOUNTER SYMPTOMS
SHORTNESS OF BREATH: 0
NAUSEA: 0
BLOOD IN STOOL: 0
BACK PAIN: 1
VOMITING: 0
CONSTIPATION: 0
DIARRHEA: 0

## 2025-01-08 NOTE — PROGRESS NOTES
Chief Complaint   Patient presents with    Follow-up     Pt here for 4-6wk f/u          SUBJECTIVE     Sabina Bocanegra is a 34 y.o.female    Arlen - Mom is here with her today.    Pt was diagnosed with psychogenic syncope by the neurologist at [facility]. Pt states [facility] neuro feels like this could be a POTS issue at well.     She has been to psychiatry recently. States that she was very suicidal recently. Prozac was increased to 60 mg and this is helping.  Still feels like she is passing out about 8-10 in a weeks time. Pt got a service puppy and this is helping.   Dr Cespedes wants to get her into trauma therapy.  Boyfriend does not want her to go back to work, or at least find a work from home job.  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  States appetite is still not great. Sets a reminder to eat.     Notes sinus congestion and trouble breathing through the nose. Has also had concerns about her hearing and fullness of ears.     Complains of Neck, back/spin pain every day. States foreign in clothes is painful at time. Mom has fibromyalgia and chronic pain.   Had exploratory surgery for broken scapula and other injuries from soccer and colliding with goalie and hitting goal post.     Pt has a history of early stage 2 cervical cancer. Pt did have a hysterectomy. Went to [facility] once monthly for a year. Is suppose to go to there yearly still but has not been there yearly since 2018.    Hx: ulcerative colitis. Was on bentyl in the past.     Review of Systems   Constitutional:  Negative for chills, diaphoresis and fever.   HENT:  Positive for congestion.    Respiratory:  Negative for shortness of breath.    Cardiovascular:  Negative for chest pain, palpitations and leg swelling.   Gastrointestinal:  Negative for blood in stool, constipation, diarrhea, nausea and vomiting.   Genitourinary:  Negative for dysuria and hematuria.   Musculoskeletal:  Positive for arthralgias,

## 2025-01-08 NOTE — TELEPHONE ENCOUNTER
Sabina,    I am glad to hear you are doing better. Lets plan to discuss this further at our visit next week.    Dr. Cespedes

## 2025-01-08 NOTE — TELEPHONE ENCOUNTER
Sabina wrote into the office via [employer]:    Hi there, big update! 60mg is a game changer. I am sleeping wonderfully minus some breathing issues( I will talk to Jennyfer on 1/08/25) but I am finding a routine again & jonas in cooking and art and just breathing and being alive. I do however want to go ahead and do the evaluation for autism & Adhd! My daughters are going through evaluations and I was asked to get a current diagnosis of both! So if at some time could we do that.   I learned about high functioning depression today from a Sandy Golden’s podcast. & I see where you have been coming from as far as running my self on empty.      Hope to hear from you soon.     Please advise.

## 2025-01-11 ENCOUNTER — PATIENT MESSAGE (OUTPATIENT)
Dept: FAMILY MEDICINE CLINIC | Age: 35
End: 2025-01-11

## 2025-01-11 DIAGNOSIS — F60.3 BORDERLINE PERSONALITY DISORDER (HCC): ICD-10-CM

## 2025-01-11 DIAGNOSIS — F43.12 CHRONIC POST-TRAUMATIC STRESS DISORDER (PTSD): ICD-10-CM

## 2025-01-11 DIAGNOSIS — F41.1 GAD (GENERALIZED ANXIETY DISORDER): ICD-10-CM

## 2025-01-13 DIAGNOSIS — F41.1 GAD (GENERALIZED ANXIETY DISORDER): ICD-10-CM

## 2025-01-13 DIAGNOSIS — F60.3 BORDERLINE PERSONALITY DISORDER (HCC): ICD-10-CM

## 2025-01-13 DIAGNOSIS — F43.12 CHRONIC POST-TRAUMATIC STRESS DISORDER (PTSD): ICD-10-CM

## 2025-01-13 RX ORDER — HYDROXYZINE HYDROCHLORIDE 50 MG/1
50-100 TABLET, FILM COATED ORAL NIGHTLY PRN
Qty: 60 TABLET | Refills: 2 | OUTPATIENT
Start: 2025-01-13

## 2025-01-13 RX ORDER — HYDROXYZINE HYDROCHLORIDE 50 MG/1
50-100 TABLET, FILM COATED ORAL NIGHTLY PRN
Qty: 60 TABLET | Refills: 5 | Status: SHIPPED | OUTPATIENT
Start: 2025-01-13

## 2025-01-13 NOTE — TELEPHONE ENCOUNTER
Sabina Bocanegra is requesting a refill on the following medications:  Requested Prescriptions     Pending Prescriptions Disp Refills    hydrOXYzine HCl (ATARAX) 50 MG tablet 60 tablet 2     Sig: Take 1-2 tablets by mouth nightly as needed for Anxiety (and insomnia)     Patient Comment: I have 4 left. Eileen been taking an extra one recently to help me sleep.   Date of last visit: 12/16/2024  Date of next visit (if applicable):1/16/2025  Pharmacy Name: Debra Laureano MA

## 2025-01-16 ENCOUNTER — TELEMEDICINE (OUTPATIENT)
Dept: PSYCHIATRY | Age: 35
End: 2025-01-16

## 2025-01-16 ENCOUNTER — OFFICE VISIT (OUTPATIENT)
Dept: PHYSICAL MEDICINE AND REHAB | Age: 35
End: 2025-01-16
Payer: COMMERCIAL

## 2025-01-16 VITALS
HEIGHT: 66 IN | SYSTOLIC BLOOD PRESSURE: 120 MMHG | WEIGHT: 167 LBS | DIASTOLIC BLOOD PRESSURE: 64 MMHG | BODY MASS INDEX: 26.84 KG/M2

## 2025-01-16 DIAGNOSIS — F41.1 GAD (GENERALIZED ANXIETY DISORDER): ICD-10-CM

## 2025-01-16 DIAGNOSIS — G89.29 CHRONIC BILATERAL LOW BACK PAIN, UNSPECIFIED WHETHER SCIATICA PRESENT: ICD-10-CM

## 2025-01-16 DIAGNOSIS — F39 UNSPECIFIED MOOD (AFFECTIVE) DISORDER (HCC): ICD-10-CM

## 2025-01-16 DIAGNOSIS — M79.10 MYALGIA: ICD-10-CM

## 2025-01-16 DIAGNOSIS — G89.29 CHRONIC RIGHT SHOULDER PAIN: ICD-10-CM

## 2025-01-16 DIAGNOSIS — F60.3 BORDERLINE PERSONALITY DISORDER (HCC): ICD-10-CM

## 2025-01-16 DIAGNOSIS — M54.50 CHRONIC BILATERAL LOW BACK PAIN, UNSPECIFIED WHETHER SCIATICA PRESENT: ICD-10-CM

## 2025-01-16 DIAGNOSIS — M54.2 NECK PAIN: ICD-10-CM

## 2025-01-16 DIAGNOSIS — M54.6 CHRONIC BILATERAL THORACIC BACK PAIN: ICD-10-CM

## 2025-01-16 DIAGNOSIS — R53.82 CHRONIC FATIGUE: Primary | ICD-10-CM

## 2025-01-16 DIAGNOSIS — G89.29 CHRONIC BILATERAL THORACIC BACK PAIN: ICD-10-CM

## 2025-01-16 DIAGNOSIS — M25.511 CHRONIC RIGHT SHOULDER PAIN: ICD-10-CM

## 2025-01-16 DIAGNOSIS — F43.10 PTSD (POST-TRAUMATIC STRESS DISORDER): Primary | ICD-10-CM

## 2025-01-16 DIAGNOSIS — M79.671 RIGHT FOOT PAIN: ICD-10-CM

## 2025-01-16 PROCEDURE — 99215 OFFICE O/P EST HI 40 MIN: CPT | Performed by: NURSE PRACTITIONER

## 2025-01-16 ASSESSMENT — PATIENT HEALTH QUESTIONNAIRE - PHQ9
5. POOR APPETITE OR OVEREATING: NEARLY EVERY DAY
SUM OF ALL RESPONSES TO PHQ QUESTIONS 1-9: 11
2. FEELING DOWN, DEPRESSED OR HOPELESS: SEVERAL DAYS
6. FEELING BAD ABOUT YOURSELF - OR THAT YOU ARE A FAILURE OR HAVE LET YOURSELF OR YOUR FAMILY DOWN: SEVERAL DAYS
7. TROUBLE CONCENTRATING ON THINGS, SUCH AS READING THE NEWSPAPER OR WATCHING TELEVISION: NOT AT ALL
3. TROUBLE FALLING OR STAYING ASLEEP: MORE THAN HALF THE DAYS
8. MOVING OR SPEAKING SO SLOWLY THAT OTHER PEOPLE COULD HAVE NOTICED. OR THE OPPOSITE, BEING SO FIGETY OR RESTLESS THAT YOU HAVE BEEN MOVING AROUND A LOT MORE THAN USUAL: SEVERAL DAYS
SUM OF ALL RESPONSES TO PHQ QUESTIONS 1-9: 11
10. IF YOU CHECKED OFF ANY PROBLEMS, HOW DIFFICULT HAVE THESE PROBLEMS MADE IT FOR YOU TO DO YOUR WORK, TAKE CARE OF THINGS AT HOME, OR GET ALONG WITH OTHER PEOPLE: SOMEWHAT DIFFICULT
1. LITTLE INTEREST OR PLEASURE IN DOING THINGS: NOT AT ALL
SUM OF ALL RESPONSES TO PHQ9 QUESTIONS 1 & 2: 1
SUM OF ALL RESPONSES TO PHQ QUESTIONS 1-9: 11
4. FEELING TIRED OR HAVING LITTLE ENERGY: NEARLY EVERY DAY
SUM OF ALL RESPONSES TO PHQ QUESTIONS 1-9: 11
9. THOUGHTS THAT YOU WOULD BE BETTER OFF DEAD, OR OF HURTING YOURSELF: NOT AT ALL

## 2025-01-16 ASSESSMENT — ANXIETY QUESTIONNAIRES
6. BECOMING EASILY ANNOYED OR IRRITABLE: NEARLY EVERY DAY
7. FEELING AFRAID AS IF SOMETHING AWFUL MIGHT HAPPEN: NOT AT ALL
3. WORRYING TOO MUCH ABOUT DIFFERENT THINGS: SEVERAL DAYS
4. TROUBLE RELAXING: NOT AT ALL
IF YOU CHECKED OFF ANY PROBLEMS ON THIS QUESTIONNAIRE, HOW DIFFICULT HAVE THESE PROBLEMS MADE IT FOR YOU TO DO YOUR WORK, TAKE CARE OF THINGS AT HOME, OR GET ALONG WITH OTHER PEOPLE: SOMEWHAT DIFFICULT
5. BEING SO RESTLESS THAT IT IS HARD TO SIT STILL: NEARLY EVERY DAY
GAD7 TOTAL SCORE: 12
2. NOT BEING ABLE TO STOP OR CONTROL WORRYING: MORE THAN HALF THE DAYS
1. FEELING NERVOUS, ANXIOUS, OR ON EDGE: NEARLY EVERY DAY
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
## 2025-01-16 NOTE — PROGRESS NOTES
[facility] [place] [facility]  [facility] NEUROSCIENCE AND REHABILITATION CENTER  [address]  [place] [address]  Dept: [phone]  Dept Fax: [phone]  Loc: [phone]    Visit Date: 1/16/2025    Sabina Bocanegra is a 34 y.o. female who is referred for pain management evaluation and treatment per Dr. Butcher.                CAGE and CAGE-AID Questions   1. In the last three months, have you felt you should cut down or stop drinking or using drugs?  Yes []        No [x]     2. In the last three months, has anyone annoyed you or gotten on your nerves by telling you to cut down or stop drinking or using drugs?  Yes []        No [x]     3. In the last three months, have you felt guilty or bad about how much you drink or use drugs?   Yes []        No [x]     4. In the last three months, have you been waking up wanting to have an alcoholic drink or use drugs?  Yes []        No [x]        Opioid Risk Tool:  Clinician Form       1. Family History of Substance Abuse: Female Male    Alcohol   []1   []3    Illegal drugs   []2   []3    Prescription drugs     []4   []4   2. Personal History of Substance Abuse:          Alcohol   []3   []3    Illegal drugs   []4   []4    Prescription drugs     []5   []5   3. Age (evelia box if between 16 and 45):     []1   []1   4. History of Preadolescent Sexual Abuse:     []3   []0   5. Psychological Disease:      Attention deficit disorder, obsessive-compulsive disorder, bipolar, schizophrenia   [x]2   []2      Depression     1   []1    Scoring Totals 3      Total Score  Low Risk  Moderate Risk  High Risk   Risk Category   0 - 3   4 - 7   8 or Above      Patient states symptoms interfere with:  A.  General Activity:  yes   B.  Mood: yes    C.  Walking Ability:   yes   D.  Normal Work (Includes both work outside the home and housework):   yes    E.  Relations with Other People:  yes   F.  Sleep:   yes   G. Enjoyment of Life:  yes

## 2025-01-16 NOTE — PATIENT INSTRUCTIONS
Plan:  Sabina Bocanegra, it was nice seeing you today  Continue current medications  Continue to look for outpatient therapist  Follow-up in 8 weeks  What is Functional Neurologic Disorder (FND)?  FND is a neurological condition characterized by symptoms related to the nervous system that are not caused by another neurological disease or structural brain damage. It involves changes in how brain networks work rather than changes in the brain's structure[2][3][5].  Symptoms  Symptoms are diverse and can include:  Weakness or paralysis  Abnormal movements (e.g., tremors, shaking, stiffness)  Seizure-like episodes (nonepileptic seizures)  Speech difficulties (e.g., stuttering, slurred speech)  Changes in sensation (e.g., numbness, loss of touch)  Vision or hearing problems  Cognitive difficulties (e.g., memory, concentration issues)  Dizziness  Extreme slowness and fatigue  Pain (including chronic migraine)[1][2][5].  Causes  The exact cause of FND is unknown, but it is often associated with changes in brain areas related to personal agency and emotional regulation. It can be triggered by stressful events or a history of emotional or physical trauma, though not all patients have such a history[1][3][4].  Diagnosis  Diagnosis is made through a detailed patient history and positive clinical signs. Brain imaging (MRI, CT scans) is usually normal. The diagnosis should be approached transparently and explained clearly to the patient[1][3][5].  Treatment  Treatment is multifaceted and includes:  Education about the condition  Psychotherapy to address underlying psychological issues  Physical, occupational, and speech therapy to address motor symptoms  Medications for associated conditions like anxiety, depression, or pain (but not anti-seizure medications for nonepileptic seizures)  Relaxation and mindfulness exercises  Hypnosis in some cases[1][3][4].  Key Points  Symptoms are real and not intentionally produced.  Early

## 2025-01-16 NOTE — PROGRESS NOTES
Functionality Assessment/Goals Worksheet     On a scale of 0 (Does not Interfere) to 10 (Completely Interferes)     1.  Which number describes how during the past week pain has interfered with           the following:  A.  General Activity:  8  B.  Mood: 7  C.  Walking Ability:  8  D.  Normal Work (Includes both work outside the home and housework):  8  E.  Relations with Other People:   8  F.  Sleep:   8  G.  Enjoyment of Life:   6    2.  Patient Prefers to Take their Pain Medications:     [x]  On a regular basis   []  Only when necessary    []  Does not take pain medications    3.  What are the Patient's Goals/Expectations for Visiting Pain Management?     []  Learn about my pain    [x]  Receive Medication   [x]  Physical Therapy     []  Treat Depression   [x]  Receive Injections    []  Treat Sleep   [x]  Deal with Anxiety and Stress   []  Treat Opoid Dependence/Addiction   []  Other:

## 2025-01-16 NOTE — PROGRESS NOTES
Normal Limits   Insight:  Age Appropriate   Judgment: Age Appropriate   Suicidal Ideations: Denies               1/16/2025     9:10 AM   PHQ-9    Little interest or pleasure in doing things 0   Feeling down, depressed, or hopeless 1   Trouble falling or staying asleep, or sleeping too much 2   Feeling tired or having little energy 3   Poor appetite or overeating 3   Feeling bad about yourself - or that you are a failure or have let yourself or your family down 1   Trouble concentrating on things, such as reading the newspaper or watching television 0   Moving or speaking so slowly that other people could have noticed. Or the opposite - being so fidgety or restless that you have been moving around a lot more than usual 1   Thoughts that you would be better off dead, or of hurting yourself in some way 0   PHQ-2 Score 1   PHQ-9 Total Score 11   If you checked off any problems, how difficult have these problems made it for you to do your work, take care of things at home, or get along with other people? 1         1/16/2025     9:11 AM 12/16/2024     9:29 AM 11/13/2024     7:59 AM   MARGO-7 SCREENING   Feeling nervous, anxious, or on edge Nearly every day Nearly every day Nearly every day   Not being able to stop or control worrying More than half the days Nearly every day Nearly every day   Worrying too much about different things Several days Nearly every day Nearly every day   Trouble relaxing Not at all Nearly every day Nearly every day   Being so restless that it is hard to sit still Nearly every day Nearly every day Nearly every day   Becoming easily annoyed or irritable Nearly every day Nearly every day More than half the days   Feeling afraid as if something awful might happen Not at all Nearly every day More than half the days   MARGO-7 Total Score 12 21 19   How difficult have these problems made it for you to do your work, take care of things at home, or get along with other people? Somewhat difficult Extremely

## 2025-01-22 ENCOUNTER — PATIENT MESSAGE (OUTPATIENT)
Dept: FAMILY MEDICINE CLINIC | Age: 35
End: 2025-01-22

## 2025-01-27 ENCOUNTER — TELEPHONE (OUTPATIENT)
Dept: PSYCHIATRY | Age: 35
End: 2025-01-27

## 2025-01-27 NOTE — TELEPHONE ENCOUNTER
Sabina wrote into the office via [employer]:      As of today, William and I are no longer together. I have packed all of my things and will be moving back to my parents tomorrow 1/25/25. I’m okay. 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

## 2025-01-29 DIAGNOSIS — G89.29 CHRONIC BILATERAL LOW BACK PAIN, UNSPECIFIED WHETHER SCIATICA PRESENT: ICD-10-CM

## 2025-01-29 DIAGNOSIS — M54.50 CHRONIC BILATERAL LOW BACK PAIN, UNSPECIFIED WHETHER SCIATICA PRESENT: ICD-10-CM

## 2025-01-29 DIAGNOSIS — M54.2 NECK PAIN: Primary | ICD-10-CM

## 2025-01-30 DIAGNOSIS — M54.2 NECK PAIN: ICD-10-CM

## 2025-01-30 DIAGNOSIS — G89.29 CHRONIC BILATERAL LOW BACK PAIN, UNSPECIFIED WHETHER SCIATICA PRESENT: ICD-10-CM

## 2025-01-30 DIAGNOSIS — G89.29 CHRONIC BILATERAL THORACIC BACK PAIN: ICD-10-CM

## 2025-01-30 DIAGNOSIS — M79.671 RIGHT FOOT PAIN: ICD-10-CM

## 2025-01-30 DIAGNOSIS — G89.29 CHRONIC RIGHT SHOULDER PAIN: ICD-10-CM

## 2025-01-30 DIAGNOSIS — M54.50 CHRONIC BILATERAL LOW BACK PAIN, UNSPECIFIED WHETHER SCIATICA PRESENT: ICD-10-CM

## 2025-01-30 DIAGNOSIS — M25.511 CHRONIC RIGHT SHOULDER PAIN: ICD-10-CM

## 2025-01-30 DIAGNOSIS — M54.6 CHRONIC BILATERAL THORACIC BACK PAIN: ICD-10-CM

## 2025-01-31 ENCOUNTER — TELEPHONE (OUTPATIENT)
Dept: PHYSICAL MEDICINE AND REHAB | Age: 35
End: 2025-01-31

## 2025-01-31 NOTE — TELEPHONE ENCOUNTER
So far - labs WNL. Waiting on JESSICA    Xrays - Right shoulder, right foot, thoracic spine WNL. Cervical - straightening of normal curve but likely related to positioning or muscle spasm. Lumbar - mild narrowing at L5/S1 disc space.     Will review at follow up.

## 2025-02-04 ENCOUNTER — TELEPHONE (OUTPATIENT)
Dept: PSYCHIATRY | Age: 35
End: 2025-02-04

## 2025-02-04 NOTE — TELEPHONE ENCOUNTER
Sabina wrote into the office via [employer]:      I reached out to Orion Mcgee in [place], he was my previous counselor. I am waiting to see if he’d take me back on as a patient.

## 2025-02-10 ENCOUNTER — TELEPHONE (OUTPATIENT)
Dept: FAMILY MEDICINE CLINIC | Age: 35
End: 2025-02-10

## 2025-02-10 DIAGNOSIS — F40.10 SOCIAL ANXIETY DISORDER: ICD-10-CM

## 2025-02-10 DIAGNOSIS — F41.1 GAD (GENERALIZED ANXIETY DISORDER): ICD-10-CM

## 2025-02-10 RX ORDER — FLUOXETINE 40 MG/1
40 CAPSULE ORAL DAILY
Qty: 30 CAPSULE | Refills: 1 | Status: SHIPPED | OUTPATIENT
Start: 2025-02-10

## 2025-02-10 RX ORDER — GABAPENTIN 300 MG/1
300 CAPSULE ORAL 4 TIMES DAILY
Qty: 120 CAPSULE | Refills: 1 | Status: SHIPPED | OUTPATIENT
Start: 2025-02-10 | End: 2025-04-11

## 2025-02-10 NOTE — TELEPHONE ENCOUNTER
Sabina called into the office leaving a  originally stating that she needs a refill on her Gabapentin 300mg and Prozac (only 40mg dose). Per chart she is showing refills so staff reached out; she said that she is no longer using [facility], has since moved to [place] and her prescriptions are showing 0 refills after the transfer of her prescriptions. Staff asked when she last filled her prescriptions; she said \"a month ago\".     Medications are pending your approval for a 30 day supply with 1 refills; please advise otherwise. She is scheduled to return on 03/14/25. Last seen on 01/16/25.

## 2025-02-10 NOTE — TELEPHONE ENCOUNTER
Pt called in stating that pt had a seizure last Friday that lasted approximately 5 minutes. Pt states that her mom noticed that she was twitching during the seizure and pt did lose consciousness. Pt is still dealing with brain fog, stuttering, and forgetfulness at this time.

## 2025-02-11 ENCOUNTER — PATIENT MESSAGE (OUTPATIENT)
Dept: FAMILY MEDICINE CLINIC | Age: 35
End: 2025-02-11

## 2025-02-11 ENCOUNTER — TELEPHONE (OUTPATIENT)
Dept: PSYCHIATRY | Age: 35
End: 2025-02-11

## 2025-02-11 DIAGNOSIS — F41.1 GAD (GENERALIZED ANXIETY DISORDER): ICD-10-CM

## 2025-02-11 RX ORDER — SERTRALINE HYDROCHLORIDE 100 MG/1
TABLET, FILM COATED ORAL
Refills: 0 | OUTPATIENT
Start: 2025-02-11

## 2025-02-11 NOTE — TELEPHONE ENCOUNTER
LM for patient to return call.  Need clarification on refill request.  Medication is not listed in chart as a current medication patient is taking.

## 2025-02-12 ENCOUNTER — OFFICE VISIT (OUTPATIENT)
Dept: PHYSICAL MEDICINE AND REHAB | Age: 35
End: 2025-02-12
Payer: COMMERCIAL

## 2025-02-12 VITALS
WEIGHT: 167 LBS | BODY MASS INDEX: 26.84 KG/M2 | HEIGHT: 66 IN | SYSTOLIC BLOOD PRESSURE: 126 MMHG | DIASTOLIC BLOOD PRESSURE: 78 MMHG

## 2025-02-12 DIAGNOSIS — G89.29 CHRONIC BILATERAL LOW BACK PAIN, UNSPECIFIED WHETHER SCIATICA PRESENT: Primary | ICD-10-CM

## 2025-02-12 DIAGNOSIS — G89.29 CHRONIC RIGHT SHOULDER PAIN: ICD-10-CM

## 2025-02-12 DIAGNOSIS — R53.82 CHRONIC FATIGUE: ICD-10-CM

## 2025-02-12 DIAGNOSIS — M25.511 CHRONIC RIGHT SHOULDER PAIN: ICD-10-CM

## 2025-02-12 DIAGNOSIS — M54.2 NECK PAIN: ICD-10-CM

## 2025-02-12 DIAGNOSIS — G89.29 CHRONIC BILATERAL THORACIC BACK PAIN: ICD-10-CM

## 2025-02-12 DIAGNOSIS — M54.50 CHRONIC BILATERAL LOW BACK PAIN, UNSPECIFIED WHETHER SCIATICA PRESENT: Primary | ICD-10-CM

## 2025-02-12 DIAGNOSIS — M79.671 RIGHT FOOT PAIN: ICD-10-CM

## 2025-02-12 DIAGNOSIS — M54.6 CHRONIC BILATERAL THORACIC BACK PAIN: ICD-10-CM

## 2025-02-12 DIAGNOSIS — M79.10 MYALGIA: ICD-10-CM

## 2025-02-12 PROCEDURE — 99214 OFFICE O/P EST MOD 30 MIN: CPT | Performed by: NURSE PRACTITIONER

## 2025-02-12 RX ORDER — PREGABALIN 75 MG/1
75 CAPSULE ORAL 2 TIMES DAILY
Qty: 60 CAPSULE | Refills: 0 | Status: SHIPPED | OUTPATIENT
Start: 2025-02-12 | End: 2025-03-14

## 2025-02-12 NOTE — PROGRESS NOTES
[facility] PROFESSIONAL SERVS  [facility] NEUROSCIENCE AND REHABILITATION [address]  [facility] [address]  Dept: [phone]  Dept Fax: [phone]  Loc: [phone]    Visit Date: 2/12/2025    Functionality Assessment/Goals Worksheet     On a scale of 0 (Does not Interfere) to 10 (Completely Interferes)     1.  Which number describes how during the past week pain has interfered with       the following:  A.  General Activity:  10  B.  Mood: 10  C.  Walking Ability:  8  D.  Normal Work (Includes both work outside the home and housework):  8  E.  Relations with Other People:   8  F.  Sleep:   10  G.  Enjoyment of Life:   10    2.  Patient Prefers to Take their Pain Medications:     []  On a regular basis   []  Only when necessary    []  Does not take pain medications    3.  What are the Patient's Goals/Expectations for Visiting Pain Management?     []  Learn about my pain    []  Receive Medication   []  Physical Therapy     []  Treat Depression   []  Receive Injections    []  Treat Sleep   []  Deal with Anxiety and Stress   []  Treat Opoid Dependence/Addiction   []  Other:  
hyperactivity disorder) 1998    Anxiety     Cancer (HCC) 2017    cervical    Chronic back pain 2008    Depression     Drug abuse (HCC)     Fibromyalgia 2012    GERD (gastroesophageal reflux disease) 2009    GI (gastrointestinal bleed) 2010    Headache 2005    Hearing loss 1998    Right ear    Irritable bowel syndrome 2009    Kidney stones     OCD (obsessive compulsive disorder)     Osteoarthritis 2018       Past Surgical History:   Procedure Laterality Date    APPENDECTOMY  2011    COLONOSCOPY      FRACTURE SURGERY  2019    right foot    HYSTERECTOMY (CERVIX STATUS UNKNOWN)      HYSTERECTOMY, TOTAL ABDOMINAL (CERVIX REMOVED)  2017    HYSTERECTOMY, VAGINAL  2017    SMALL INTESTINE SURGERY  1998    TUBAL LIGATION  2016    UPPER GASTROINTESTINAL ENDOSCOPY  2022       Family History   Problem Relation Age of Onset    Other Mother         Autoimmune disorders    Anemia Mother     Arthritis Mother     Asthma Mother     Cancer Mother         Uteren    Colon Cancer Mother         Early signs, caught it and had surgery. Hasnt come back    Depression Mother     Diabetes Mother         Type 2    Heart Attack Mother     High Blood Pressure Mother     High Cholesterol Mother     Immune Disorder Mother     Mental Illness Mother     Miscarriages / Stillbirths Mother     Obesity Mother     Stroke Mother     Vision Loss Mother     Arrhythmia Mother     Alcohol Abuse Father     Arthritis Father     Hearing Loss Father     Obesity Father     Substance Abuse Father         Alcohol    Unknown Father     Asthma Sister         Sports induced    Depression Sister     Mental Illness Sister     Miscarriages / Stillbirths Sister     Alcohol Abuse Brother     Asthma Brother     Depression Brother     Mental Illness Brother     Substance Abuse Brother         Alcohol    Hearing Loss Brother             Substance Abuse Brother         Alcohol    Vision Loss Brother     Depression Maternal Aunt     Mental Illness Maternal Aunt

## 2025-02-27 ENCOUNTER — HOSPITAL ENCOUNTER (OUTPATIENT)
Dept: MRI IMAGING | Age: 35
Discharge: HOME | End: 2025-02-27
Payer: COMMERCIAL

## 2025-02-27 DIAGNOSIS — M54.50 CHRONIC BILATERAL LOW BACK PAIN, UNSPECIFIED WHETHER SCIATICA PRESENT: ICD-10-CM

## 2025-02-27 DIAGNOSIS — G89.29 CHRONIC BILATERAL LOW BACK PAIN, UNSPECIFIED WHETHER SCIATICA PRESENT: ICD-10-CM

## 2025-02-27 PROCEDURE — 72148 MRI LUMBAR SPINE W/O DYE: CPT

## 2025-03-04 ENCOUNTER — TELEPHONE (OUTPATIENT)
Dept: PSYCHIATRY | Age: 35
End: 2025-03-04

## 2025-03-04 NOTE — TELEPHONE ENCOUNTER
This medication was prescribed by pain med. I would recommend she reach out to them if not already done. If they started this I would not want her taking Gabapentin at same time.    Dr. Cespedes

## 2025-03-04 NOTE — TELEPHONE ENCOUNTER
Sabina wrote into the office via [employer]; she labeled the message Nazia    Not a fan of it. Week 2 and the pain is back full swing leaving me bed ridden most nights of the day.     Please advise; she is scheduled to return on 03/14/25. Last seen on 01/16/25.

## 2025-03-05 NOTE — TELEPHONE ENCOUNTER
Sabina wrote back into the office via [email or web address]:    I spoke with Dr Prado. She said to go to one pill in the am for 3 days. Then do 3 gabas a day for 3 days and transition back to the 4 times a day     *Message sent to provider for review as well. Please advise anything further?

## 2025-03-11 ENCOUNTER — OFFICE VISIT (OUTPATIENT)
Dept: FAMILY MEDICINE CLINIC | Age: 35
End: 2025-03-11

## 2025-03-11 VITALS
RESPIRATION RATE: 16 BRPM | SYSTOLIC BLOOD PRESSURE: 102 MMHG | TEMPERATURE: 98 F | HEART RATE: 68 BPM | WEIGHT: 174.8 LBS | DIASTOLIC BLOOD PRESSURE: 70 MMHG | BODY MASS INDEX: 28.21 KG/M2

## 2025-03-11 DIAGNOSIS — H91.93 HEARING PROBLEM OF BOTH EARS: ICD-10-CM

## 2025-03-11 DIAGNOSIS — R51.9 DAILY HEADACHE: ICD-10-CM

## 2025-03-11 DIAGNOSIS — G89.4 CHRONIC PAIN DISORDER: ICD-10-CM

## 2025-03-11 DIAGNOSIS — R42 VERTIGO: ICD-10-CM

## 2025-03-11 DIAGNOSIS — G43.E09 CHRONIC MIGRAINE WITH AURA WITHOUT STATUS MIGRAINOSUS, NOT INTRACTABLE: ICD-10-CM

## 2025-03-11 DIAGNOSIS — T75.3XXA MOTION SICKNESS, INITIAL ENCOUNTER: ICD-10-CM

## 2025-03-11 DIAGNOSIS — F48.8 PSYCHOGENIC SYNCOPE: Primary | ICD-10-CM

## 2025-03-11 DIAGNOSIS — F41.1 GAD (GENERALIZED ANXIETY DISORDER): ICD-10-CM

## 2025-03-11 PROBLEM — F44.2: Status: ACTIVE | Noted: 2024-09-20

## 2025-03-11 RX ORDER — ONDANSETRON 4 MG/1
4 TABLET, ORALLY DISINTEGRATING ORAL 3 TIMES DAILY PRN
Qty: 21 TABLET | Refills: 1 | Status: SHIPPED | OUTPATIENT
Start: 2025-03-11

## 2025-03-11 RX ORDER — MECLIZINE HYDROCHLORIDE 25 MG/1
25 TABLET ORAL 3 TIMES DAILY PRN
Qty: 30 TABLET | Refills: 0 | Status: SHIPPED | OUTPATIENT
Start: 2025-03-11 | End: 2025-03-21

## 2025-03-11 RX ORDER — TOPIRAMATE 25 MG/1
25 TABLET, FILM COATED ORAL NIGHTLY
Qty: 30 TABLET | Refills: 1 | Status: SHIPPED | OUTPATIENT
Start: 2025-03-11

## 2025-03-11 RX ORDER — RIZATRIPTAN BENZOATE 10 MG/1
10 TABLET ORAL
Qty: 9 TABLET | Refills: 0 | Status: SHIPPED | OUTPATIENT
Start: 2025-03-11 | End: 2025-03-11

## 2025-03-11 ASSESSMENT — ENCOUNTER SYMPTOMS: BACK PAIN: 1

## 2025-03-11 NOTE — PROGRESS NOTES
Chief Complaint   Patient presents with    Other     Patient would like to discuss current health.   Diagnosed with vertigo since last OV.        SUBJECTIVE     Sabina Bocanegra is a 34 y.o.female      Here for follow up of chronic health problems including:    Patient Active Problem List   Diagnosis    Bipolar I disorder, most recent episode hypomanic (HCC)    Borderline personality disorder (HCC)    MARGO (generalized anxiety disorder)    Insomnia, persistent    Chronic post-traumatic stress disorder (PTSD)    Bipolar I disorder, most recent episode (or current) depressed (HCC)    Social anxiety disorder    Chronic fatigue    Unspecified mood (affective) disorder    Catalepsy    Chronic pain disorder    Psychogenic syncope   Has a referral to Dr Reed in [place] for POTS evaluation.   Neurology - ordered EMG autonomic testing  History of Present Illness  The patient is a 34-year-old female who presents for evaluation of migraines, sleep issues, dizziness, hearing loss, vision loss, gastrointestinal issues, anxiety and depression, seizures and syncope, and chronic pain. She is accompanied by her mother.    She experiences daily migraines, which are managed with Tylenol. She has previously tried Maxalt, Imitrex, and Toradol, all of which provided relief but she does not have any current prescriptions for these. She also reports experiencing aura symptoms, including spots and light sensitivity, and describes her headaches as pulsating in nature.     She reports difficulty sleeping, often waking up multiple times during the night. She typically goes to bed between 9:00 PM and 10:00 PM but wakes up at 1:00 AM, 2:30 AM, 3:30 AM, 4:00 AM, and 5:00 AM. After waking up at 5:00 AM, she remains awake for the rest of the day. She has a follow-up appointment with Dr. Cespedes on 03/14/2025. She has been prescribed hydroxyzine for anxiety and sleep, taking 2 to 3 tablets as needed. She has also tried trazodone, which she found

## 2025-03-13 ASSESSMENT — ENCOUNTER SYMPTOMS
DIARRHEA: 0
CONSTIPATION: 0
VOMITING: 0
BLOOD IN STOOL: 0
SHORTNESS OF BREATH: 0
NAUSEA: 0

## 2025-03-14 ENCOUNTER — TELEMEDICINE (OUTPATIENT)
Dept: PSYCHIATRY | Age: 35
End: 2025-03-14
Payer: COMMERCIAL

## 2025-03-14 DIAGNOSIS — G47.00 INSOMNIA, PERSISTENT: Primary | ICD-10-CM

## 2025-03-14 DIAGNOSIS — F43.12 CHRONIC POST-TRAUMATIC STRESS DISORDER (PTSD): ICD-10-CM

## 2025-03-14 DIAGNOSIS — F41.1 GAD (GENERALIZED ANXIETY DISORDER): ICD-10-CM

## 2025-03-14 DIAGNOSIS — F60.3 BORDERLINE PERSONALITY DISORDER (HCC): ICD-10-CM

## 2025-03-14 PROCEDURE — 90833 PSYTX W PT W E/M 30 MIN: CPT | Performed by: PSYCHIATRY & NEUROLOGY

## 2025-03-14 PROCEDURE — 99214 OFFICE O/P EST MOD 30 MIN: CPT | Performed by: PSYCHIATRY & NEUROLOGY

## 2025-03-14 RX ORDER — FLUOXETINE HYDROCHLORIDE 40 MG/1
CAPSULE ORAL DAILY
Qty: 90 CAPSULE | Refills: 1 | OUTPATIENT
Start: 2025-03-14

## 2025-03-14 RX ORDER — DOXEPIN HYDROCHLORIDE 10 MG/1
10 CAPSULE ORAL NIGHTLY
Qty: 30 CAPSULE | Refills: 2 | Status: SHIPPED | OUTPATIENT
Start: 2025-03-14

## 2025-03-14 ASSESSMENT — PATIENT HEALTH QUESTIONNAIRE - PHQ9
10. IF YOU CHECKED OFF ANY PROBLEMS, HOW DIFFICULT HAVE THESE PROBLEMS MADE IT FOR YOU TO DO YOUR WORK, TAKE CARE OF THINGS AT HOME, OR GET ALONG WITH OTHER PEOPLE: VERY DIFFICULT
SUM OF ALL RESPONSES TO PHQ QUESTIONS 1-9: 10
2. FEELING DOWN, DEPRESSED OR HOPELESS: NOT AT ALL
9. THOUGHTS THAT YOU WOULD BE BETTER OFF DEAD, OR OF HURTING YOURSELF: NOT AT ALL
5. POOR APPETITE OR OVEREATING: NEARLY EVERY DAY
6. FEELING BAD ABOUT YOURSELF - OR THAT YOU ARE A FAILURE OR HAVE LET YOURSELF OR YOUR FAMILY DOWN: NOT AT ALL
9. THOUGHTS THAT YOU WOULD BE BETTER OFF DEAD, OR OF HURTING YOURSELF: NOT AT ALL
7. TROUBLE CONCENTRATING ON THINGS, SUCH AS READING THE NEWSPAPER OR WATCHING TELEVISION: SEVERAL DAYS
2. FEELING DOWN, DEPRESSED OR HOPELESS: NOT AT ALL
1. LITTLE INTEREST OR PLEASURE IN DOING THINGS: SEVERAL DAYS
1. LITTLE INTEREST OR PLEASURE IN DOING THINGS: SEVERAL DAYS
3. TROUBLE FALLING OR STAYING ASLEEP: MORE THAN HALF THE DAYS
4. FEELING TIRED OR HAVING LITTLE ENERGY: NEARLY EVERY DAY
8. MOVING OR SPEAKING SO SLOWLY THAT OTHER PEOPLE COULD HAVE NOTICED. OR THE OPPOSITE - BEING SO FIDGETY OR RESTLESS THAT YOU HAVE BEEN MOVING AROUND A LOT MORE THAN USUAL: NOT AT ALL
5. POOR APPETITE OR OVEREATING: NEARLY EVERY DAY
10. IF YOU CHECKED OFF ANY PROBLEMS, HOW DIFFICULT HAVE THESE PROBLEMS MADE IT FOR YOU TO DO YOUR WORK, TAKE CARE OF THINGS AT HOME, OR GET ALONG WITH OTHER PEOPLE: VERY DIFFICULT
SUM OF ALL RESPONSES TO PHQ QUESTIONS 1-9: 10
6. FEELING BAD ABOUT YOURSELF - OR THAT YOU ARE A FAILURE OR HAVE LET YOURSELF OR YOUR FAMILY DOWN: NOT AT ALL
3. TROUBLE FALLING OR STAYING ASLEEP: MORE THAN HALF THE DAYS
SUM OF ALL RESPONSES TO PHQ QUESTIONS 1-9: 10
7. TROUBLE CONCENTRATING ON THINGS, SUCH AS READING THE NEWSPAPER OR WATCHING TELEVISION: SEVERAL DAYS
8. MOVING OR SPEAKING SO SLOWLY THAT OTHER PEOPLE COULD HAVE NOTICED. OR THE OPPOSITE, BEING SO FIGETY OR RESTLESS THAT YOU HAVE BEEN MOVING AROUND A LOT MORE THAN USUAL: NOT AT ALL
4. FEELING TIRED OR HAVING LITTLE ENERGY: NEARLY EVERY DAY

## 2025-03-14 ASSESSMENT — ANXIETY QUESTIONNAIRES
2. NOT BEING ABLE TO STOP OR CONTROL WORRYING: NEARLY EVERY DAY
3. WORRYING TOO MUCH ABOUT DIFFERENT THINGS: NOT AT ALL
6. BECOMING EASILY ANNOYED OR IRRITABLE: NOT AT ALL
GAD7 TOTAL SCORE: 6
5. BEING SO RESTLESS THAT IT IS HARD TO SIT STILL: NOT AT ALL
7. FEELING AFRAID AS IF SOMETHING AWFUL MIGHT HAPPEN: NOT AT ALL
1. FEELING NERVOUS, ANXIOUS, OR ON EDGE: NEARLY EVERY DAY
2. NOT BEING ABLE TO STOP OR CONTROL WORRYING: NEARLY EVERY DAY
4. TROUBLE RELAXING: NOT AT ALL
5. BEING SO RESTLESS THAT IT IS HARD TO SIT STILL: NOT AT ALL
1. FEELING NERVOUS, ANXIOUS, OR ON EDGE: NEARLY EVERY DAY
4. TROUBLE RELAXING: NOT AT ALL
IF YOU CHECKED OFF ANY PROBLEMS ON THIS QUESTIONNAIRE, HOW DIFFICULT HAVE THESE PROBLEMS MADE IT FOR YOU TO DO YOUR WORK, TAKE CARE OF THINGS AT HOME, OR GET ALONG WITH OTHER PEOPLE: SOMEWHAT DIFFICULT
6. BECOMING EASILY ANNOYED OR IRRITABLE: NOT AT ALL
IF YOU CHECKED OFF ANY PROBLEMS ON THIS QUESTIONNAIRE, HOW DIFFICULT HAVE THESE PROBLEMS MADE IT FOR YOU TO DO YOUR WORK, TAKE CARE OF THINGS AT HOME, OR GET ALONG WITH OTHER PEOPLE: SOMEWHAT DIFFICULT
3. WORRYING TOO MUCH ABOUT DIFFERENT THINGS: NOT AT ALL
7. FEELING AFRAID AS IF SOMETHING AWFUL MIGHT HAPPEN: NOT AT ALL

## 2025-03-14 NOTE — PROGRESS NOTES
of things at home, or get along with other people? Somewhat difficult Somewhat difficult Extremely difficult       Patient-reported           ICD-10-CM    1. Insomnia, persistent  G47.00 doxepin (SINEQUAN) 10 MG capsule      2. MARGO (generalized anxiety disorder)  F41.1       3. Chronic post-traumatic stress disorder (PTSD)  F43.12       4. Borderline personality disorder (HCC)  F60.3               ASSESSMENT:  Patient continues to struggle with medical issues limiting her ability to work.  She seems to be handling the break-up well and currently has a safe place to live with her parents.  She is hopeful that evaluation by POTS specialist will be helpful but frustrated that it is going to be 2 years until she can see them.  Sleep is broken up but she is able to fall back asleep.  Seems like most of the issues are with sleep maintenance and we elected to try doxepin to see if this can help.  I did discuss that this could worsen Pat so to monitor closely for worsening symptoms.  Patient was in agreement with plan.    There was no indication of imminent danger to self or others based on today's exam; outpatient care is appropriate. Modifiable risk factors are being addressed. Safety plan is in place.  Patient/Family aware of how to reach me/covering clinician and members of treatment team in case of emergency and that mychart message is for non-urgent logistical matters only such as scheduling or non urgent questions. Advised to call 911 or go to the nearest emergency room should any acute safety concerns arise while awaiting a call back. Patient is aware of the 988 Crisis Line as well if needing additional support.    PLAN:     Medications:   Start doxepin 10 mg nightly as needed for sleep  Prozac 60 mg daily  Gabapentin 300 mg 4 times daily  Hydroxyzine 50 to 100 mg nightly as needed for sleep  Risks, benefits and alternatives discussed.   Consider in the future: We will consider other medication changes once syncopal

## 2025-03-14 NOTE — PATIENT INSTRUCTIONS
Plan:  Sabina Bocanegra, it was nice seeing you today  Start doxepin 10 mg nightly as needed for sleep.  Monitor for side effects  Continue all other medications  Follow-up in 3 months but keep me updated      -Please take medications as prescribed  -Refrain from alcohol or drug use  -Seek emergency help via the emergency and/or calling 911 should symptoms become severe, worsen, or with other concerning symptoms.Go immediately to the emergency room and/or call 911 with any suicidal or homicidal ideations or if audio/visual hallucinations develop.   -Contact office with any questions or concerns.      Crisis phone numbers:  988-national suicide hotline, call or text  [facility] [phone].  [facility] [phone]  [facility] [phone].  [facility] [phone].  [facility] [phone].  [facility] [phone].

## 2025-03-18 ENCOUNTER — PATIENT MESSAGE (OUTPATIENT)
Dept: FAMILY MEDICINE CLINIC | Age: 35
End: 2025-03-18

## 2025-03-18 ENCOUNTER — TELEPHONE (OUTPATIENT)
Dept: PSYCHIATRY | Age: 35
End: 2025-03-18

## 2025-03-18 ENCOUNTER — PATIENT MESSAGE (OUTPATIENT)
Dept: PHYSICAL MEDICINE AND REHAB | Age: 35
End: 2025-03-18

## 2025-03-18 NOTE — TELEPHONE ENCOUNTER
Sabina wrote into the office via [employer]:      So I was informed that I’ve exhausted my short term disability. I went ahead and quit my job because I don’t be released to work anytime soon. So I’m losing my insurance and I was denied SSI.     Per chart; she is scheduled to return on 06/16/25?

## 2025-03-18 NOTE — TELEPHONE ENCOUNTER
Sabina,    I am sorry to hear this. If you have not reached out to medicaid I would do so as you may qualify since you are currently unable to work. I can continue to see you even without insurance but understand the cost may be prohibitive. If you would like we can send you a list of local community mental health agencies that may work on sliding scale. I also believe that [facility] has some patient assistance.    Dr. Cespedes

## 2025-04-07 ENCOUNTER — TELEPHONE (OUTPATIENT)
Dept: PSYCHIATRY | Age: 35
End: 2025-04-07

## 2025-04-07 ASSESSMENT — PATIENT HEALTH QUESTIONNAIRE - PHQ9
1. LITTLE INTEREST OR PLEASURE IN DOING THINGS: NOT AT ALL
2. FEELING DOWN, DEPRESSED OR HOPELESS: SEVERAL DAYS
7. TROUBLE CONCENTRATING ON THINGS, SUCH AS READING THE NEWSPAPER OR WATCHING TELEVISION: NOT AT ALL
9. THOUGHTS THAT YOU WOULD BE BETTER OFF DEAD, OR OF HURTING YOURSELF: NOT AT ALL
8. MOVING OR SPEAKING SO SLOWLY THAT OTHER PEOPLE COULD HAVE NOTICED. OR THE OPPOSITE - BEING SO FIDGETY OR RESTLESS THAT YOU HAVE BEEN MOVING AROUND A LOT MORE THAN USUAL: MORE THAN HALF THE DAYS
8. MOVING OR SPEAKING SO SLOWLY THAT OTHER PEOPLE COULD HAVE NOTICED. OR THE OPPOSITE, BEING SO FIGETY OR RESTLESS THAT YOU HAVE BEEN MOVING AROUND A LOT MORE THAN USUAL: MORE THAN HALF THE DAYS
10. IF YOU CHECKED OFF ANY PROBLEMS, HOW DIFFICULT HAVE THESE PROBLEMS MADE IT FOR YOU TO DO YOUR WORK, TAKE CARE OF THINGS AT HOME, OR GET ALONG WITH OTHER PEOPLE: EXTREMELY DIFFICULT
4. FEELING TIRED OR HAVING LITTLE ENERGY: NEARLY EVERY DAY
7. TROUBLE CONCENTRATING ON THINGS, SUCH AS READING THE NEWSPAPER OR WATCHING TELEVISION: NOT AT ALL
4. FEELING TIRED OR HAVING LITTLE ENERGY: NEARLY EVERY DAY
SUM OF ALL RESPONSES TO PHQ QUESTIONS 1-9: 12
6. FEELING BAD ABOUT YOURSELF - OR THAT YOU ARE A FAILURE OR HAVE LET YOURSELF OR YOUR FAMILY DOWN: NOT AT ALL
6. FEELING BAD ABOUT YOURSELF - OR THAT YOU ARE A FAILURE OR HAVE LET YOURSELF OR YOUR FAMILY DOWN: NOT AT ALL
9. THOUGHTS THAT YOU WOULD BE BETTER OFF DEAD, OR OF HURTING YOURSELF: NOT AT ALL
SUM OF ALL RESPONSES TO PHQ QUESTIONS 1-9: 12
1. LITTLE INTEREST OR PLEASURE IN DOING THINGS: NOT AT ALL
3. TROUBLE FALLING OR STAYING ASLEEP: NEARLY EVERY DAY
2. FEELING DOWN, DEPRESSED OR HOPELESS: SEVERAL DAYS
SUM OF ALL RESPONSES TO PHQ QUESTIONS 1-9: 12
10. IF YOU CHECKED OFF ANY PROBLEMS, HOW DIFFICULT HAVE THESE PROBLEMS MADE IT FOR YOU TO DO YOUR WORK, TAKE CARE OF THINGS AT HOME, OR GET ALONG WITH OTHER PEOPLE: EXTREMELY DIFFICULT
3. TROUBLE FALLING OR STAYING ASLEEP: NEARLY EVERY DAY
SUM OF ALL RESPONSES TO PHQ QUESTIONS 1-9: 12
SUM OF ALL RESPONSES TO PHQ QUESTIONS 1-9: 12
5. POOR APPETITE OR OVEREATING: NEARLY EVERY DAY
5. POOR APPETITE OR OVEREATING: NEARLY EVERY DAY

## 2025-04-07 ASSESSMENT — ANXIETY QUESTIONNAIRES
2. NOT BEING ABLE TO STOP OR CONTROL WORRYING: SEVERAL DAYS
4. TROUBLE RELAXING: NEARLY EVERY DAY
4. TROUBLE RELAXING: NEARLY EVERY DAY
6. BECOMING EASILY ANNOYED OR IRRITABLE: SEVERAL DAYS
4. TROUBLE RELAXING: NEARLY EVERY DAY
IF YOU CHECKED OFF ANY PROBLEMS ON THIS QUESTIONNAIRE, HOW DIFFICULT HAVE THESE PROBLEMS MADE IT FOR YOU TO DO YOUR WORK, TAKE CARE OF THINGS AT HOME, OR GET ALONG WITH OTHER PEOPLE: VERY DIFFICULT
7. FEELING AFRAID AS IF SOMETHING AWFUL MIGHT HAPPEN: NOT AT ALL
6. BECOMING EASILY ANNOYED OR IRRITABLE: SEVERAL DAYS
7. FEELING AFRAID AS IF SOMETHING AWFUL MIGHT HAPPEN: NOT AT ALL
3. WORRYING TOO MUCH ABOUT DIFFERENT THINGS: SEVERAL DAYS
3. WORRYING TOO MUCH ABOUT DIFFERENT THINGS: SEVERAL DAYS
5. BEING SO RESTLESS THAT IT IS HARD TO SIT STILL: NEARLY EVERY DAY
1. FEELING NERVOUS, ANXIOUS, OR ON EDGE: MORE THAN HALF THE DAYS
6. BECOMING EASILY ANNOYED OR IRRITABLE: SEVERAL DAYS
3. WORRYING TOO MUCH ABOUT DIFFERENT THINGS: SEVERAL DAYS
1. FEELING NERVOUS, ANXIOUS, OR ON EDGE: MORE THAN HALF THE DAYS
IF YOU CHECKED OFF ANY PROBLEMS ON THIS QUESTIONNAIRE, HOW DIFFICULT HAVE THESE PROBLEMS MADE IT FOR YOU TO DO YOUR WORK, TAKE CARE OF THINGS AT HOME, OR GET ALONG WITH OTHER PEOPLE: VERY DIFFICULT
2. NOT BEING ABLE TO STOP OR CONTROL WORRYING: SEVERAL DAYS
GAD7 TOTAL SCORE: 11
2. NOT BEING ABLE TO STOP OR CONTROL WORRYING: SEVERAL DAYS
IF YOU CHECKED OFF ANY PROBLEMS ON THIS QUESTIONNAIRE, HOW DIFFICULT HAVE THESE PROBLEMS MADE IT FOR YOU TO DO YOUR WORK, TAKE CARE OF THINGS AT HOME, OR GET ALONG WITH OTHER PEOPLE: VERY DIFFICULT
5. BEING SO RESTLESS THAT IT IS HARD TO SIT STILL: NEARLY EVERY DAY
7. FEELING AFRAID AS IF SOMETHING AWFUL MIGHT HAPPEN: NOT AT ALL
5. BEING SO RESTLESS THAT IT IS HARD TO SIT STILL: NEARLY EVERY DAY
1. FEELING NERVOUS, ANXIOUS, OR ON EDGE: MORE THAN HALF THE DAYS
GAD7 TOTAL SCORE: 11

## 2025-04-07 NOTE — TELEPHONE ENCOUNTER
Sabina wrote into the office via [employer]:    Hi there!   So I just wanted to update on that new medication, at first it helped. But now I am still waking up between 2-5am. I also do t know if the hydroxzine is working anymore. I believe we may need to adjust for sleep sake. I’m am truly unmotivated through out the day because I’m tired and in so much pain. If we could that’d be great.   Thanks   Sabina    Please advise; she is scheduled to return on 06/16/25. Per chart she was seen on 03/14/25 and started on Doxepin.

## 2025-04-08 ENCOUNTER — LAB (OUTPATIENT)
Dept: LAB | Age: 35
End: 2025-04-08

## 2025-04-08 ENCOUNTER — OFFICE VISIT (OUTPATIENT)
Dept: PHYSICAL MEDICINE AND REHAB | Age: 35
End: 2025-04-08

## 2025-04-08 ENCOUNTER — TELEPHONE (OUTPATIENT)
Dept: FAMILY MEDICINE CLINIC | Age: 35
End: 2025-04-08

## 2025-04-08 ENCOUNTER — OFFICE VISIT (OUTPATIENT)
Dept: PSYCHIATRY | Age: 35
End: 2025-04-08

## 2025-04-08 ENCOUNTER — OFFICE VISIT (OUTPATIENT)
Dept: FAMILY MEDICINE CLINIC | Age: 35
End: 2025-04-08

## 2025-04-08 ENCOUNTER — PATIENT MESSAGE (OUTPATIENT)
Dept: FAMILY MEDICINE CLINIC | Age: 35
End: 2025-04-08

## 2025-04-08 ENCOUNTER — RESULTS FOLLOW-UP (OUTPATIENT)
Dept: PHYSICAL MEDICINE AND REHAB | Age: 35
End: 2025-04-08

## 2025-04-08 VITALS
BODY MASS INDEX: 30.7 KG/M2 | DIASTOLIC BLOOD PRESSURE: 92 MMHG | HEIGHT: 66 IN | SYSTOLIC BLOOD PRESSURE: 122 MMHG | WEIGHT: 191 LBS

## 2025-04-08 VITALS
DIASTOLIC BLOOD PRESSURE: 78 MMHG | HEIGHT: 66 IN | TEMPERATURE: 98.3 F | SYSTOLIC BLOOD PRESSURE: 122 MMHG | RESPIRATION RATE: 16 BRPM | HEART RATE: 76 BPM | BODY MASS INDEX: 30.79 KG/M2 | WEIGHT: 191.6 LBS

## 2025-04-08 DIAGNOSIS — M54.2 NECK PAIN: ICD-10-CM

## 2025-04-08 DIAGNOSIS — M25.511 CHRONIC RIGHT SHOULDER PAIN: ICD-10-CM

## 2025-04-08 DIAGNOSIS — G89.29 CHRONIC BILATERAL THORACIC BACK PAIN: ICD-10-CM

## 2025-04-08 DIAGNOSIS — M54.50 CHRONIC BILATERAL LOW BACK PAIN, UNSPECIFIED WHETHER SCIATICA PRESENT: Primary | ICD-10-CM

## 2025-04-08 DIAGNOSIS — F48.8 PSYCHOGENIC SYNCOPE: Primary | ICD-10-CM

## 2025-04-08 DIAGNOSIS — M54.6 CHRONIC BILATERAL THORACIC BACK PAIN: ICD-10-CM

## 2025-04-08 DIAGNOSIS — R53.82 CHRONIC FATIGUE: ICD-10-CM

## 2025-04-08 DIAGNOSIS — G43.909 MIGRAINE WITHOUT STATUS MIGRAINOSUS, NOT INTRACTABLE, UNSPECIFIED MIGRAINE TYPE: ICD-10-CM

## 2025-04-08 DIAGNOSIS — G89.29 CHRONIC BILATERAL LOW BACK PAIN, UNSPECIFIED WHETHER SCIATICA PRESENT: Primary | ICD-10-CM

## 2025-04-08 DIAGNOSIS — M79.671 RIGHT FOOT PAIN: ICD-10-CM

## 2025-04-08 DIAGNOSIS — G47.00 INSOMNIA, PERSISTENT: ICD-10-CM

## 2025-04-08 DIAGNOSIS — G43.E09 CHRONIC MIGRAINE WITH AURA WITHOUT STATUS MIGRAINOSUS, NOT INTRACTABLE: Primary | ICD-10-CM

## 2025-04-08 DIAGNOSIS — G89.29 CHRONIC BILATERAL LOW BACK PAIN, UNSPECIFIED WHETHER SCIATICA PRESENT: ICD-10-CM

## 2025-04-08 DIAGNOSIS — F41.1 GAD (GENERALIZED ANXIETY DISORDER): ICD-10-CM

## 2025-04-08 DIAGNOSIS — G89.29 CHRONIC RIGHT SHOULDER PAIN: ICD-10-CM

## 2025-04-08 DIAGNOSIS — G43.E09 CHRONIC MIGRAINE WITH AURA WITHOUT STATUS MIGRAINOSUS, NOT INTRACTABLE: ICD-10-CM

## 2025-04-08 DIAGNOSIS — M54.50 LOW BACK PAIN, UNSPECIFIED BACK PAIN LATERALITY, UNSPECIFIED CHRONICITY, UNSPECIFIED WHETHER SCIATICA PRESENT: ICD-10-CM

## 2025-04-08 DIAGNOSIS — R42 VERTIGO: ICD-10-CM

## 2025-04-08 DIAGNOSIS — G90.A POTS (POSTURAL ORTHOSTATIC TACHYCARDIA SYNDROME): Primary | ICD-10-CM

## 2025-04-08 DIAGNOSIS — M54.50 CHRONIC BILATERAL LOW BACK PAIN, UNSPECIFIED WHETHER SCIATICA PRESENT: ICD-10-CM

## 2025-04-08 DIAGNOSIS — F43.12 CHRONIC POST-TRAUMATIC STRESS DISORDER (PTSD): ICD-10-CM

## 2025-04-08 DIAGNOSIS — R42 DIZZINESS: ICD-10-CM

## 2025-04-08 DIAGNOSIS — M79.2 NEUROPATHIC PAIN: ICD-10-CM

## 2025-04-08 DIAGNOSIS — F60.3 BORDERLINE PERSONALITY DISORDER (HCC): ICD-10-CM

## 2025-04-08 DIAGNOSIS — F39 UNSPECIFIED MOOD (AFFECTIVE) DISORDER: Primary | ICD-10-CM

## 2025-04-08 DIAGNOSIS — M79.10 MYALGIA: ICD-10-CM

## 2025-04-08 LAB
BASOPHILS ABSOLUTE: 0 THOU/MM3 (ref 0–0.1)
BASOPHILS NFR BLD AUTO: 0.4 %
CRP SERPL-MCNC: < 0.3 MG/DL (ref 0–0.5)
DEPRECATED RDW RBC AUTO: 42.9 FL (ref 35–45)
EOSINOPHIL NFR BLD AUTO: 1 %
EOSINOPHILS ABSOLUTE: 0 THOU/MM3 (ref 0–0.4)
ERYTHROCYTE [DISTWIDTH] IN BLOOD BY AUTOMATED COUNT: 12.7 % (ref 11.5–14.5)
ERYTHROCYTE [SEDIMENTATION RATE] IN BLOOD BY WESTERGREN METHOD: 12 MM/HR (ref 0–20)
HCT VFR BLD AUTO: 36.5 % (ref 37–47)
HGB BLD-MCNC: 12.4 GM/DL (ref 12–16)
IMM GRANULOCYTES # BLD AUTO: 0.01 THOU/MM3 (ref 0–0.07)
IMM GRANULOCYTES NFR BLD AUTO: 0.2 %
LYMPHOCYTES ABSOLUTE: 1.5 THOU/MM3 (ref 1–4.8)
LYMPHOCYTES NFR BLD AUTO: 30.4 %
MCH RBC QN AUTO: 31.3 PG (ref 26–33)
MCHC RBC AUTO-ENTMCNC: 34 GM/DL (ref 32.2–35.5)
MCV RBC AUTO: 92.2 FL (ref 81–99)
MONOCYTES ABSOLUTE: 0.5 THOU/MM3 (ref 0.4–1.3)
MONOCYTES NFR BLD AUTO: 9.4 %
NEUTROPHILS ABSOLUTE: 2.9 THOU/MM3 (ref 1.8–7.7)
NEUTROPHILS NFR BLD AUTO: 58.6 %
NRBC BLD AUTO-RTO: 0 /100 WBC
PLATELET # BLD AUTO: 292 THOU/MM3 (ref 130–400)
PMV BLD AUTO: 9.6 FL (ref 9.4–12.4)
RBC # BLD AUTO: 3.96 MILL/MM3 (ref 4.2–5.4)
RHEUMATOID FACT SERPL-ACNC: < 10 IU/ML (ref 0–13)
WBC # BLD AUTO: 4.9 THOU/MM3 (ref 4.8–10.8)

## 2025-04-08 PROCEDURE — 99214 OFFICE O/P EST MOD 30 MIN: CPT | Performed by: PSYCHIATRY & NEUROLOGY

## 2025-04-08 PROCEDURE — 90833 PSYTX W PT W E/M 30 MIN: CPT | Performed by: PSYCHIATRY & NEUROLOGY

## 2025-04-08 PROCEDURE — 99215 OFFICE O/P EST HI 40 MIN: CPT | Performed by: NURSE PRACTITIONER

## 2025-04-08 RX ORDER — GABAPENTIN 600 MG/1
600 TABLET ORAL 3 TIMES DAILY
Qty: 90 TABLET | Refills: 2 | Status: SHIPPED | OUTPATIENT
Start: 2025-04-08 | End: 2025-07-07

## 2025-04-08 RX ORDER — RIZATRIPTAN BENZOATE 10 MG/1
10 TABLET ORAL
Qty: 9 TABLET | Refills: 3 | Status: SHIPPED | OUTPATIENT
Start: 2025-04-08

## 2025-04-08 RX ORDER — PROPRANOLOL HYDROCHLORIDE 80 MG/1
80 CAPSULE, EXTENDED RELEASE ORAL DAILY
Qty: 30 CAPSULE | Refills: 1 | Status: SHIPPED | OUTPATIENT
Start: 2025-04-08

## 2025-04-08 ASSESSMENT — ENCOUNTER SYMPTOMS
SHORTNESS OF BREATH: 1
VOMITING: 0
DIARRHEA: 0
BLOOD IN STOOL: 0
BACK PAIN: 1
NAUSEA: 0
CONSTIPATION: 0

## 2025-04-08 NOTE — PROGRESS NOTES
Chief Complaint   Patient presents with    Follow-up     4 Week follow up. Patient states the migraines are still severe. C/O of numbness and pain throughout body, worse from low back down through legs along with migraines. Vertigo is worse than previously.          SUBJECTIVE     Sabina Bocanegra is a 34 y.o.female      History of Present Illness  The patient is a 34-year-old female who presents for a follow-up visit.    She reports an escalation in the severity of her migraines, which she attributes to increased physical activity. Ocular migraines are partially alleviated by Maxalt, which helps her fall asleep and wake up without pain. She has a scheduled appointment with her neurologist on 05/01/2025 or 05/30/2025. Topamax has been prescribed as a preventive measure but has not provided noticeable improvement. Nortriptyline has been prescribed for sleep and pain management.    Episodes of blackouts during showers, difficulty in performing personal hygiene tasks such as shaving, and breathlessness during simple activities like dressing or walking short distances are reported. Nausea and dizziness occur when outdoors, limiting her ability to engage in physical activities such as running or jumping. A weight gain of 30 pounds is noted, along with constant pain in her thighs, neck, and armpits. Gabapentin 1200 mg is currently taken for anxiety but is ineffective for pain management. Lyrica was previously tried without success.    Numbness and tingling in her feet, weakness in her knees and legs, and persistent low back pain are experienced. Degeneration at L5-S1 has been diagnosed, and an MRI has been performed.    Meclizine is taken for vertigo, which provides some relief. Moderate to severe hearing loss has been diagnosed, and a hearing aid fitting is scheduled for 05/01/2025. Tinnitus occurs upon waking and subsides after approximately 20 minutes but recurs throughout the day.        Review of Systems

## 2025-04-08 NOTE — PROGRESS NOTES
Functionality Assessment/Goals Worksheet     On a scale of 0 (Does not Interfere) to 10 (Completely Interferes)     1.  Which number describes how during the past week pain has interfered with           the following:  A.  General Activity:  10  B.  Mood: 10  C.  Walking Ability:  10  D.  Normal Work (Includes both work outside the home and housework):  10  E.  Relations with Other People:   8  F.  Sleep:   10  G.  Enjoyment of Life:   10    2.  Patient Prefers to Take their Pain Medications:     []  On a regular basis   [x]  Only when necessary    []  Does not take pain medications    3.  What are the Patient's Goals/Expectations for Visiting Pain Management?     []  Learn about my pain    []  Receive Medication   [x]  Physical Therapy     []  Treat Depression   [x]  Receive Injections    []  Treat Sleep   []  Deal with Anxiety and Stress   []  Treat Opoid Dependence/Addiction   []  Other:

## 2025-04-08 NOTE — TELEPHONE ENCOUNTER
Spoke with Dr Cespedes. He would recommend against the amitriptyline due to increased risk for interactions of other medications. Will send in propranolol instead. Please have her stop Topamax. Thanks, TS

## 2025-04-08 NOTE — TELEPHONE ENCOUNTER
I called patient to ask which diagnosis the referral is for and she stated everything she has been dealing with since September. Migraines, possible POTS, psychogenic seizures.

## 2025-04-08 NOTE — TELEPHONE ENCOUNTER
Patient notified to stop Topamax and informed Propanolol will be sent to pharmacy.     Patient also informed me that Dr. Cespedes recommended that she talks to you about possible getting a referral to [facility]. Please advise.

## 2025-04-08 NOTE — PATIENT INSTRUCTIONS
Plan:  Sabina Bocanegra, it was nice seeing you today  Stop Doxepin  Continue Prozac, gabapentin and hydroxyzine  Follow up in 3 months      -Please take medications as prescribed  -Refrain from alcohol or drug use  -Seek emergency help via the emergency and/or calling 911 should symptoms become severe, worsen, or with other concerning symptoms.Go immediately to the emergency room and/or call 911 with any suicidal or homicidal ideations or if audio/visual hallucinations develop.   -Contact office with any questions or concerns.      Crisis phone numbers:  988-national suicide hotline, call or text  [facility] [phone].  [facility] [phone]  [facility] [phone].  [facility] [phone].  [facility] [phone].  [facility] [phone].
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
## 2025-04-08 NOTE — PROGRESS NOTES
nightly as needed for sleep  Risks, benefits and alternatives discussed.   Consider in the future: We will consider other medication changes once syncopal episodes are worked up  Collateral: obtain collateral as needed  Referral: None  Labs/Tests/Imaging: None  Records Reviewed: [facility]  Patient advised to call if patient has any difficulties with treatment. Patient informed to call [phone] or go to ER if experiencing crisis, SI, HI, or psychosis.  Return in about 3 months (around 7/8/2025).      Sabina Bocanegra, was evaluated through a synchronous (real-time) audio-video encounter. The patient (or guardian if applicable) is aware that this is a billable service, which includes applicable co-pays. This Virtual Visit was conducted with patient's (and/or legal guardian's) consent. Patient identification was verified, and a caregiver was present when appropriate.   The patient was located at Home: [address]  [place] [address]  Provider was located at Home (Appt Dept State): [place]  Confirm you are appropriately licensed, registered, or certified to deliver care in the state where the patient is located as indicated above. If you are not or unsure, please re-schedule the visit: Yes, I confirm.        Total time spent for this encounter: Not billed by time    --Miguel Cespedes MD on 4/8/2025 at 1:03 PM    An electronic signature was used to authenticate this note.

## 2025-04-08 NOTE — TELEPHONE ENCOUNTER
I am okay with that. Can we see which diagnosis the referral is for? Migraines, possible POTS, psychogenic seizures, etc? TS

## 2025-04-09 NOTE — TELEPHONE ENCOUNTER
Referrals placed to  [facility]  [address]  [place], [place] [address] [phone] FAX  [phone] for cardiology

## 2025-04-09 NOTE — TELEPHONE ENCOUNTER
Hu for referrals to [facility] for Neurology (psychogenic seizures, migraines) and  Cardiology (POTS). TS

## 2025-04-10 ENCOUNTER — PATIENT MESSAGE (OUTPATIENT)
Dept: PHYSICAL MEDICINE AND REHAB | Age: 35
End: 2025-04-10

## 2025-04-10 DIAGNOSIS — G89.29 CHRONIC RIGHT SHOULDER PAIN: ICD-10-CM

## 2025-04-10 DIAGNOSIS — M79.671 RIGHT FOOT PAIN: ICD-10-CM

## 2025-04-10 DIAGNOSIS — M25.511 CHRONIC RIGHT SHOULDER PAIN: ICD-10-CM

## 2025-04-10 DIAGNOSIS — M54.2 NECK PAIN: ICD-10-CM

## 2025-04-10 DIAGNOSIS — G89.29 CHRONIC BILATERAL LOW BACK PAIN, UNSPECIFIED WHETHER SCIATICA PRESENT: Primary | ICD-10-CM

## 2025-04-10 DIAGNOSIS — M54.50 CHRONIC BILATERAL LOW BACK PAIN, UNSPECIFIED WHETHER SCIATICA PRESENT: Primary | ICD-10-CM

## 2025-04-10 DIAGNOSIS — M54.6 CHRONIC BILATERAL THORACIC BACK PAIN: ICD-10-CM

## 2025-04-10 DIAGNOSIS — G89.29 CHRONIC BILATERAL THORACIC BACK PAIN: ICD-10-CM

## 2025-04-10 LAB — NUCLEAR IGG SER QL IA: NORMAL

## 2025-04-11 ENCOUNTER — PATIENT MESSAGE (OUTPATIENT)
Dept: FAMILY MEDICINE CLINIC | Age: 35
End: 2025-04-11

## 2025-04-12 DIAGNOSIS — G43.E09 CHRONIC MIGRAINE WITH AURA WITHOUT STATUS MIGRAINOSUS, NOT INTRACTABLE: ICD-10-CM

## 2025-04-14 RX ORDER — PROPRANOLOL HYDROCHLORIDE 80 MG/1
CAPSULE, EXTENDED RELEASE ORAL DAILY
Qty: 90 CAPSULE | Refills: 0 | OUTPATIENT
Start: 2025-04-14

## 2025-04-14 NOTE — TELEPHONE ENCOUNTER
This medication refill is regarding a electronic request. Refill requested by  [facility] .    Requested Prescriptions     Pending Prescriptions Disp Refills    propranolol (INDERAL LA) 80 MG extended release capsule [Pharmacy Med Name: PROPRANOLOL ER 80 MG CAPSULE] 90 capsule 0     Sig: TAKE 1 CAPSULE BY MOUTH EVERY DAY     Date of last visit: 4/8/2025   Date of next visit: None  Date of last refill: 4/8/25 #30/1    Rx verified, ordered and set to EP.      Prescription change requested by pharmacy for a 90 day supply rather than a 30 day supply. Prescription updated for #90/0.

## 2025-04-14 NOTE — TELEPHONE ENCOUNTER
You  Sabina Engel now (8:50 AM)       That sounds great. Thanks for the update. Jennyfer    This [employer] message has not been read.     Sabina Faganx [facility] Clinical Staff (supporting You)3 days ago       4/25/25 8am: ekg and cardio provider appt  5/13/25 10am: neuro provider appt   1pm admittance for epilepsy testing 3:5 day stay  5/19/25 9am: neuromuscular provider appt

## 2025-04-21 ENCOUNTER — PATIENT MESSAGE (OUTPATIENT)
Dept: FAMILY MEDICINE CLINIC | Age: 35
End: 2025-04-21

## 2025-04-25 DIAGNOSIS — F60.3 BORDERLINE PERSONALITY DISORDER (HCC): ICD-10-CM

## 2025-04-25 DIAGNOSIS — F41.1 GAD (GENERALIZED ANXIETY DISORDER): ICD-10-CM

## 2025-04-25 DIAGNOSIS — F43.12 CHRONIC POST-TRAUMATIC STRESS DISORDER (PTSD): ICD-10-CM

## 2025-04-28 DIAGNOSIS — F43.10 PTSD (POST-TRAUMATIC STRESS DISORDER): ICD-10-CM

## 2025-04-28 DIAGNOSIS — F60.3 BORDERLINE PERSONALITY DISORDER (HCC): ICD-10-CM

## 2025-04-28 DIAGNOSIS — F39 UNSPECIFIED MOOD (AFFECTIVE) DISORDER: ICD-10-CM

## 2025-04-28 DIAGNOSIS — F43.12 CHRONIC POST-TRAUMATIC STRESS DISORDER (PTSD): ICD-10-CM

## 2025-04-28 DIAGNOSIS — F41.1 GAD (GENERALIZED ANXIETY DISORDER): ICD-10-CM

## 2025-04-28 RX ORDER — HYDROXYZINE HYDROCHLORIDE 50 MG/1
50-100 TABLET, FILM COATED ORAL NIGHTLY PRN
Qty: 60 TABLET | Refills: 5 | Status: SHIPPED | OUTPATIENT
Start: 2025-04-28

## 2025-04-28 RX ORDER — HYDROXYZINE HYDROCHLORIDE 50 MG/1
50-100 TABLET, FILM COATED ORAL NIGHTLY PRN
Qty: 60 TABLET | Refills: 4 | OUTPATIENT
Start: 2025-04-28

## 2025-04-28 RX ORDER — GABAPENTIN 300 MG/1
CAPSULE ORAL
OUTPATIENT
Start: 2025-04-28

## 2025-04-28 RX ORDER — FLUOXETINE HYDROCHLORIDE 40 MG/1
CAPSULE ORAL DAILY
Qty: 30 CAPSULE | Refills: 5 | Status: SHIPPED | OUTPATIENT
Start: 2025-04-28

## 2025-04-28 NOTE — TELEPHONE ENCOUNTER
Patient called the office to request Rx's for the 20 mg Fluoxetine along with the 50 mg hydroxyzine. Rx's previously sent to [facility] on [address], however, patient is no longer using this pharmacy and has relocated to [place]. She asks for new prescriptions to be sent. Patient also mentioned requesting her gabapentin 300 mg to have on hand just in case. Patient mentions this provider, pain management and PCP have all been working towards resolution for pain/anxiety. Per chart review, it appears pain management d/c prescription previously sent by this provider and increased dose to 600 mg TID. Staff encouraged patient to contact pain management regarding Rx for 300 mg. Rx is still pending. Please advise.

## 2025-04-28 NOTE — TELEPHONE ENCOUNTER
[facility] is requesting a refill on the following medications:  Requested Prescriptions     Pending Prescriptions Disp Refills    FLUoxetine (PROZAC) 40 MG capsule [Pharmacy Med Name: FLUOXETINE HCL 40 MG CAPSULE] 30 capsule 1     Sig: TAKE 1 CAPSULE BY MOUTH EVERY DAY     Refused Prescriptions Disp Refills    hydrOXYzine HCl (ATARAX) 50 MG tablet [Pharmacy Med Name: HYDROXYZINE HCL 50 MG TABLET] 60 tablet 4     Sig: Take 1-2 tablets by mouth nightly as needed for Anxiety (and insomnia)     *Hydroxyzine last sent 01/13/2025 with 5 additional refills.   Date of last visit: 4/8/2025  Date of next visit (if applicable):6/10/2025  Date Rx last sent by provider: 02/10/2025  Pharmacy Name: [facility]

## 2025-04-29 DIAGNOSIS — F41.1 GAD (GENERALIZED ANXIETY DISORDER): ICD-10-CM

## 2025-04-29 DIAGNOSIS — F43.12 CHRONIC POST-TRAUMATIC STRESS DISORDER (PTSD): ICD-10-CM

## 2025-04-29 DIAGNOSIS — F60.3 BORDERLINE PERSONALITY DISORDER (HCC): ICD-10-CM

## 2025-04-29 RX ORDER — HYDROXYZINE HYDROCHLORIDE 50 MG/1
50-100 TABLET, FILM COATED ORAL NIGHTLY PRN
Qty: 60 TABLET | Refills: 5 | OUTPATIENT
Start: 2025-04-29

## 2025-04-29 NOTE — TELEPHONE ENCOUNTER
I will defer Gabapentin management to pain team as they have been managing. Other prescriptions have been sent.    Dr. Cespedes

## 2025-05-01 ENCOUNTER — HOSPITAL ENCOUNTER (OUTPATIENT)
Dept: AUDIOLOGY | Age: 35
Discharge: HOME | End: 2025-05-01
Payer: MEDICAID

## 2025-05-01 PROCEDURE — 92537 CALORIC VSTBLR TEST W/REC: CPT | Performed by: AUDIOLOGIST

## 2025-05-01 PROCEDURE — 92540 BASIC VESTIBULAR EVALUATION: CPT | Performed by: AUDIOLOGIST

## 2025-05-01 NOTE — PROGRESS NOTES
ACCOUNT #: [id number]      VNG REPORT      CHIEF COMPLAINT: VNG testing per the request of AYANA Garcia, due to the diagnosis of vertigo and dizziness. She experiences several different types of episodes. During some episodes the word around her begins to spin, during others she feels faint/ she sees stars, other times her body feels heavy like she is sleepy and she needs to sit down if she is standing. Her episodes are more severe the longer she stands. She also becomes dizzy when riding in the car- she is unable to ride in the back seat due to severe car sickness. She also experienced a stroke like seizure at work and has been unable to work. She feels off balance or dizzy most of the time. She reports constant tinnitus that varies in intensity and fluctuating hearing levels. She reports a history of noise exposure and a family history of hearing loss including several members that developed hearing loss at a young age. Her health history is significant for seizures, syncope, autism, possible neurological disorder, and migraines. She is in the process of obtaining bilateral hearing aids at a provider in [place], [place]. She is wearing a Holter monitor today per her report.    Previous audiometry completed 03/05/2025 at [facility] indicated a moderate rising to mild sensorineural hearing lost at 250-2000 Hz sloping to a predominately moderate sensorineural hearing loss there after in the right ear and a severe sensorineural hearing loss with a slight conductive component at 500 Hz rising to a mild sensorineural hearing loss through 3000 Hz sloping to a moderate sensorineural hearing loss there after in the left ear. Speech discrimination scores were poor at 64% in the right ear and fair at 72% in the left ear. Speech reception thresholds agreed with pure tone results. Tympanometry not completed due to discomfort reported with testing.    MEDICATIONS REVIEWED: Patient took her medications this morning as

## 2025-05-06 ENCOUNTER — PATIENT MESSAGE (OUTPATIENT)
Dept: FAMILY MEDICINE CLINIC | Age: 35
End: 2025-05-06

## 2025-05-14 ENCOUNTER — PATIENT MESSAGE (OUTPATIENT)
Dept: FAMILY MEDICINE CLINIC | Age: 35
End: 2025-05-14

## 2025-05-28 ENCOUNTER — TELEPHONE (OUTPATIENT)
Dept: PSYCHIATRY | Age: 35
End: 2025-05-28

## 2025-05-28 DIAGNOSIS — F41.1 GAD (GENERALIZED ANXIETY DISORDER): ICD-10-CM

## 2025-05-28 DIAGNOSIS — F39 UNSPECIFIED MOOD (AFFECTIVE) DISORDER: ICD-10-CM

## 2025-05-28 DIAGNOSIS — F43.10 PTSD (POST-TRAUMATIC STRESS DISORDER): Primary | ICD-10-CM

## 2025-05-28 DIAGNOSIS — F60.3 BORDERLINE PERSONALITY DISORDER (HCC): ICD-10-CM

## 2025-05-28 NOTE — TELEPHONE ENCOUNTER
We can write a referral if needed we would just need fax to send referral. How much more Hydroxyzine have you been taking. I am okay with up to 100mg nightly. Just monitor closely for side effects.

## 2025-05-29 NOTE — TELEPHONE ENCOUNTER
Sabina wrote back into the office via [employer];    200mg I got to bed at 9pm and I wake up at 6am. No middle of the night wake ups.   Zeke Paz  Phone:    [phone]  Fax:  [phone]  Email:   [email or web address]  Address:  [address] [place] [address]    Please advise.

## 2025-05-30 NOTE — TELEPHONE ENCOUNTER
Referral placed. I would advise against doses above 100mg nightly for hydroxyzine due to potential side effects. If sleep remains and issue we can discuss further options at next visit.    Dr. Cespedes

## 2025-06-09 DIAGNOSIS — F43.12 CHRONIC POST-TRAUMATIC STRESS DISORDER (PTSD): ICD-10-CM

## 2025-06-09 DIAGNOSIS — F43.10 PTSD (POST-TRAUMATIC STRESS DISORDER): ICD-10-CM

## 2025-06-09 DIAGNOSIS — F60.3 BORDERLINE PERSONALITY DISORDER (HCC): ICD-10-CM

## 2025-06-09 DIAGNOSIS — F39 UNSPECIFIED MOOD (AFFECTIVE) DISORDER: ICD-10-CM

## 2025-06-09 DIAGNOSIS — F41.1 GAD (GENERALIZED ANXIETY DISORDER): ICD-10-CM

## 2025-06-10 RX ORDER — ONDANSETRON 4 MG/1
4 TABLET, ORALLY DISINTEGRATING ORAL 3 TIMES DAILY PRN
Qty: 21 TABLET | Refills: 1 | Status: SHIPPED | OUTPATIENT
Start: 2025-06-10

## 2025-06-10 RX ORDER — HYDROXYZINE HYDROCHLORIDE 50 MG/1
50-100 TABLET, FILM COATED ORAL NIGHTLY PRN
Qty: 60 TABLET | Refills: 5 | OUTPATIENT
Start: 2025-06-10

## 2025-06-10 RX ORDER — FLUOXETINE HYDROCHLORIDE 40 MG/1
CAPSULE ORAL DAILY
Qty: 30 CAPSULE | Refills: 5 | OUTPATIENT
Start: 2025-06-10

## 2025-06-10 RX ORDER — GABAPENTIN 600 MG/1
600 TABLET ORAL 3 TIMES DAILY
Qty: 90 TABLET | Refills: 2 | OUTPATIENT
Start: 2025-06-10 | End: 2025-09-08

## 2025-06-10 NOTE — TELEPHONE ENCOUNTER
This medication refill is regarding a electronic request. Refill requested by patient.    Requested Prescriptions     Pending Prescriptions Disp Refills    ondansetron (ZOFRAN-ODT) 4 MG disintegrating tablet 21 tablet 1     Sig: Take 1 tablet by mouth 3 times daily as needed for Nausea or Vomiting       Date of last visit: 4/8/2025   Date of next visit: 6/27/2025

## 2025-06-16 ASSESSMENT — ANXIETY QUESTIONNAIRES
5. BEING SO RESTLESS THAT IT IS HARD TO SIT STILL: SEVERAL DAYS
7. FEELING AFRAID AS IF SOMETHING AWFUL MIGHT HAPPEN: NOT AT ALL
7. FEELING AFRAID AS IF SOMETHING AWFUL MIGHT HAPPEN: NOT AT ALL
IF YOU CHECKED OFF ANY PROBLEMS ON THIS QUESTIONNAIRE, HOW DIFFICULT HAVE THESE PROBLEMS MADE IT FOR YOU TO DO YOUR WORK, TAKE CARE OF THINGS AT HOME, OR GET ALONG WITH OTHER PEOPLE: NOT DIFFICULT AT ALL
1. FEELING NERVOUS, ANXIOUS, OR ON EDGE: SEVERAL DAYS
IF YOU CHECKED OFF ANY PROBLEMS ON THIS QUESTIONNAIRE, HOW DIFFICULT HAVE THESE PROBLEMS MADE IT FOR YOU TO DO YOUR WORK, TAKE CARE OF THINGS AT HOME, OR GET ALONG WITH OTHER PEOPLE: NOT DIFFICULT AT ALL
2. NOT BEING ABLE TO STOP OR CONTROL WORRYING: NOT AT ALL
5. BEING SO RESTLESS THAT IT IS HARD TO SIT STILL: SEVERAL DAYS
3. WORRYING TOO MUCH ABOUT DIFFERENT THINGS: NOT AT ALL
6. BECOMING EASILY ANNOYED OR IRRITABLE: MORE THAN HALF THE DAYS
1. FEELING NERVOUS, ANXIOUS, OR ON EDGE: SEVERAL DAYS
4. TROUBLE RELAXING: MORE THAN HALF THE DAYS
6. BECOMING EASILY ANNOYED OR IRRITABLE: MORE THAN HALF THE DAYS
3. WORRYING TOO MUCH ABOUT DIFFERENT THINGS: NOT AT ALL
GAD7 TOTAL SCORE: 6
4. TROUBLE RELAXING: MORE THAN HALF THE DAYS
2. NOT BEING ABLE TO STOP OR CONTROL WORRYING: NOT AT ALL

## 2025-06-16 ASSESSMENT — PATIENT HEALTH QUESTIONNAIRE - PHQ9
1. LITTLE INTEREST OR PLEASURE IN DOING THINGS: MORE THAN HALF THE DAYS
SUM OF ALL RESPONSES TO PHQ QUESTIONS 1-9: 11
7. TROUBLE CONCENTRATING ON THINGS, SUCH AS READING THE NEWSPAPER OR WATCHING TELEVISION: NOT AT ALL
3. TROUBLE FALLING OR STAYING ASLEEP: NEARLY EVERY DAY
10. IF YOU CHECKED OFF ANY PROBLEMS, HOW DIFFICULT HAVE THESE PROBLEMS MADE IT FOR YOU TO DO YOUR WORK, TAKE CARE OF THINGS AT HOME, OR GET ALONG WITH OTHER PEOPLE: VERY DIFFICULT
2. FEELING DOWN, DEPRESSED OR HOPELESS: NOT AT ALL
6. FEELING BAD ABOUT YOURSELF - OR THAT YOU ARE A FAILURE OR HAVE LET YOURSELF OR YOUR FAMILY DOWN: NOT AT ALL
8. MOVING OR SPEAKING SO SLOWLY THAT OTHER PEOPLE COULD HAVE NOTICED. OR THE OPPOSITE, BEING SO FIGETY OR RESTLESS THAT YOU HAVE BEEN MOVING AROUND A LOT MORE THAN USUAL: NOT AT ALL
SUM OF ALL RESPONSES TO PHQ QUESTIONS 1-9: 11
4. FEELING TIRED OR HAVING LITTLE ENERGY: NEARLY EVERY DAY
1. LITTLE INTEREST OR PLEASURE IN DOING THINGS: MORE THAN HALF THE DAYS
4. FEELING TIRED OR HAVING LITTLE ENERGY: NEARLY EVERY DAY
2. FEELING DOWN, DEPRESSED OR HOPELESS: NOT AT ALL
5. POOR APPETITE OR OVEREATING: NEARLY EVERY DAY
5. POOR APPETITE OR OVEREATING: NEARLY EVERY DAY
9. THOUGHTS THAT YOU WOULD BE BETTER OFF DEAD, OR OF HURTING YOURSELF: NOT AT ALL
10. IF YOU CHECKED OFF ANY PROBLEMS, HOW DIFFICULT HAVE THESE PROBLEMS MADE IT FOR YOU TO DO YOUR WORK, TAKE CARE OF THINGS AT HOME, OR GET ALONG WITH OTHER PEOPLE: VERY DIFFICULT
3. TROUBLE FALLING OR STAYING ASLEEP: NEARLY EVERY DAY
8. MOVING OR SPEAKING SO SLOWLY THAT OTHER PEOPLE COULD HAVE NOTICED. OR THE OPPOSITE - BEING SO FIDGETY OR RESTLESS THAT YOU HAVE BEEN MOVING AROUND A LOT MORE THAN USUAL: NOT AT ALL
SUM OF ALL RESPONSES TO PHQ QUESTIONS 1-9: 11
SUM OF ALL RESPONSES TO PHQ QUESTIONS 1-9: 11
9. THOUGHTS THAT YOU WOULD BE BETTER OFF DEAD, OR OF HURTING YOURSELF: NOT AT ALL
SUM OF ALL RESPONSES TO PHQ QUESTIONS 1-9: 11
6. FEELING BAD ABOUT YOURSELF - OR THAT YOU ARE A FAILURE OR HAVE LET YOURSELF OR YOUR FAMILY DOWN: NOT AT ALL
7. TROUBLE CONCENTRATING ON THINGS, SUCH AS READING THE NEWSPAPER OR WATCHING TELEVISION: NOT AT ALL

## 2025-06-19 ENCOUNTER — TELEPHONE (OUTPATIENT)
Dept: PHYSICAL MEDICINE AND REHAB | Age: 35
End: 2025-06-19

## 2025-06-19 ENCOUNTER — OFFICE VISIT (OUTPATIENT)
Dept: PSYCHIATRY | Age: 35
End: 2025-06-19

## 2025-06-19 VITALS — DIASTOLIC BLOOD PRESSURE: 93 MMHG | HEART RATE: 67 BPM | SYSTOLIC BLOOD PRESSURE: 164 MMHG

## 2025-06-19 DIAGNOSIS — F60.3 BORDERLINE PERSONALITY DISORDER (HCC): ICD-10-CM

## 2025-06-19 DIAGNOSIS — F41.1 GAD (GENERALIZED ANXIETY DISORDER): ICD-10-CM

## 2025-06-19 DIAGNOSIS — F43.10 PTSD (POST-TRAUMATIC STRESS DISORDER): Primary | ICD-10-CM

## 2025-06-19 DIAGNOSIS — G47.00 INSOMNIA, PERSISTENT: ICD-10-CM

## 2025-06-19 RX ORDER — GABAPENTIN 600 MG/1
600 TABLET ORAL 3 TIMES DAILY
Qty: 90 TABLET | Refills: 2 | Status: SHIPPED | OUTPATIENT
Start: 2025-06-19 | End: 2025-06-22 | Stop reason: SDUPTHER

## 2025-06-19 RX ORDER — DOXEPIN HYDROCHLORIDE 10 MG/1
10 CAPSULE ORAL NIGHTLY
Qty: 30 CAPSULE | Refills: 2 | Status: SHIPPED | OUTPATIENT
Start: 2025-06-19

## 2025-06-19 ASSESSMENT — PATIENT HEALTH QUESTIONNAIRE - PHQ9
SUM OF ALL RESPONSES TO PHQ QUESTIONS 1-9: 9
10. IF YOU CHECKED OFF ANY PROBLEMS, HOW DIFFICULT HAVE THESE PROBLEMS MADE IT FOR YOU TO DO YOUR WORK, TAKE CARE OF THINGS AT HOME, OR GET ALONG WITH OTHER PEOPLE: VERY DIFFICULT
SUM OF ALL RESPONSES TO PHQ QUESTIONS 1-9: 9
1. LITTLE INTEREST OR PLEASURE IN DOING THINGS: NOT AT ALL
6. FEELING BAD ABOUT YOURSELF - OR THAT YOU ARE A FAILURE OR HAVE LET YOURSELF OR YOUR FAMILY DOWN: NOT AT ALL
8. MOVING OR SPEAKING SO SLOWLY THAT OTHER PEOPLE COULD HAVE NOTICED. OR THE OPPOSITE, BEING SO FIGETY OR RESTLESS THAT YOU HAVE BEEN MOVING AROUND A LOT MORE THAN USUAL: NOT AT ALL
SUM OF ALL RESPONSES TO PHQ QUESTIONS 1-9: 9
7. TROUBLE CONCENTRATING ON THINGS, SUCH AS READING THE NEWSPAPER OR WATCHING TELEVISION: NOT AT ALL
4. FEELING TIRED OR HAVING LITTLE ENERGY: NEARLY EVERY DAY
2. FEELING DOWN, DEPRESSED OR HOPELESS: NOT AT ALL
SUM OF ALL RESPONSES TO PHQ QUESTIONS 1-9: 9
9. THOUGHTS THAT YOU WOULD BE BETTER OFF DEAD, OR OF HURTING YOURSELF: NOT AT ALL
5. POOR APPETITE OR OVEREATING: NEARLY EVERY DAY
3. TROUBLE FALLING OR STAYING ASLEEP: NEARLY EVERY DAY

## 2025-06-19 NOTE — PROGRESS NOTES
[facility] [place] [facility]  [facility]  [address] [address] [phone]    Progress Note    Patient:  Sabina Bocanegra  YOB: 1990  PCP:  Jennyfer Butcher APRN - CNP  Visit Date:  6/19/2025      Reason for Visit/CC:  Follow up for medication management for    Diagnosis Orders   1. PTSD (post-traumatic stress disorder)        2. MARGO (generalized anxiety disorder)        3. Borderline personality disorder (HCC)        4. Insomnia, persistent  doxepin (SINEQUAN) 10 MG capsule            Assessment & Plan      Clinical Impression:  Patient exhibiting some emotional blunting likely related to Prozac. Her anxiety is improving on current regimen as well as engagement in psychotherapy. Still a cluster B component but has shown progress in regards to coping and distress tolerance. Trauma based therapy is likely to be helpful for FND related seizures.     Therapeutic Intervention:  16 minutes spent in psychotherapy. CBT and supportive utilized. Focus was on stressors and coping.    Plan:  - Reduce Prozac dosage to 40 mg due to concern for emotional blunting.  - Start doxepin 10 mg at night for middle insomnia  - Continue hydroxyzine  mg with doxepin, but try doxepin alone for a few nights.  - Report any side effects promptly.    Follow-up:  - Follow-up appointment in 2 months.    Notes & Risk Factors:  - Potential risk of reactive hypoglycemia.  - Protective factors include active engagement in trauma therapy and improved communication with family members.    There was no indication of imminent danger to self or others based on today's exam; outpatient care is appropriate. Modifiable risk factors are being addressed. Safety plan is in place.  Patient/Family aware of how to reach me/covering clinician and members of treatment team in case of emergency and that mychart message is for non-urgent logistical matters only such as scheduling or

## 2025-06-19 NOTE — PATIENT INSTRUCTIONS
Plan:  Sabina Bocanegra, it was nice seeing you today  Decrease Prozac to 40mg daily  Start Doxepin 10mg nightly  Continue all other medications  Follow up in 2 months      -Please take medications as prescribed  -Refrain from alcohol or drug use  -Seek emergency help via the emergency and/or calling 911 should symptoms become severe, worsen, or with other concerning symptoms.Go immediately to the emergency room and/or call 911 with any suicidal or homicidal ideations or if audio/visual hallucinations develop.   -Contact office with any questions or concerns.      Crisis phone numbers:  988-national suicide hotline, call or text  [facility] [phone].  [facility] [phone]  [facility] [phone].  [facility] [phone].  [facility] [phone].  [facility] [phone].

## 2025-06-23 RX ORDER — GABAPENTIN 600 MG/1
600 TABLET ORAL 3 TIMES DAILY
Qty: 90 TABLET | Refills: 2 | Status: SHIPPED | OUTPATIENT
Start: 2025-07-12 | End: 2025-10-10

## 2025-06-27 ENCOUNTER — OFFICE VISIT (OUTPATIENT)
Dept: FAMILY MEDICINE CLINIC | Age: 35
End: 2025-06-27
Payer: MEDICAID

## 2025-06-27 VITALS
HEART RATE: 84 BPM | WEIGHT: 183 LBS | RESPIRATION RATE: 16 BRPM | HEIGHT: 66 IN | SYSTOLIC BLOOD PRESSURE: 128 MMHG | BODY MASS INDEX: 29.41 KG/M2 | DIASTOLIC BLOOD PRESSURE: 78 MMHG | TEMPERATURE: 98.1 F

## 2025-06-27 DIAGNOSIS — E16.2 HYPOGLYCEMIA: ICD-10-CM

## 2025-06-27 DIAGNOSIS — Z00.00 WELL ADULT EXAM: Primary | ICD-10-CM

## 2025-06-27 DIAGNOSIS — R53.82 CHRONIC FATIGUE: ICD-10-CM

## 2025-06-27 DIAGNOSIS — F41.1 GAD (GENERALIZED ANXIETY DISORDER): ICD-10-CM

## 2025-06-27 DIAGNOSIS — G47.00 INSOMNIA, PERSISTENT: ICD-10-CM

## 2025-06-27 DIAGNOSIS — F48.8 PSYCHOGENIC SYNCOPE: ICD-10-CM

## 2025-06-27 PROCEDURE — 99395 PREV VISIT EST AGE 18-39: CPT | Performed by: NURSE PRACTITIONER

## 2025-06-27 NOTE — PROGRESS NOTES
Health Maintenance Due   Topic Date Due    Varicella vaccine (1 of 2 - 13+ 2-dose series) Never done    Hepatitis B vaccine (1 of 3 - 19+ 3-dose series) Never done    DTaP/Tdap/Td vaccine (1 - Tdap) Never done    COVID-19 Vaccine (1 - 2024-25 season) Never done      
when hungry. In spite of this, she has gained over 50 pounds in 9 months. She experienced significant weight gain and bloating after her second child due to female health issues. She underwent a hysterectomy in 03/2017, during which she lost 30 pounds. She had her left ovary removed in 10/2017, along with another 6 pounds of endometriosis.    She has a history of ulcerative colitis and was diagnosed with GERD and gastritis in her senior year of high school. She saw a GI doctor on Wednesday who plans to do a colonoscopy and start her on medication for constipation or stomach pain.    She wears hearing aids and had her ears checked on Wednesday.    Review of Systems   Constitutional:  Positive for fatigue. Negative for chills, diaphoresis and fever.   HENT:  Positive for hearing loss (now has hearing aids).    Respiratory:  Negative for shortness of breath.    Cardiovascular:  Negative for chest pain, palpitations and leg swelling.   Gastrointestinal:  Negative for blood in stool, constipation, diarrhea, nausea and vomiting.   Genitourinary:  Negative for dysuria and hematuria.   Musculoskeletal:  Negative for myalgias.   Neurological:  Positive for seizures and syncope. Negative for dizziness and headaches.   Psychiatric/Behavioral:  Positive for sleep disturbance. The patient is nervous/anxious.    All other systems reviewed and are negative.    OBJECTIVE     /78 (BP Site: Left Upper Arm, Patient Position: Sitting)   Pulse 84   Temp 98.1 °F (36.7 °C) (Oral)   Resp 16   Ht 1.676 m (5' 5.98\")   Wt 83 kg (183 lb)   BMI 29.55 kg/m²     Wt Readings from Last 3 Encounters:   06/27/25 83 kg (183 lb)   04/08/25 86.6 kg (191 lb)   04/08/25 86.9 kg (191 lb 9.6 oz)       Physical Exam  Vitals and nursing note reviewed.   Constitutional:       Appearance: She is well-developed.   HENT:      Head: Normocephalic and atraumatic.      Right Ear: External ear normal.      Left Ear: External ear normal.      Nose: Nose

## 2025-06-30 ASSESSMENT — ENCOUNTER SYMPTOMS
VOMITING: 0
NAUSEA: 0
CONSTIPATION: 0
BLOOD IN STOOL: 0
SHORTNESS OF BREATH: 0
DIARRHEA: 0

## 2025-07-01 ENCOUNTER — TELEPHONE (OUTPATIENT)
Dept: PSYCHIATRY | Age: 35
End: 2025-07-01

## 2025-07-01 NOTE — TELEPHONE ENCOUNTER
Called patient checking to see if patient got set up to see Zeke Paz. A referral was placed for her to be seen. Called [facility] off and they never returned staff's call.    Patient stated patient couldn't get set up with him. Patient stated she is seeing Soniya Gutierrez and she is in the same office.    Last visit 06/19/25  Next visit 08/19/25    Pending your advice.

## 2025-07-14 ENCOUNTER — PATIENT MESSAGE (OUTPATIENT)
Dept: FAMILY MEDICINE CLINIC | Age: 35
End: 2025-07-14

## 2025-07-15 ENCOUNTER — TELEPHONE (OUTPATIENT)
Dept: PSYCHIATRY | Age: 35
End: 2025-07-15

## 2025-07-15 NOTE — TELEPHONE ENCOUNTER
I have talked to my trauma therapist and I am not okay. Mentally at all. And I think I want to be checked into [facility] here in [place]. I can’t keep shadowing the fact that I am not good or happy. I might need a referral handy. I’m just not okay. I do not want to be here but I am not in danger to myself. I just am exhausted with all that is going on and I am not well. So I am letting you know. If you’d like to contact Soniya Gutierrez I gave you her card.     Scratch that. I am going to go into the ED at [facility] and seek help through there and will be admitted to [facility] through [facility].

## 2025-07-15 NOTE — TELEPHONE ENCOUNTER
Spoke with patient via phone. She is on her way to [facility] for ED evaluation. Reports increasing depression and feeling a \"flood of emotions\". Thinks part of it is driven by ongoing trauma therapy. She reports increasing passive SI but no intent or plan. Is with her mom who is assisting her to ED. Patient is going to keep me updated and if not admitted we will work on getting sooner appointment.    Dr. Cespedes

## 2025-07-21 ENCOUNTER — PATIENT MESSAGE (OUTPATIENT)
Dept: PHYSICAL MEDICINE AND REHAB | Age: 35
End: 2025-07-21

## 2025-07-21 ENCOUNTER — SOCIAL WORK (OUTPATIENT)
Age: 35
End: 2025-07-21

## 2025-07-21 NOTE — PROGRESS NOTES
The office received a fax, for this patient. She does not appear to need MAT services at this time.   Aydin forwarded fax to Dr. Riojas's office, with note that if she is in need to inform office and aydin will call and set patient up with a provider.

## 2025-07-29 ASSESSMENT — PATIENT HEALTH QUESTIONNAIRE - PHQ9
SUM OF ALL RESPONSES TO PHQ QUESTIONS 1-9: 8
5. POOR APPETITE OR OVEREATING: NEARLY EVERY DAY
5. POOR APPETITE OR OVEREATING: NEARLY EVERY DAY
SUM OF ALL RESPONSES TO PHQ QUESTIONS 1-9: 7
7. TROUBLE CONCENTRATING ON THINGS, SUCH AS READING THE NEWSPAPER OR WATCHING TELEVISION: NOT AT ALL
2. FEELING DOWN, DEPRESSED OR HOPELESS: SEVERAL DAYS
10. IF YOU CHECKED OFF ANY PROBLEMS, HOW DIFFICULT HAVE THESE PROBLEMS MADE IT FOR YOU TO DO YOUR WORK, TAKE CARE OF THINGS AT HOME, OR GET ALONG WITH OTHER PEOPLE: EXTREMELY DIFFICULT
4. FEELING TIRED OR HAVING LITTLE ENERGY: SEVERAL DAYS
9. THOUGHTS THAT YOU WOULD BE BETTER OFF DEAD, OR OF HURTING YOURSELF: SEVERAL DAYS
4. FEELING TIRED OR HAVING LITTLE ENERGY: SEVERAL DAYS
2. FEELING DOWN, DEPRESSED OR HOPELESS: SEVERAL DAYS
3. TROUBLE FALLING OR STAYING ASLEEP: NOT AT ALL
7. TROUBLE CONCENTRATING ON THINGS, SUCH AS READING THE NEWSPAPER OR WATCHING TELEVISION: NOT AT ALL
SUM OF ALL RESPONSES TO PHQ QUESTIONS 1-9: 8
10. IF YOU CHECKED OFF ANY PROBLEMS, HOW DIFFICULT HAVE THESE PROBLEMS MADE IT FOR YOU TO DO YOUR WORK, TAKE CARE OF THINGS AT HOME, OR GET ALONG WITH OTHER PEOPLE: EXTREMELY DIFFICULT
6. FEELING BAD ABOUT YOURSELF - OR THAT YOU ARE A FAILURE OR HAVE LET YOURSELF OR YOUR FAMILY DOWN: SEVERAL DAYS
9. THOUGHTS THAT YOU WOULD BE BETTER OFF DEAD, OR OF HURTING YOURSELF: SEVERAL DAYS
3. TROUBLE FALLING OR STAYING ASLEEP: NOT AT ALL
8. MOVING OR SPEAKING SO SLOWLY THAT OTHER PEOPLE COULD HAVE NOTICED. OR THE OPPOSITE, BEING SO FIGETY OR RESTLESS THAT YOU HAVE BEEN MOVING AROUND A LOT MORE THAN USUAL: NOT AT ALL
SUM OF ALL RESPONSES TO PHQ QUESTIONS 1-9: 8
SUM OF ALL RESPONSES TO PHQ QUESTIONS 1-9: 8
1. LITTLE INTEREST OR PLEASURE IN DOING THINGS: SEVERAL DAYS
6. FEELING BAD ABOUT YOURSELF - OR THAT YOU ARE A FAILURE OR HAVE LET YOURSELF OR YOUR FAMILY DOWN: SEVERAL DAYS
1. LITTLE INTEREST OR PLEASURE IN DOING THINGS: SEVERAL DAYS
8. MOVING OR SPEAKING SO SLOWLY THAT OTHER PEOPLE COULD HAVE NOTICED. OR THE OPPOSITE - BEING SO FIDGETY OR RESTLESS THAT YOU HAVE BEEN MOVING AROUND A LOT MORE THAN USUAL: NOT AT ALL

## 2025-07-29 ASSESSMENT — COLUMBIA-SUICIDE SEVERITY RATING SCALE - C-SSRS
3. IN THE PAST MONTH, HAVE YOU BEEN THINKING ABOUT HOW YOU MIGHT KILL YOURSELF?: YES
4. IN THE PAST MONTH, HAVE YOU HAD THESE THOUGHTS AND HAD SOME INTENTION OF ACTING ON THEM?: NO
7. DID THIS OCCUR IN THE LAST THREE MONTHS: NO
6. IN YOUR LIFETIME, HAVE YOU EVER DONE ANYTHING, STARTED TO DO ANYTHING, OR PREPARED TO DO ANYTHING TO END YOUR LIFE?: YES
1. IN THE PAST MONTH, HAVE YOU WISHED YOU WERE DEAD OR WISHED YOU COULD GO TO SLEEP AND NOT WAKE UP?: YES
2. IN THE PAST MONTH, HAVE YOU ACTUALLY HAD ANY THOUGHTS OF KILLING YOURSELF?: YES
5. IN THE PAST MONTH, HAVE YOU STARTED TO WORK OUT OR WORKED OUT THE DETAILS OF HOW TO KILL YOURSELF? DO YOU INTEND TO CARRY OUT THIS PLAN?: NO

## 2025-07-29 ASSESSMENT — ANXIETY QUESTIONNAIRES
6. BECOMING EASILY ANNOYED OR IRRITABLE: MORE THAN HALF THE DAYS
IF YOU CHECKED OFF ANY PROBLEMS ON THIS QUESTIONNAIRE, HOW DIFFICULT HAVE THESE PROBLEMS MADE IT FOR YOU TO DO YOUR WORK, TAKE CARE OF THINGS AT HOME, OR GET ALONG WITH OTHER PEOPLE: EXTREMELY DIFFICULT
1. FEELING NERVOUS, ANXIOUS, OR ON EDGE: MORE THAN HALF THE DAYS
5. BEING SO RESTLESS THAT IT IS HARD TO SIT STILL: SEVERAL DAYS
2. NOT BEING ABLE TO STOP OR CONTROL WORRYING: SEVERAL DAYS
6. BECOMING EASILY ANNOYED OR IRRITABLE: MORE THAN HALF THE DAYS
7. FEELING AFRAID AS IF SOMETHING AWFUL MIGHT HAPPEN: NOT AT ALL
IF YOU CHECKED OFF ANY PROBLEMS ON THIS QUESTIONNAIRE, HOW DIFFICULT HAVE THESE PROBLEMS MADE IT FOR YOU TO DO YOUR WORK, TAKE CARE OF THINGS AT HOME, OR GET ALONG WITH OTHER PEOPLE: EXTREMELY DIFFICULT
GAD7 TOTAL SCORE: 8
3. WORRYING TOO MUCH ABOUT DIFFERENT THINGS: SEVERAL DAYS
2. NOT BEING ABLE TO STOP OR CONTROL WORRYING: SEVERAL DAYS
5. BEING SO RESTLESS THAT IT IS HARD TO SIT STILL: SEVERAL DAYS
3. WORRYING TOO MUCH ABOUT DIFFERENT THINGS: SEVERAL DAYS
4. TROUBLE RELAXING: SEVERAL DAYS
7. FEELING AFRAID AS IF SOMETHING AWFUL MIGHT HAPPEN: NOT AT ALL
4. TROUBLE RELAXING: SEVERAL DAYS
1. FEELING NERVOUS, ANXIOUS, OR ON EDGE: MORE THAN HALF THE DAYS

## 2025-07-30 ENCOUNTER — TELEMEDICINE (OUTPATIENT)
Dept: PSYCHIATRY | Age: 35
End: 2025-07-30
Payer: MEDICAID

## 2025-07-30 DIAGNOSIS — F60.3 BORDERLINE PERSONALITY DISORDER (HCC): Primary | ICD-10-CM

## 2025-07-30 DIAGNOSIS — G47.00 INSOMNIA, PERSISTENT: ICD-10-CM

## 2025-07-30 DIAGNOSIS — F41.1 GAD (GENERALIZED ANXIETY DISORDER): ICD-10-CM

## 2025-07-30 DIAGNOSIS — F43.10 PTSD (POST-TRAUMATIC STRESS DISORDER): ICD-10-CM

## 2025-07-30 DIAGNOSIS — F39 UNSPECIFIED MOOD (AFFECTIVE) DISORDER: ICD-10-CM

## 2025-07-30 PROCEDURE — 99214 OFFICE O/P EST MOD 30 MIN: CPT | Performed by: PSYCHIATRY & NEUROLOGY

## 2025-07-30 PROCEDURE — 90833 PSYTX W PT W E/M 30 MIN: CPT | Performed by: PSYCHIATRY & NEUROLOGY

## 2025-07-30 PROCEDURE — G8427 DOCREV CUR MEDS BY ELIG CLIN: HCPCS | Performed by: PSYCHIATRY & NEUROLOGY

## 2025-07-30 RX ORDER — ARIPIPRAZOLE 2 MG/1
2 TABLET ORAL NIGHTLY
Qty: 30 TABLET | Refills: 2 | Status: SHIPPED | OUTPATIENT
Start: 2025-07-30

## 2025-07-30 RX ORDER — ARIPIPRAZOLE 2 MG/1
2 TABLET ORAL NIGHTLY
COMMUNITY
Start: 2025-07-18 | End: 2025-07-30 | Stop reason: SDUPTHER

## 2025-07-30 NOTE — PROGRESS NOTES
[facility] [place] [facility]  [facility]  [address] [address]  [phone]    Progress Note    Patient:  Sabina Bocanegra  YOB: 1990  PCP:  Jennyfer Butcher, APRN - CNP  Visit Date:  7/30/2025    Sabina Bocanegra, was evaluated through a synchronous (real-time) audio-video encounter. The patient (or guardian if applicable) is aware that this is a billable service, which includes applicable co-pays. This Virtual Visit was conducted with patient's (and/or legal guardian's) consent. Patient identification was verified, and a caregiver was present when appropriate.   The patient was located at Home: [address]  [place] [address]  Provider was located at Home (Appt Dept State): [place]  Confirm you are appropriately licensed, registered, or certified to deliver care in the state where the patient is located as indicated above. If you are not or unsure, please re-schedule the visit: Yes, I confirm.        Total time spent for this encounter: Not billed by time    --Miguel Cespedes MD on 7/30/2025 at 12:52 PM    An electronic signature was used to authenticate this note.        Reason for Visit/CC:  Follow up for medication management for    Diagnosis Orders   1. Borderline personality disorder (HCC)        2. Unspecified mood (affective) disorder        3. PTSD (post-traumatic stress disorder)        4. MARGO (generalized anxiety disorder)        5. Insomnia, persistent              Assessment & Plan    Clinical Impression:  The patient continues to endorse depressive symptoms since her hospitalization but has shown improvement with the addition of Abilify 2 mg daily. She reports better emotional regulation and mood but still struggles with brain fog and sleep disturbances. There is a borderline personality component and underlying trauma contributing to her mental health challenges. The patient has not reported suicidal ideation but

## 2025-07-30 NOTE — PATIENT INSTRUCTIONS
Plan:  Sabina Bocanegra, it was nice seeing you today  Continue current medications  Continue with therapy.  Follow up in 4 weeks      -Please take medications as prescribed  -Refrain from alcohol or drug use  -Seek emergency help via the emergency and/or calling 911 should symptoms become severe, worsen, or with other concerning symptoms.Go immediately to the emergency room and/or call 911 with any suicidal or homicidal ideations or if audio/visual hallucinations develop.   -Contact office with any questions or concerns.      Crisis phone numbers:  988-national suicide hotline, call or text  [place] [place] [phone].  [facility] [phone]  [facility] [phone].  [facility] [phone].  [facility] [phone].  [facility] [phone].

## 2025-08-06 DIAGNOSIS — F41.1 GAD (GENERALIZED ANXIETY DISORDER): ICD-10-CM

## 2025-08-07 RX ORDER — FLUOXETINE HYDROCHLORIDE 40 MG/1
40 CAPSULE ORAL DAILY
Qty: 90 CAPSULE | Refills: 1 | Status: SHIPPED | OUTPATIENT
Start: 2025-08-07

## 2025-08-13 ENCOUNTER — RESULTS FOLLOW-UP (OUTPATIENT)
Dept: FAMILY MEDICINE CLINIC | Age: 35
End: 2025-08-13

## 2025-08-15 DIAGNOSIS — G43.E09 CHRONIC MIGRAINE WITH AURA WITHOUT STATUS MIGRAINOSUS, NOT INTRACTABLE: ICD-10-CM

## 2025-08-15 RX ORDER — RIZATRIPTAN BENZOATE 10 MG/1
10 TABLET ORAL
Qty: 9 TABLET | Refills: 3 | Status: SHIPPED | OUTPATIENT
Start: 2025-08-15

## 2025-08-22 ASSESSMENT — PATIENT HEALTH QUESTIONNAIRE - PHQ9
5. POOR APPETITE OR OVEREATING: MORE THAN HALF THE DAYS
SUM OF ALL RESPONSES TO PHQ QUESTIONS 1-9: 9
7. TROUBLE CONCENTRATING ON THINGS, SUCH AS READING THE NEWSPAPER OR WATCHING TELEVISION: NOT AT ALL
SUM OF ALL RESPONSES TO PHQ QUESTIONS 1-9: 9
5. POOR APPETITE OR OVEREATING: MORE THAN HALF THE DAYS
SUM OF ALL RESPONSES TO PHQ QUESTIONS 1-9: 9
9. THOUGHTS THAT YOU WOULD BE BETTER OFF DEAD, OR OF HURTING YOURSELF: NOT AT ALL
2. FEELING DOWN, DEPRESSED OR HOPELESS: SEVERAL DAYS
SUM OF ALL RESPONSES TO PHQ QUESTIONS 1-9: 9
6. FEELING BAD ABOUT YOURSELF - OR THAT YOU ARE A FAILURE OR HAVE LET YOURSELF OR YOUR FAMILY DOWN: NOT AT ALL
1. LITTLE INTEREST OR PLEASURE IN DOING THINGS: NOT AT ALL
2. FEELING DOWN, DEPRESSED OR HOPELESS: SEVERAL DAYS
4. FEELING TIRED OR HAVING LITTLE ENERGY: NEARLY EVERY DAY
8. MOVING OR SPEAKING SO SLOWLY THAT OTHER PEOPLE COULD HAVE NOTICED. OR THE OPPOSITE - BEING SO FIDGETY OR RESTLESS THAT YOU HAVE BEEN MOVING AROUND A LOT MORE THAN USUAL: NOT AT ALL
4. FEELING TIRED OR HAVING LITTLE ENERGY: NEARLY EVERY DAY
9. THOUGHTS THAT YOU WOULD BE BETTER OFF DEAD, OR OF HURTING YOURSELF: NOT AT ALL
3. TROUBLE FALLING OR STAYING ASLEEP: NEARLY EVERY DAY
6. FEELING BAD ABOUT YOURSELF - OR THAT YOU ARE A FAILURE OR HAVE LET YOURSELF OR YOUR FAMILY DOWN: NOT AT ALL
1. LITTLE INTEREST OR PLEASURE IN DOING THINGS: NOT AT ALL
7. TROUBLE CONCENTRATING ON THINGS, SUCH AS READING THE NEWSPAPER OR WATCHING TELEVISION: NOT AT ALL
SUM OF ALL RESPONSES TO PHQ QUESTIONS 1-9: 9
8. MOVING OR SPEAKING SO SLOWLY THAT OTHER PEOPLE COULD HAVE NOTICED. OR THE OPPOSITE, BEING SO FIGETY OR RESTLESS THAT YOU HAVE BEEN MOVING AROUND A LOT MORE THAN USUAL: NOT AT ALL
10. IF YOU CHECKED OFF ANY PROBLEMS, HOW DIFFICULT HAVE THESE PROBLEMS MADE IT FOR YOU TO DO YOUR WORK, TAKE CARE OF THINGS AT HOME, OR GET ALONG WITH OTHER PEOPLE: SOMEWHAT DIFFICULT
3. TROUBLE FALLING OR STAYING ASLEEP: NEARLY EVERY DAY
10. IF YOU CHECKED OFF ANY PROBLEMS, HOW DIFFICULT HAVE THESE PROBLEMS MADE IT FOR YOU TO DO YOUR WORK, TAKE CARE OF THINGS AT HOME, OR GET ALONG WITH OTHER PEOPLE: SOMEWHAT DIFFICULT

## 2025-08-22 ASSESSMENT — ANXIETY QUESTIONNAIRES
2. NOT BEING ABLE TO STOP OR CONTROL WORRYING: NOT AT ALL
7. FEELING AFRAID AS IF SOMETHING AWFUL MIGHT HAPPEN: NOT AT ALL
2. NOT BEING ABLE TO STOP OR CONTROL WORRYING: NOT AT ALL
5. BEING SO RESTLESS THAT IT IS HARD TO SIT STILL: NOT AT ALL
3. WORRYING TOO MUCH ABOUT DIFFERENT THINGS: NOT AT ALL
IF YOU CHECKED OFF ANY PROBLEMS ON THIS QUESTIONNAIRE, HOW DIFFICULT HAVE THESE PROBLEMS MADE IT FOR YOU TO DO YOUR WORK, TAKE CARE OF THINGS AT HOME, OR GET ALONG WITH OTHER PEOPLE: SOMEWHAT DIFFICULT
GAD7 TOTAL SCORE: 5
1. FEELING NERVOUS, ANXIOUS, OR ON EDGE: NEARLY EVERY DAY
3. WORRYING TOO MUCH ABOUT DIFFERENT THINGS: NOT AT ALL
IF YOU CHECKED OFF ANY PROBLEMS ON THIS QUESTIONNAIRE, HOW DIFFICULT HAVE THESE PROBLEMS MADE IT FOR YOU TO DO YOUR WORK, TAKE CARE OF THINGS AT HOME, OR GET ALONG WITH OTHER PEOPLE: SOMEWHAT DIFFICULT
1. FEELING NERVOUS, ANXIOUS, OR ON EDGE: NEARLY EVERY DAY
6. BECOMING EASILY ANNOYED OR IRRITABLE: SEVERAL DAYS
4. TROUBLE RELAXING: SEVERAL DAYS
6. BECOMING EASILY ANNOYED OR IRRITABLE: SEVERAL DAYS
5. BEING SO RESTLESS THAT IT IS HARD TO SIT STILL: NOT AT ALL
7. FEELING AFRAID AS IF SOMETHING AWFUL MIGHT HAPPEN: NOT AT ALL
4. TROUBLE RELAXING: SEVERAL DAYS

## 2025-08-25 ENCOUNTER — TELEMEDICINE (OUTPATIENT)
Dept: PSYCHIATRY | Age: 35
End: 2025-08-25
Payer: MEDICAID

## 2025-08-25 DIAGNOSIS — F39 UNSPECIFIED MOOD (AFFECTIVE) DISORDER: Primary | ICD-10-CM

## 2025-08-25 DIAGNOSIS — F60.3 BORDERLINE PERSONALITY DISORDER (HCC): ICD-10-CM

## 2025-08-25 DIAGNOSIS — F43.12 CHRONIC POST-TRAUMATIC STRESS DISORDER (PTSD): ICD-10-CM

## 2025-08-25 DIAGNOSIS — G47.00 INSOMNIA, PERSISTENT: ICD-10-CM

## 2025-08-25 DIAGNOSIS — F41.1 GAD (GENERALIZED ANXIETY DISORDER): ICD-10-CM

## 2025-08-25 PROCEDURE — 99214 OFFICE O/P EST MOD 30 MIN: CPT | Performed by: PSYCHIATRY & NEUROLOGY

## 2025-08-25 PROCEDURE — 90833 PSYTX W PT W E/M 30 MIN: CPT | Performed by: PSYCHIATRY & NEUROLOGY

## 2025-08-25 PROCEDURE — G8427 DOCREV CUR MEDS BY ELIG CLIN: HCPCS | Performed by: PSYCHIATRY & NEUROLOGY

## 2025-08-25 RX ORDER — ARIPIPRAZOLE 5 MG/1
5 TABLET ORAL NIGHTLY
Qty: 30 TABLET | Refills: 2 | Status: SHIPPED | OUTPATIENT
Start: 2025-08-25

## 2025-08-30 ENCOUNTER — PATIENT MESSAGE (OUTPATIENT)
Dept: FAMILY MEDICINE CLINIC | Age: 35
End: 2025-08-30

## 2025-09-02 ENCOUNTER — TELEPHONE (OUTPATIENT)
Dept: PSYCHIATRY | Age: 35
End: 2025-09-02

## 2025-09-03 ENCOUNTER — OFFICE VISIT (OUTPATIENT)
Dept: FAMILY MEDICINE CLINIC | Age: 35
End: 2025-09-03
Payer: MEDICAID

## 2025-09-03 VITALS
TEMPERATURE: 98.2 F | WEIGHT: 183 LBS | RESPIRATION RATE: 16 BRPM | BODY MASS INDEX: 29.55 KG/M2 | SYSTOLIC BLOOD PRESSURE: 116 MMHG | HEART RATE: 97 BPM | DIASTOLIC BLOOD PRESSURE: 70 MMHG

## 2025-09-03 DIAGNOSIS — M62.838 MUSCLE SPASM: ICD-10-CM

## 2025-09-03 DIAGNOSIS — R53.82 CHRONIC FATIGUE: ICD-10-CM

## 2025-09-03 DIAGNOSIS — F48.8 PSYCHOGENIC SYNCOPE: Primary | ICD-10-CM

## 2025-09-03 DIAGNOSIS — F41.1 GAD (GENERALIZED ANXIETY DISORDER): ICD-10-CM

## 2025-09-03 DIAGNOSIS — M24.80 GENERALIZED HYPERMOBILITY OF JOINTS: ICD-10-CM

## 2025-09-03 DIAGNOSIS — R42 DIZZINESS: ICD-10-CM

## 2025-09-03 DIAGNOSIS — K21.9 GASTROESOPHAGEAL REFLUX DISEASE, UNSPECIFIED WHETHER ESOPHAGITIS PRESENT: ICD-10-CM

## 2025-09-03 PROCEDURE — G8427 DOCREV CUR MEDS BY ELIG CLIN: HCPCS | Performed by: NURSE PRACTITIONER

## 2025-09-03 PROCEDURE — 1036F TOBACCO NON-USER: CPT | Performed by: NURSE PRACTITIONER

## 2025-09-03 PROCEDURE — G8417 CALC BMI ABV UP PARAM F/U: HCPCS | Performed by: NURSE PRACTITIONER

## 2025-09-03 PROCEDURE — 99214 OFFICE O/P EST MOD 30 MIN: CPT | Performed by: NURSE PRACTITIONER

## 2025-09-03 ASSESSMENT — ENCOUNTER SYMPTOMS
DIARRHEA: 0
VOMITING: 0
NAUSEA: 1
CONSTIPATION: 0
BLOOD IN STOOL: 0
SHORTNESS OF BREATH: 0